# Patient Record
Sex: FEMALE | Race: WHITE
[De-identification: names, ages, dates, MRNs, and addresses within clinical notes are randomized per-mention and may not be internally consistent; named-entity substitution may affect disease eponyms.]

---

## 2021-08-24 ENCOUNTER — RX ONLY (RX ONLY)
Age: 81
End: 2021-08-24

## 2021-08-31 ENCOUNTER — DOCTOR'S OFFICE (OUTPATIENT)
Dept: URBAN - METROPOLITAN AREA CLINIC 163 | Facility: CLINIC | Age: 81
Setting detail: OPHTHALMOLOGY
End: 2021-08-31
Payer: MEDICARE

## 2021-08-31 PROCEDURE — 92250 FUNDUS PHOTOGRAPHY W/I&R: CPT | Performed by: OPTOMETRIST

## 2021-08-31 PROCEDURE — 92014 COMPRE OPH EXAM EST PT 1/>: CPT | Performed by: OPTOMETRIST

## 2021-08-31 ASSESSMENT — SPHEQUIV_DERIVED
OS_SPHEQUIV: -0.25
OD_SPHEQUIV: -0.125

## 2021-08-31 ASSESSMENT — REFRACTION_AUTOREFRACTION
OS_AXIS: 027
OS_CYLINDER: +0.50
OD_AXIS: 041
OS_SPHERE: -0.50
OD_SPHERE: -0.50
OD_CYLINDER: +0.75

## 2021-08-31 ASSESSMENT — CONFRONTATIONAL VISUAL FIELD TEST (CVF)
OD_FINDINGS: FULL
OS_FINDINGS: FULL

## 2021-08-31 ASSESSMENT — VISUAL ACUITY
OS_BCVA: 20/40-2
OD_BCVA: 20/30-1

## 2021-08-31 ASSESSMENT — SUPERFICIAL PUNCTATE KERATITIS (SPK)
OS_SPK: 1+
OD_SPK: 1+

## 2021-08-31 ASSESSMENT — TONOMETRY
OD_IOP_MMHG: 15
OS_IOP_MMHG: 20

## 2022-03-10 ENCOUNTER — DOCTOR'S OFFICE (OUTPATIENT)
Dept: URBAN - METROPOLITAN AREA CLINIC 163 | Facility: CLINIC | Age: 82
Setting detail: OPHTHALMOLOGY
End: 2022-03-10
Payer: COMMERCIAL

## 2022-03-10 ENCOUNTER — RX ONLY (RX ONLY)
Age: 82
End: 2022-03-10

## 2022-03-10 PROCEDURE — 92250 FUNDUS PHOTOGRAPHY W/I&R: CPT | Performed by: OPHTHALMOLOGY

## 2022-03-10 PROCEDURE — 92014 COMPRE OPH EXAM EST PT 1/>: CPT | Performed by: OPHTHALMOLOGY

## 2022-03-10 ASSESSMENT — REFRACTION_AUTOREFRACTION
OD_CYLINDER: +0.75
OD_SPHERE: -0.50
OS_AXIS: 027
OS_CYLINDER: +0.50
OD_AXIS: 041
OS_SPHERE: -0.50

## 2022-03-10 ASSESSMENT — SUPERFICIAL PUNCTATE KERATITIS (SPK)
OD_SPK: 1+
OS_SPK: 1+

## 2022-03-10 ASSESSMENT — SPHEQUIV_DERIVED
OS_SPHEQUIV: -0.25
OD_SPHEQUIV: -0.125

## 2022-03-10 ASSESSMENT — CONFRONTATIONAL VISUAL FIELD TEST (CVF)
OD_FINDINGS: FULL
OS_FINDINGS: FULL

## 2022-03-10 ASSESSMENT — VISUAL ACUITY
OD_BCVA: 20/30-2
OS_BCVA: 20/50

## 2022-09-22 ENCOUNTER — DOCTOR'S OFFICE (OUTPATIENT)
Dept: URBAN - METROPOLITAN AREA CLINIC 163 | Facility: CLINIC | Age: 82
Setting detail: OPHTHALMOLOGY
End: 2022-09-22
Payer: COMMERCIAL

## 2022-09-22 PROCEDURE — 92012 INTRM OPH EXAM EST PATIENT: CPT | Performed by: OPHTHALMOLOGY

## 2022-09-22 PROCEDURE — 92134 CPTRZ OPH DX IMG PST SGM RTA: CPT | Performed by: OPHTHALMOLOGY

## 2022-09-22 ASSESSMENT — REFRACTION_AUTOREFRACTION
OD_CYLINDER: +0.75
OS_AXIS: 027
OS_SPHERE: -0.50
OD_AXIS: 041
OD_SPHERE: -0.50
OS_CYLINDER: +0.50

## 2022-09-22 ASSESSMENT — SPHEQUIV_DERIVED
OD_SPHEQUIV: -0.125
OS_SPHEQUIV: -0.25

## 2022-09-22 ASSESSMENT — TONOMETRY
OS_IOP_MMHG: 16
OD_IOP_MMHG: 12

## 2022-09-22 ASSESSMENT — SUPERFICIAL PUNCTATE KERATITIS (SPK)
OD_SPK: 1+
OS_SPK: 1+

## 2022-09-22 ASSESSMENT — VISUAL ACUITY
OS_BCVA: 20/40
OD_BCVA: 20/40

## 2022-09-22 ASSESSMENT — CONFRONTATIONAL VISUAL FIELD TEST (CVF)
OD_FINDINGS: FULL
OS_FINDINGS: FULL

## 2022-11-09 ENCOUNTER — DOCTOR'S OFFICE (OUTPATIENT)
Dept: URBAN - METROPOLITAN AREA CLINIC 163 | Facility: CLINIC | Age: 82
Setting detail: OPHTHALMOLOGY
End: 2022-11-09
Payer: COMMERCIAL

## 2022-11-09 DIAGNOSIS — H35.3212: ICD-10-CM

## 2022-11-09 DIAGNOSIS — H35.3122: ICD-10-CM

## 2022-11-09 DIAGNOSIS — H04.123: ICD-10-CM

## 2022-11-09 DIAGNOSIS — H43.811: ICD-10-CM

## 2022-11-09 PROCEDURE — 92014 COMPRE OPH EXAM EST PT 1/>: CPT | Performed by: OPTOMETRIST

## 2022-11-09 PROCEDURE — 92201 OPSCPY EXTND RTA DRAW UNI/BI: CPT | Performed by: OPTOMETRIST

## 2022-11-09 ASSESSMENT — VISUAL ACUITY
OS_BCVA: 20/40-2
OD_BCVA: 20/40

## 2022-11-09 ASSESSMENT — SPHEQUIV_DERIVED
OD_SPHEQUIV: -0.125
OS_SPHEQUIV: -0.25

## 2022-11-09 ASSESSMENT — CONFRONTATIONAL VISUAL FIELD TEST (CVF)
OS_FINDINGS: FULL
OD_FINDINGS: FULL

## 2022-11-09 ASSESSMENT — REFRACTION_AUTOREFRACTION
OD_AXIS: 041
OS_SPHERE: -0.50
OD_CYLINDER: +0.75
OS_AXIS: 027
OS_CYLINDER: +0.50
OD_SPHERE: -0.50

## 2022-11-09 ASSESSMENT — SUPERFICIAL PUNCTATE KERATITIS (SPK)
OD_SPK: 1+
OS_SPK: 1+

## 2022-12-21 ENCOUNTER — DOCTOR'S OFFICE (OUTPATIENT)
Dept: URBAN - METROPOLITAN AREA CLINIC 163 | Facility: CLINIC | Age: 82
Setting detail: OPHTHALMOLOGY
End: 2022-12-21
Payer: COMMERCIAL

## 2022-12-21 DIAGNOSIS — H35.3212: ICD-10-CM

## 2022-12-21 DIAGNOSIS — H43.811: ICD-10-CM

## 2022-12-21 DIAGNOSIS — H35.3122: ICD-10-CM

## 2022-12-21 DIAGNOSIS — H04.123: ICD-10-CM

## 2022-12-21 PROCEDURE — 92012 INTRM OPH EXAM EST PATIENT: CPT | Performed by: OPTOMETRIST

## 2022-12-21 ASSESSMENT — REFRACTION_AUTOREFRACTION
OD_SPHERE: -0.50
OS_AXIS: 017
OD_CYLINDER: +1.00
OS_SPHERE: -0.50
OS_CYLINDER: +0.25
OD_AXIS: 035

## 2022-12-21 ASSESSMENT — SPHEQUIV_DERIVED
OS_SPHEQUIV: -0.375
OD_SPHEQUIV: 0

## 2022-12-21 ASSESSMENT — CONFRONTATIONAL VISUAL FIELD TEST (CVF)
OD_FINDINGS: FULL
OS_FINDINGS: FULL

## 2022-12-21 ASSESSMENT — SUPERFICIAL PUNCTATE KERATITIS (SPK)
OS_SPK: 1+
OD_SPK: 1+

## 2022-12-21 ASSESSMENT — VISUAL ACUITY
OD_BCVA: 20/40-2
OS_BCVA: 20/40-

## 2023-04-19 ENCOUNTER — DOCTOR'S OFFICE (OUTPATIENT)
Dept: URBAN - METROPOLITAN AREA CLINIC 163 | Facility: CLINIC | Age: 83
Setting detail: OPHTHALMOLOGY
End: 2023-04-19
Payer: COMMERCIAL

## 2023-04-19 DIAGNOSIS — H35.3212: ICD-10-CM

## 2023-04-19 DIAGNOSIS — H43.811: ICD-10-CM

## 2023-04-19 DIAGNOSIS — H04.123: ICD-10-CM

## 2023-04-19 DIAGNOSIS — H35.3122: ICD-10-CM

## 2023-04-19 PROCEDURE — 92014 COMPRE OPH EXAM EST PT 1/>: CPT | Performed by: OPTOMETRIST

## 2023-04-19 PROCEDURE — 92250 FUNDUS PHOTOGRAPHY W/I&R: CPT | Performed by: OPTOMETRIST

## 2023-04-19 ASSESSMENT — REFRACTION_CURRENTRX
OS_OVR_VA: 20/
OD_ADD: +2.50
OD_VPRISM_DIRECTION: SV
OS_VPRISM_DIRECTION: SV
OS_ADD: +2.50
OD_OVR_VA: 20/

## 2023-04-19 ASSESSMENT — CONFRONTATIONAL VISUAL FIELD TEST (CVF)
OD_FINDINGS: FULL
OS_FINDINGS: FULL

## 2023-04-19 ASSESSMENT — REFRACTION_AUTOREFRACTION
OS_AXIS: 017
OD_SPHERE: -0.50
OD_AXIS: 035
OS_CYLINDER: +0.25
OS_SPHERE: -0.50
OD_CYLINDER: +1.00

## 2023-04-19 ASSESSMENT — VISUAL ACUITY
OD_BCVA: 20/40-
OS_BCVA: 20/30-

## 2023-04-19 ASSESSMENT — SPHEQUIV_DERIVED
OS_SPHEQUIV: -0.375
OD_SPHEQUIV: 0

## 2023-04-19 ASSESSMENT — SUPERFICIAL PUNCTATE KERATITIS (SPK)
OD_SPK: 1+
OS_SPK: 1+

## 2023-05-15 ENCOUNTER — RX ONLY (RX ONLY)
Age: 83
End: 2023-05-15

## 2023-05-15 ENCOUNTER — DOCTOR'S OFFICE (OUTPATIENT)
Dept: URBAN - METROPOLITAN AREA CLINIC 163 | Facility: CLINIC | Age: 83
Setting detail: OPHTHALMOLOGY
End: 2023-05-15
Payer: COMMERCIAL

## 2023-05-15 DIAGNOSIS — H35.3212: ICD-10-CM

## 2023-05-15 DIAGNOSIS — H43.811: ICD-10-CM

## 2023-05-15 DIAGNOSIS — H00.12: ICD-10-CM

## 2023-05-15 DIAGNOSIS — H04.123: ICD-10-CM

## 2023-05-15 DIAGNOSIS — H35.3122: ICD-10-CM

## 2023-05-15 PROCEDURE — 11900 INJECT SKIN LESIONS </W 7: CPT | Performed by: OPHTHALMOLOGY

## 2023-05-15 PROCEDURE — 92012 INTRM OPH EXAM EST PATIENT: CPT | Performed by: OPHTHALMOLOGY

## 2023-05-15 ASSESSMENT — CONFRONTATIONAL VISUAL FIELD TEST (CVF)
OS_FINDINGS: FULL
OD_FINDINGS: FULL

## 2023-05-15 ASSESSMENT — REFRACTION_CURRENTRX
OS_ADD: +2.50
OS_VPRISM_DIRECTION: SV
OD_ADD: +2.50
OD_VPRISM_DIRECTION: SV
OS_OVR_VA: 20/
OD_OVR_VA: 20/

## 2023-05-15 ASSESSMENT — SUPERFICIAL PUNCTATE KERATITIS (SPK)
OD_SPK: 1+
OS_SPK: 1+

## 2023-05-15 ASSESSMENT — REFRACTION_AUTOREFRACTION
OD_SPHERE: -0.50
OD_AXIS: 035
OS_AXIS: 017
OD_CYLINDER: +1.00
OS_SPHERE: -0.50
OS_CYLINDER: +0.25

## 2023-05-15 ASSESSMENT — SPHEQUIV_DERIVED
OD_SPHEQUIV: 0
OS_SPHEQUIV: -0.375

## 2023-05-15 ASSESSMENT — VISUAL ACUITY
OD_BCVA: 20/30-2
OS_BCVA: 20/30-2

## 2023-05-24 ENCOUNTER — RX ONLY (RX ONLY)
Age: 83
End: 2023-05-24

## 2023-05-24 ENCOUNTER — DOCTOR'S OFFICE (OUTPATIENT)
Dept: URBAN - METROPOLITAN AREA CLINIC 163 | Facility: CLINIC | Age: 83
Setting detail: OPHTHALMOLOGY
End: 2023-05-24
Payer: COMMERCIAL

## 2023-05-24 DIAGNOSIS — H00.12: ICD-10-CM

## 2023-05-24 DIAGNOSIS — H35.3122: ICD-10-CM

## 2023-05-24 DIAGNOSIS — H43.811: ICD-10-CM

## 2023-05-24 DIAGNOSIS — H04.123: ICD-10-CM

## 2023-05-24 DIAGNOSIS — H01.004: ICD-10-CM

## 2023-05-24 DIAGNOSIS — H01.001: ICD-10-CM

## 2023-05-24 DIAGNOSIS — H35.3212: ICD-10-CM

## 2023-05-24 PROCEDURE — 92012 INTRM OPH EXAM EST PATIENT: CPT | Performed by: OPTOMETRIST

## 2023-05-24 ASSESSMENT — SUPERFICIAL PUNCTATE KERATITIS (SPK)
OD_SPK: 1+
OS_SPK: 1+

## 2023-05-24 ASSESSMENT — VISUAL ACUITY
OS_BCVA: 20/25-2
OD_BCVA: 20/25-

## 2023-05-24 ASSESSMENT — REFRACTION_CURRENTRX
OD_OVR_VA: 20/
OS_OVR_VA: 20/
OS_VPRISM_DIRECTION: SV
OD_VPRISM_DIRECTION: SV
OD_ADD: +2.50
OS_ADD: +2.50

## 2023-05-24 ASSESSMENT — REFRACTION_AUTOREFRACTION
OS_CYLINDER: +0.25
OD_SPHERE: -0.50
OS_SPHERE: -0.50
OD_AXIS: 035
OS_AXIS: 017
OD_CYLINDER: +1.00

## 2023-05-24 ASSESSMENT — CONFRONTATIONAL VISUAL FIELD TEST (CVF)
OS_FINDINGS: FULL
OD_FINDINGS: FULL

## 2023-05-24 ASSESSMENT — LID EXAM ASSESSMENTS
OS_BLEPHARITIS: LUL T
OD_BLEPHARITIS: RUL T

## 2023-05-24 ASSESSMENT — SPHEQUIV_DERIVED
OS_SPHEQUIV: -0.375
OD_SPHEQUIV: 0

## 2023-12-06 ENCOUNTER — INPATIENT (INPATIENT)
Facility: HOSPITAL | Age: 83
LOS: 0 days | Discharge: HOME | DRG: 176 | End: 2023-12-07
Attending: INTERNAL MEDICINE | Admitting: INTERNAL MEDICINE
Payer: MEDICARE

## 2023-12-06 VITALS
HEART RATE: 72 BPM | RESPIRATION RATE: 18 BRPM | HEIGHT: 62 IN | OXYGEN SATURATION: 95 % | TEMPERATURE: 97 F | WEIGHT: 149.91 LBS | SYSTOLIC BLOOD PRESSURE: 155 MMHG | DIASTOLIC BLOOD PRESSURE: 80 MMHG

## 2023-12-06 DIAGNOSIS — R07.9 CHEST PAIN, UNSPECIFIED: ICD-10-CM

## 2023-12-06 LAB
ALBUMIN SERPL ELPH-MCNC: 3.8 G/DL — SIGNIFICANT CHANGE UP (ref 3.3–5)
ALBUMIN SERPL ELPH-MCNC: 3.8 G/DL — SIGNIFICANT CHANGE UP (ref 3.3–5)
ALP SERPL-CCNC: 73 U/L — SIGNIFICANT CHANGE UP (ref 40–120)
ALP SERPL-CCNC: 73 U/L — SIGNIFICANT CHANGE UP (ref 40–120)
ALT FLD-CCNC: 39 U/L — SIGNIFICANT CHANGE UP (ref 10–45)
ALT FLD-CCNC: 39 U/L — SIGNIFICANT CHANGE UP (ref 10–45)
ANION GAP SERPL CALC-SCNC: 10 MMOL/L — SIGNIFICANT CHANGE UP (ref 5–17)
ANION GAP SERPL CALC-SCNC: 10 MMOL/L — SIGNIFICANT CHANGE UP (ref 5–17)
AST SERPL-CCNC: 32 U/L — SIGNIFICANT CHANGE UP (ref 10–40)
AST SERPL-CCNC: 32 U/L — SIGNIFICANT CHANGE UP (ref 10–40)
BASOPHILS # BLD AUTO: 0.07 K/UL — SIGNIFICANT CHANGE UP (ref 0–0.2)
BASOPHILS # BLD AUTO: 0.07 K/UL — SIGNIFICANT CHANGE UP (ref 0–0.2)
BASOPHILS NFR BLD AUTO: 1.1 % — SIGNIFICANT CHANGE UP (ref 0–2)
BASOPHILS NFR BLD AUTO: 1.1 % — SIGNIFICANT CHANGE UP (ref 0–2)
BILIRUB SERPL-MCNC: 0.4 MG/DL — SIGNIFICANT CHANGE UP (ref 0.2–1.2)
BILIRUB SERPL-MCNC: 0.4 MG/DL — SIGNIFICANT CHANGE UP (ref 0.2–1.2)
BUN SERPL-MCNC: 10 MG/DL — SIGNIFICANT CHANGE UP (ref 7–23)
BUN SERPL-MCNC: 10 MG/DL — SIGNIFICANT CHANGE UP (ref 7–23)
CALCIUM SERPL-MCNC: 9 MG/DL — SIGNIFICANT CHANGE UP (ref 8.4–10.5)
CALCIUM SERPL-MCNC: 9 MG/DL — SIGNIFICANT CHANGE UP (ref 8.4–10.5)
CHLORIDE SERPL-SCNC: 106 MMOL/L — SIGNIFICANT CHANGE UP (ref 96–108)
CHLORIDE SERPL-SCNC: 106 MMOL/L — SIGNIFICANT CHANGE UP (ref 96–108)
CO2 SERPL-SCNC: 27 MMOL/L — SIGNIFICANT CHANGE UP (ref 22–31)
CO2 SERPL-SCNC: 27 MMOL/L — SIGNIFICANT CHANGE UP (ref 22–31)
CREAT SERPL-MCNC: 0.63 MG/DL — SIGNIFICANT CHANGE UP (ref 0.5–1.3)
CREAT SERPL-MCNC: 0.63 MG/DL — SIGNIFICANT CHANGE UP (ref 0.5–1.3)
D DIMER BLD IA.RAPID-MCNC: 737 NG/ML DDU — HIGH
D DIMER BLD IA.RAPID-MCNC: 737 NG/ML DDU — HIGH
EGFR: 88 ML/MIN/1.73M2 — SIGNIFICANT CHANGE UP
EGFR: 88 ML/MIN/1.73M2 — SIGNIFICANT CHANGE UP
EOSINOPHIL # BLD AUTO: 0.17 K/UL — SIGNIFICANT CHANGE UP (ref 0–0.5)
EOSINOPHIL # BLD AUTO: 0.17 K/UL — SIGNIFICANT CHANGE UP (ref 0–0.5)
EOSINOPHIL NFR BLD AUTO: 2.7 % — SIGNIFICANT CHANGE UP (ref 0–6)
EOSINOPHIL NFR BLD AUTO: 2.7 % — SIGNIFICANT CHANGE UP (ref 0–6)
GLUCOSE SERPL-MCNC: 116 MG/DL — HIGH (ref 70–99)
GLUCOSE SERPL-MCNC: 116 MG/DL — HIGH (ref 70–99)
HCT VFR BLD CALC: 37.5 % — SIGNIFICANT CHANGE UP (ref 34.5–45)
HCT VFR BLD CALC: 37.5 % — SIGNIFICANT CHANGE UP (ref 34.5–45)
HGB BLD-MCNC: 12.8 G/DL — SIGNIFICANT CHANGE UP (ref 11.5–15.5)
HGB BLD-MCNC: 12.8 G/DL — SIGNIFICANT CHANGE UP (ref 11.5–15.5)
IMM GRANULOCYTES NFR BLD AUTO: 0.3 % — SIGNIFICANT CHANGE UP (ref 0–0.9)
IMM GRANULOCYTES NFR BLD AUTO: 0.3 % — SIGNIFICANT CHANGE UP (ref 0–0.9)
LIDOCAIN IGE QN: 48 U/L — SIGNIFICANT CHANGE UP (ref 16–77)
LIDOCAIN IGE QN: 48 U/L — SIGNIFICANT CHANGE UP (ref 16–77)
LYMPHOCYTES # BLD AUTO: 1.61 K/UL — SIGNIFICANT CHANGE UP (ref 1–3.3)
LYMPHOCYTES # BLD AUTO: 1.61 K/UL — SIGNIFICANT CHANGE UP (ref 1–3.3)
LYMPHOCYTES # BLD AUTO: 25.4 % — SIGNIFICANT CHANGE UP (ref 13–44)
LYMPHOCYTES # BLD AUTO: 25.4 % — SIGNIFICANT CHANGE UP (ref 13–44)
MAGNESIUM SERPL-MCNC: 2.2 MG/DL — SIGNIFICANT CHANGE UP (ref 1.6–2.6)
MAGNESIUM SERPL-MCNC: 2.2 MG/DL — SIGNIFICANT CHANGE UP (ref 1.6–2.6)
MCHC RBC-ENTMCNC: 31.2 PG — SIGNIFICANT CHANGE UP (ref 27–34)
MCHC RBC-ENTMCNC: 31.2 PG — SIGNIFICANT CHANGE UP (ref 27–34)
MCHC RBC-ENTMCNC: 34.1 GM/DL — SIGNIFICANT CHANGE UP (ref 32–36)
MCHC RBC-ENTMCNC: 34.1 GM/DL — SIGNIFICANT CHANGE UP (ref 32–36)
MCV RBC AUTO: 91.5 FL — SIGNIFICANT CHANGE UP (ref 80–100)
MCV RBC AUTO: 91.5 FL — SIGNIFICANT CHANGE UP (ref 80–100)
MONOCYTES # BLD AUTO: 0.65 K/UL — SIGNIFICANT CHANGE UP (ref 0–0.9)
MONOCYTES # BLD AUTO: 0.65 K/UL — SIGNIFICANT CHANGE UP (ref 0–0.9)
MONOCYTES NFR BLD AUTO: 10.2 % — SIGNIFICANT CHANGE UP (ref 2–14)
MONOCYTES NFR BLD AUTO: 10.2 % — SIGNIFICANT CHANGE UP (ref 2–14)
NEUTROPHILS # BLD AUTO: 3.83 K/UL — SIGNIFICANT CHANGE UP (ref 1.8–7.4)
NEUTROPHILS # BLD AUTO: 3.83 K/UL — SIGNIFICANT CHANGE UP (ref 1.8–7.4)
NEUTROPHILS NFR BLD AUTO: 60.3 % — SIGNIFICANT CHANGE UP (ref 43–77)
NEUTROPHILS NFR BLD AUTO: 60.3 % — SIGNIFICANT CHANGE UP (ref 43–77)
NRBC # BLD: 0 /100 WBCS — SIGNIFICANT CHANGE UP (ref 0–0)
NRBC # BLD: 0 /100 WBCS — SIGNIFICANT CHANGE UP (ref 0–0)
NT-PROBNP SERPL-SCNC: 469 PG/ML — HIGH (ref 0–300)
NT-PROBNP SERPL-SCNC: 469 PG/ML — HIGH (ref 0–300)
PLATELET # BLD AUTO: 217 K/UL — SIGNIFICANT CHANGE UP (ref 150–400)
PLATELET # BLD AUTO: 217 K/UL — SIGNIFICANT CHANGE UP (ref 150–400)
POTASSIUM SERPL-MCNC: 3.9 MMOL/L — SIGNIFICANT CHANGE UP (ref 3.5–5.3)
POTASSIUM SERPL-MCNC: 3.9 MMOL/L — SIGNIFICANT CHANGE UP (ref 3.5–5.3)
POTASSIUM SERPL-SCNC: 3.9 MMOL/L — SIGNIFICANT CHANGE UP (ref 3.5–5.3)
POTASSIUM SERPL-SCNC: 3.9 MMOL/L — SIGNIFICANT CHANGE UP (ref 3.5–5.3)
PROT SERPL-MCNC: 7.2 G/DL — SIGNIFICANT CHANGE UP (ref 6–8.3)
PROT SERPL-MCNC: 7.2 G/DL — SIGNIFICANT CHANGE UP (ref 6–8.3)
RBC # BLD: 4.1 M/UL — SIGNIFICANT CHANGE UP (ref 3.8–5.2)
RBC # BLD: 4.1 M/UL — SIGNIFICANT CHANGE UP (ref 3.8–5.2)
RBC # FLD: 12.4 % — SIGNIFICANT CHANGE UP (ref 10.3–14.5)
RBC # FLD: 12.4 % — SIGNIFICANT CHANGE UP (ref 10.3–14.5)
SODIUM SERPL-SCNC: 143 MMOL/L — SIGNIFICANT CHANGE UP (ref 135–145)
SODIUM SERPL-SCNC: 143 MMOL/L — SIGNIFICANT CHANGE UP (ref 135–145)
TROPONIN I, HIGH SENSITIVITY RESULT: 5.8 NG/L — SIGNIFICANT CHANGE UP
TROPONIN I, HIGH SENSITIVITY RESULT: 5.8 NG/L — SIGNIFICANT CHANGE UP
TROPONIN I, HIGH SENSITIVITY RESULT: 6 NG/L — SIGNIFICANT CHANGE UP
TROPONIN I, HIGH SENSITIVITY RESULT: 6 NG/L — SIGNIFICANT CHANGE UP
WBC # BLD: 6.35 K/UL — SIGNIFICANT CHANGE UP (ref 3.8–10.5)
WBC # BLD: 6.35 K/UL — SIGNIFICANT CHANGE UP (ref 3.8–10.5)
WBC # FLD AUTO: 6.35 K/UL — SIGNIFICANT CHANGE UP (ref 3.8–10.5)
WBC # FLD AUTO: 6.35 K/UL — SIGNIFICANT CHANGE UP (ref 3.8–10.5)

## 2023-12-06 PROCEDURE — 93306 TTE W/DOPPLER COMPLETE: CPT | Mod: 26

## 2023-12-06 PROCEDURE — 71275 CT ANGIOGRAPHY CHEST: CPT | Mod: 26,MA

## 2023-12-06 PROCEDURE — 99285 EMERGENCY DEPT VISIT HI MDM: CPT

## 2023-12-06 PROCEDURE — 93970 EXTREMITY STUDY: CPT | Mod: 26

## 2023-12-06 PROCEDURE — 99222 1ST HOSP IP/OBS MODERATE 55: CPT

## 2023-12-06 PROCEDURE — 93010 ELECTROCARDIOGRAM REPORT: CPT

## 2023-12-06 PROCEDURE — 71045 X-RAY EXAM CHEST 1 VIEW: CPT | Mod: 26

## 2023-12-06 PROCEDURE — 99223 1ST HOSP IP/OBS HIGH 75: CPT

## 2023-12-06 RX ORDER — ENOXAPARIN SODIUM 100 MG/ML
70 INJECTION SUBCUTANEOUS EVERY 12 HOURS
Refills: 0 | Status: DISCONTINUED | OUTPATIENT
Start: 2023-12-06 | End: 2023-12-07

## 2023-12-06 RX ORDER — SERTRALINE 25 MG/1
1 TABLET, FILM COATED ORAL
Refills: 0 | DISCHARGE

## 2023-12-06 RX ORDER — ONDANSETRON 8 MG/1
4 TABLET, FILM COATED ORAL EVERY 8 HOURS
Refills: 0 | Status: DISCONTINUED | OUTPATIENT
Start: 2023-12-06 | End: 2023-12-07

## 2023-12-06 RX ORDER — SODIUM CHLORIDE 9 MG/ML
500 INJECTION INTRAMUSCULAR; INTRAVENOUS; SUBCUTANEOUS ONCE
Refills: 0 | Status: COMPLETED | OUTPATIENT
Start: 2023-12-06 | End: 2023-12-06

## 2023-12-06 RX ORDER — ENOXAPARIN SODIUM 100 MG/ML
70 INJECTION SUBCUTANEOUS ONCE
Refills: 0 | Status: COMPLETED | OUTPATIENT
Start: 2023-12-06 | End: 2023-12-06

## 2023-12-06 RX ORDER — ATORVASTATIN CALCIUM 80 MG/1
1 TABLET, FILM COATED ORAL
Refills: 0 | DISCHARGE

## 2023-12-06 RX ORDER — ATENOLOL 25 MG/1
100 TABLET ORAL DAILY
Refills: 0 | Status: DISCONTINUED | OUTPATIENT
Start: 2023-12-06 | End: 2023-12-07

## 2023-12-06 RX ORDER — ACETAMINOPHEN 500 MG
650 TABLET ORAL EVERY 6 HOURS
Refills: 0 | Status: DISCONTINUED | OUTPATIENT
Start: 2023-12-06 | End: 2023-12-07

## 2023-12-06 RX ORDER — FAMOTIDINE 10 MG/ML
1 INJECTION INTRAVENOUS
Refills: 0 | DISCHARGE

## 2023-12-06 RX ORDER — LANOLIN ALCOHOL/MO/W.PET/CERES
3 CREAM (GRAM) TOPICAL AT BEDTIME
Refills: 0 | Status: DISCONTINUED | OUTPATIENT
Start: 2023-12-06 | End: 2023-12-07

## 2023-12-06 RX ORDER — ATORVASTATIN CALCIUM 80 MG/1
20 TABLET, FILM COATED ORAL AT BEDTIME
Refills: 0 | Status: DISCONTINUED | OUTPATIENT
Start: 2023-12-06 | End: 2023-12-07

## 2023-12-06 RX ORDER — SERTRALINE 25 MG/1
150 TABLET, FILM COATED ORAL DAILY
Refills: 0 | Status: DISCONTINUED | OUTPATIENT
Start: 2023-12-06 | End: 2023-12-07

## 2023-12-06 RX ORDER — FAMOTIDINE 10 MG/ML
20 INJECTION INTRAVENOUS DAILY
Refills: 0 | Status: DISCONTINUED | OUTPATIENT
Start: 2023-12-06 | End: 2023-12-07

## 2023-12-06 RX ORDER — ATENOLOL 25 MG/1
1 TABLET ORAL
Refills: 0 | DISCHARGE

## 2023-12-06 RX ADMIN — Medication 650 MILLIGRAM(S): at 21:26

## 2023-12-06 RX ADMIN — ATORVASTATIN CALCIUM 20 MILLIGRAM(S): 80 TABLET, FILM COATED ORAL at 22:43

## 2023-12-06 RX ADMIN — SODIUM CHLORIDE 1000 MILLILITER(S): 9 INJECTION INTRAMUSCULAR; INTRAVENOUS; SUBCUTANEOUS at 08:12

## 2023-12-06 RX ADMIN — Medication 650 MILLIGRAM(S): at 20:56

## 2023-12-06 RX ADMIN — SODIUM CHLORIDE 500 MILLILITER(S): 9 INJECTION INTRAMUSCULAR; INTRAVENOUS; SUBCUTANEOUS at 10:00

## 2023-12-06 RX ADMIN — ENOXAPARIN SODIUM 70 MILLIGRAM(S): 100 INJECTION SUBCUTANEOUS at 12:46

## 2023-12-06 RX ADMIN — ATENOLOL 100 MILLIGRAM(S): 25 TABLET ORAL at 22:43

## 2023-12-06 RX ADMIN — SERTRALINE 150 MILLIGRAM(S): 25 TABLET, FILM COATED ORAL at 23:35

## 2023-12-06 NOTE — H&P ADULT - NSHPPHYSICALEXAM_GEN_ALL_CORE
T(C): 36.2 (12-06-23 @ 07:41), Max: 36.2 (12-06-23 @ 07:41)  HR: 75 (12-06-23 @ 08:05) (72 - 75)  BP: 141/90 (12-06-23 @ 08:05) (141/90 - 155/80)  RR: 17 (12-06-23 @ 08:05) (17 - 18)  SpO2: 97% (12-06-23 @ 08:05) (95% - 97%)  Wt(kg): --Vital Signs Last 24 Hrs  T(C): 36.2 (06 Dec 2023 07:41), Max: 36.2 (06 Dec 2023 07:41)  T(F): 97.2 (06 Dec 2023 07:41), Max: 97.2 (06 Dec 2023 07:41)  HR: 75 (06 Dec 2023 08:05) (72 - 75)  BP: 141/90 (06 Dec 2023 08:05) (141/90 - 155/80)  BP(mean): --  RR: 17 (06 Dec 2023 08:05) (17 - 18)  SpO2: 97% (06 Dec 2023 08:05) (95% - 97%)    Parameters below as of 06 Dec 2023 08:05  Patient On (Oxygen Delivery Method): room air        PHYSICAL EXAM:  GENERAL: NAD, well-groomed, well-developed  HEAD:  Atraumatic, Normocephalic  EYES: EOMI, PERRLA, conjunctiva and sclera clear  ENMT: No tonsillar erythema, exudates, or enlargement; Moist mucous membranes, Good dentition, No lesions  NECK: Supple, No JVD  NERVOUS SYSTEM:  Alert & Oriented X3, Good concentration; Moving extremities spontaneously   CHEST/LUNG: Clear to percussion bilaterally; No rales, rhonchi, wheezing, or rubs  HEART: Regular rate and rhythm; +murmur  ABDOMEN: Soft, Nontender, Nondistended; Bowel sounds present  EXTREMITIES:  No clubbing, cyanosis, or edema

## 2023-12-06 NOTE — ED ADULT NURSE NOTE - NSICDXPASTMEDICALHX_GEN_ALL_CORE_FT
PAST MEDICAL HISTORY:  GERD (gastroesophageal reflux disease)     HTN (hypertension)     Mitral valve prolapse

## 2023-12-06 NOTE — CONSULT NOTE ADULT - SUBJECTIVE AND OBJECTIVE BOX
Initial HPI on admission:  HPI:  89 year-old female past medical history mitral valve prolapse, hypertension, HLD, GERD presents to the ED that started two night ago. Pt first felt chest pain two nights ago, pain went away but felt it again this morning when she woke up. Pt reports left sided pressure like chest pain, wraps around lateral chest and pain is worse with movement, associated with SOB.  No leg pain, calf pain or swelling.  No headache, fever, chills, cough, abd pain, n/v/d.      In ED, afebrile, VS stable. sating 97% on RA  CTA chest showed segmental PE within right upper, middle and lower lobe. no right heart strain.  Interlobular septal thickening is identified.There is a region of nodular opacity identified within the right upper lobe measuring approximately 6 mm (series 303, image 167), indeterminate. A 1.0 cm region of nodular opacity is noted along the pleural surface of the right lower lobe posteriorly. (06 Dec 2023 12:57)      BRIEF HOSPITAL COURSE: Endorsing chest pain intermittently, appears pleuritic in nature. Denies any fevers, chills, cough, joint pains. Distant history of smoking. Denies any history of pulmonary diseases.     PAST MEDICAL & SURGICAL HISTORY:  HTN (hypertension)      GERD (gastroesophageal reflux disease)      Mitral valve prolapse        Allergies    Seafood (Unknown)  No Known Drug Allergies    Intolerances      FAMILY HISTORY:  FH: CAD (coronary artery disease)    Social History:  30-35 pack year smoking history. Pt did quit for 32 years.  occasional ETOH  no illicit drug (06 Dec 2023 12:57)      Review of Systems:  Negative except for above    Medications:  acetaminophen     Tablet .. 650 milliGRAM(s) Oral every 6 hours PRN Temp greater or equal to 38C (100.4F), Mild Pain (1 - 3)  aluminum hydroxide/magnesium hydroxide/simethicone Suspension 30 milliLiter(s) Oral every 4 hours PRN Dyspepsia  atenolol  Tablet 100 milliGRAM(s) Oral daily  atorvastatin 20 milliGRAM(s) Oral at bedtime  enoxaparin Injectable 70 milliGRAM(s) SubCutaneous every 12 hours  famotidine    Tablet 20 milliGRAM(s) Oral daily  melatonin 3 milliGRAM(s) Oral at bedtime PRN Insomnia  ondansetron Injectable 4 milliGRAM(s) IV Push every 8 hours PRN Nausea and/or Vomiting  sertraline 150 milliGRAM(s) Oral daily    MEDICATIONS  (STANDING):  atenolol  Tablet 100 milliGRAM(s) Oral daily  atorvastatin 20 milliGRAM(s) Oral at bedtime  enoxaparin Injectable 70 milliGRAM(s) SubCutaneous every 12 hours  famotidine    Tablet 20 milliGRAM(s) Oral daily  sertraline 150 milliGRAM(s) Oral daily    MEDICATIONS  (PRN):  acetaminophen     Tablet .. 650 milliGRAM(s) Oral every 6 hours PRN Temp greater or equal to 38C (100.4F), Mild Pain (1 - 3)  aluminum hydroxide/magnesium hydroxide/simethicone Suspension 30 milliLiter(s) Oral every 4 hours PRN Dyspepsia  melatonin 3 milliGRAM(s) Oral at bedtime PRN Insomnia  ondansetron Injectable 4 milliGRAM(s) IV Push every 8 hours PRN Nausea and/or Vomiting      Antibiotics History      Heme Medications   enoxaparin Injectable 70 milliGRAM(s) SubCutaneous every 12 hours, 12-06-23 @ 13:28      GI Medications  aluminum hydroxide/magnesium hydroxide/simethicone Suspension 30 milliLiter(s) Oral every 4 hours, 12-06-23 @ 13:27, Routine PRN  famotidine    Tablet 20 milliGRAM(s) Oral daily, 12-06-23 @ 13:25, Routine        Home Medications:  Last Order Reconciliation Date: 12-06-23 @ 13:28 (Admission Reconciliation)  atenolol 100 mg oral tablet: 1 tab(s) orally once a day (12-06-23 @ 13:23)  famotidine 20 mg oral tablet: 1 tab(s) orally once a day (12-06-23 @ 13:24)  Lipitor 20 mg oral tablet: 1 tab(s) orally once a day (at bedtime) (12-06-23 @ 13:24)  sertraline 150 mg oral capsule: 1 cap(s) orally once a day (12-06-23 @ 13:23)      LABS:                        12.8   6.35  )-----------( 217      ( 06 Dec 2023 08:05 )             37.5     12-06    143  |  106  |  10  ----------------------------<  116<H>  3.9   |  27  |  0.63    Ca    9.0      06 Dec 2023 08:05  Mg     2.2     12-06    TPro  7.2  /  Alb  3.8  /  TBili  0.4  /  DBili  x   /  AST  32  /  ALT  39  /  AlkPhos  73  12-06    HIT ab -- 12-06 @ 08:05  HIT ab EIA --  D Dimer -737            Urinalysis Basic - ( 06 Dec 2023 08:05 )    Color: x / Appearance: x / SG: x / pH: x  Gluc: 116 mg/dL / Ketone: x  / Bili: x / Urobili: x   Blood: x / Protein: x / Nitrite: x   Leuk Esterase: x / RBC: x / WBC x   Sq Epi: x / Non Sq Epi: x / Bacteria: x      VITALS:  T(C): 36.2 (12-06-23 @ 07:41), Max: 36.2 (12-06-23 @ 07:41)  T(F): 97.2 (12-06-23 @ 07:41), Max: 97.2 (12-06-23 @ 07:41)  HR: 64 (12-06-23 @ 17:01) (64 - 75)  BP: 132/67 (12-06-23 @ 17:01) (132/67 - 155/80)  BP(mean): --  ABP: --  ABP(mean): --  RR: 15 (12-06-23 @ 17:01) (15 - 18)  SpO2: 95% (12-06-23 @ 17:01) (95% - 97%)  CVP(mm Hg): --  CVP(cm H2O): --    Ins and Outs       Height (cm): 157.5 (12-06-23 @ 07:41)  Weight (kg): 68 (12-06-23 @ 07:41)  BMI (kg/m2): 27.4 (12-06-23 @ 07:41)        I&O's Detail      Physical Examination:  GENERAL:               Alert, Oriented, No acute distress.    HEENT:                    Pupils equal, reactive to light.  Symmetric. No JVD, Moist MM  PULM:                     Bilateral air entry, Clear to auscultation bilaterally, No Rales, No Rhonchi, No Wheezing  CVS:                         S1, S2,  No Murmur  ABD:                        Soft, nondistended, nontender, normoactive bowel sounds,   EXT:                         No edema, nontender, No Cyanosis or Clubbing   Vascular:                Warm Extremities, Normal Capillary refill, Normal Distal Pulses  SKIN:                       Warm and well perfused, no rashes noted.   NEURO:                  Alert, oriented, interactive, nonfocal, follows commands  PSYC:                      Calm, + Insight.

## 2023-12-06 NOTE — ED PROVIDER NOTE - PHYSICAL EXAMINATION
General:     NAD, well-nourished, well-appearing  Eyes: PERRL, white sclera  Head:     NC/AT, EOMI  Pharynx: pharynx wnl, oral mucosa moist  Neck:     trachea midline  Lungs:     CTA b/l  CVS:     RRR  Abd:     +BS, s/nt/nd  Ext: no calf tenderness or leg swelling,  no deformities, reproducible chest wall tenderness to the ~4 or 5th rib mid axillary line. No palpable breast mass. Pain also reproduced with raising the left arm.  Skin: no rash, spider and varicose veins b/l.   Neuro: AAOx3, no sensory/motor deficits

## 2023-12-06 NOTE — H&P ADULT - HISTORY OF PRESENT ILLNESS
89 year-old female past medical history mitral valve prolapse, hypertension, HLD, GERD presents to the ED that started two night ago. Pt first felt chest pain two nights ago, pain went away but felt it again this morning when she woke up. Pt reports left sided pressure like chest pain, wraps around lateral chest and pain is worse with movement, associated with SOB.  No leg pain, calf pain or swelling.  No headache, fever, chills, cough, abd pain, n/v/d.      In ED, afebrile, VS stable. sating   CTA chest showed segmental PE within right upper, middle and lower lobe. no right heart strain.  Interlobular septal thickening is identified.There is a region of nodular opacity identified within the right upper lobe measuring approximately 6 mm (series 303, image 167), indeterminate. A 1.0 cm region of nodular opacity is noted along the pleural surface of the right lower lobe posteriorly. 89 year-old female past medical history mitral valve prolapse, hypertension, HLD, GERD presents to the ED that started two night ago. Pt first felt chest pain two nights ago, pain went away but felt it again this morning when she woke up. Pt reports left sided pressure like chest pain, wraps around lateral chest and pain is worse with movement, associated with SOB.  No leg pain, calf pain or swelling.  No headache, fever, chills, cough, abd pain, n/v/d.      In ED, afebrile, VS stable. sating 97% on RA  CTA chest showed segmental PE within right upper, middle and lower lobe. no right heart strain.  Interlobular septal thickening is identified.There is a region of nodular opacity identified within the right upper lobe measuring approximately 6 mm (series 303, image 167), indeterminate. A 1.0 cm region of nodular opacity is noted along the pleural surface of the right lower lobe posteriorly.

## 2023-12-06 NOTE — ED ADULT NURSE NOTE - NSFALLUNIVINTERV_ED_ALL_ED
Bed/Stretcher in lowest position, wheels locked, appropriate side rails in place/Call bell, personal items and telephone in reach/Instruct patient to call for assistance before getting out of bed/chair/stretcher/Non-slip footwear applied when patient is off stretcher/[place] to call system/Physically safe environment - no spills, clutter or unnecessary equipment/Purposeful proactive rounding/Room/bathroom lighting operational, light cord in reach Bed/Stretcher in lowest position, wheels locked, appropriate side rails in place/Call bell, personal items and telephone in reach/Instruct patient to call for assistance before getting out of bed/chair/stretcher/Non-slip footwear applied when patient is off stretcher/[place] to call system/Physically safe environment - no spills, clutter or unnecessary equipment/Purposeful proactive rounding/Room/bathroom lighting operational, light cord in reach

## 2023-12-06 NOTE — CONSULT NOTE ADULT - SUBJECTIVE AND OBJECTIVE BOX
JOSÉ MIGUEL KAPLAN  [id number]      HPI: 89 year-old female past medical history mitral valve prolapse, hypertension, HLD, GERD presents to the ED with chest pain that started two night ago. Pt first felt chest pain two nights ago, pain went away but felt it again this morning when she woke up. Pt reports left sided pressure like chest pain, wraps around lateral chest and pain is worse with movement, associated with SOB.  No leg pain, calf pain or swelling.  No headache, fever, chills, cough, abd pain, n/v/d.      In ED, afebrile, VS stable. sating 97% on RA  CTA chest showed segmental PE within right upper, middle and lower lobe. no right heart strain.  Interlobular septal thickening is identified.There is a region of nodular opacity identified within the right upper lobe measuring approximately 6 mm (series 303, image 167), indeterminate. A 1.0 cm region of nodular opacity is noted along the pleural surface of the right lower lobe posteriorly.       ALLERGIES:  Seafood (Unknown)  No Known Drug Allergies      PAST MEDICAL & SURGICAL HISTORY:  HTN (hypertension)      GERD (gastroesophageal reflux disease)      Mitral valve prolapse            CURRENT MEDICATIONS:  MEDICATIONS  (STANDING):  atenolol  Tablet 100 milliGRAM(s) Oral daily  atorvastatin 20 milliGRAM(s) Oral at bedtime  enoxaparin Injectable 70 milliGRAM(s) SubCutaneous every 12 hours  famotidine    Tablet 20 milliGRAM(s) Oral daily  sertraline 150 milliGRAM(s) Oral daily    MEDICATIONS  (PRN):  acetaminophen     Tablet .. 650 milliGRAM(s) Oral every 6 hours PRN Temp greater or equal to 38C (100.4F), Mild Pain (1 - 3)  aluminum hydroxide/magnesium hydroxide/simethicone Suspension 30 milliLiter(s) Oral every 4 hours PRN Dyspepsia  melatonin 3 milliGRAM(s) Oral at bedtime PRN Insomnia  ondansetron Injectable 4 milliGRAM(s) IV Push every 8 hours PRN Nausea and/or Vomiting      SOCIAL HISTORY:      FAMILY HISTORY:  FH: CAD (coronary artery disease)        ROS:  All 10 systems reviewed and positives noted in HPI    OBJECTIVE:    VITAL SIGNS:  Vital Signs Last 24 Hrs  T(C): 36.2 (06 Dec 2023 07:41), Max: 36.2 (06 Dec 2023 07:41)  T(F): 97.2 (06 Dec 2023 07:41), Max: 97.2 (06 Dec 2023 07:41)  HR: 75 (06 Dec 2023 08:05) (72 - 75)  BP: 141/90 (06 Dec 2023 08:05) (141/90 - 155/80)  BP(mean): --  RR: 17 (06 Dec 2023 08:05) (17 - 18)  SpO2: 97% (06 Dec 2023 08:05) (95% - 97%)    Parameters below as of 06 Dec 2023 08:05  Patient On (Oxygen Delivery Method): room air        PHYSICAL EXAM:  General: well appearing, no distress  HEENT: sclera anicteric  Neck: supple, no carotid bruits b/l  CVS: JVP ~ 7 cm H20, RRR, s1, s2, no murmurs/rubs/gallops  Chest: unlabored respirations, clear to auscultation b/l  Abdomen: non-distended  Extremities: no lower extremity edema b/l  Neuro: awake, alert & oriented x 3  Psych: normal affect      LABS:                        12.8   6.35  )-----------( 217      ( 06 Dec 2023 08:05 )             37.5     12-06    143  |  106  |  10  ----------------------------<  116<H>  3.9   |  27  |  0.63    Ca    9.0      06 Dec 2023 08:05  Mg     2.2     12-06    TPro  7.2  /  Alb  3.8  /  TBili  0.4  /  DBili  x   /  AST  32  /  ALT  39  /  AlkPhos  73  12-06        ECG:      TTE:      JOSÉ MIGUEL KAPLAN  [id number]      HPI: 89 year-old female past medical history mitral valve prolapse, hypertension, HLD, GERD presents to the ED with chest pain that started two night ago. Pt first felt chest pain two nights ago, pain went away but felt it again this morning when she woke up. Pt reports left sided pressure like chest pain, wraps around lateral chest and pain is worse with movement, associated with SOB.  No leg pain, calf pain or swelling.  No headache, fever, chills, cough, abd pain, n/v/d.      In ED, afebrile, VS stable. sating 97% on RA  CTA chest showed segmental PE within right upper, middle and lower lobe. no right heart strain.  Interlobular septal thickening is identified.There is a region of nodular opacity identified within the right upper lobe measuring approximately 6 mm (series 303, image 167), indeterminate. A 1.0 cm region of nodular opacity is noted along the pleural surface of the right lower lobe posteriorly.       ALLERGIES:  Seafood (Unknown)  No Known Drug Allergies      PAST MEDICAL & SURGICAL HISTORY:  HTN (hypertension)      GERD (gastroesophageal reflux disease)      Mitral valve prolapse            CURRENT MEDICATIONS:  MEDICATIONS  (STANDING):  atenolol  Tablet 100 milliGRAM(s) Oral daily  atorvastatin 20 milliGRAM(s) Oral at bedtime  enoxaparin Injectable 70 milliGRAM(s) SubCutaneous every 12 hours  famotidine    Tablet 20 milliGRAM(s) Oral daily  sertraline 150 milliGRAM(s) Oral daily    MEDICATIONS  (PRN):  acetaminophen     Tablet .. 650 milliGRAM(s) Oral every 6 hours PRN Temp greater or equal to 38C (100.4F), Mild Pain (1 - 3)  aluminum hydroxide/magnesium hydroxide/simethicone Suspension 30 milliLiter(s) Oral every 4 hours PRN Dyspepsia  melatonin 3 milliGRAM(s) Oral at bedtime PRN Insomnia  ondansetron Injectable 4 milliGRAM(s) IV Push every 8 hours PRN Nausea and/or Vomiting      SOCIAL HISTORY:      FAMILY HISTORY:  FH: CAD (coronary artery disease)        ROS:  All 10 systems reviewed and positives noted in HPI    OBJECTIVE:    VITAL SIGNS:  Vital Signs Last 24 Hrs  T(C): 36.2 (06 Dec 2023 07:41), Max: 36.2 (06 Dec 2023 07:41)  T(F): 97.2 (06 Dec 2023 07:41), Max: 97.2 (06 Dec 2023 07:41)  HR: 75 (06 Dec 2023 08:05) (72 - 75)  BP: 141/90 (06 Dec 2023 08:05) (141/90 - 155/80)  BP(mean): --  RR: 17 (06 Dec 2023 08:05) (17 - 18)  SpO2: 97% (06 Dec 2023 08:05) (95% - 97%)    Parameters below as of 06 Dec 2023 08:05  Patient On (Oxygen Delivery Method): room air        PHYSICAL EXAM:  General: well appearing, no distress  HEENT: sclera anicteric  Neck: supple, no carotid bruits b/l  CVS: JVP ~ 7 cm H20, RRR, s1, s2, no murmurs/rubs/gallops  Chest: unlabored respirations, clear to auscultation b/l  Abdomen: non-distended  Extremities: no lower extremity edema b/l  Neuro: awake, alert & oriented x 3  Psych: normal affect      LABS:                        12.8   6.35  )-----------( 217      ( 06 Dec 2023 08:05 )             37.5     12-06    143  |  106  |  10  ----------------------------<  116<H>  3.9   |  27  |  0.63    Ca    9.0      06 Dec 2023 08:05  Mg     2.2     12-06    TPro  7.2  /  Alb  3.8  /  TBili  0.4  /  DBili  x   /  AST  32  /  ALT  39  /  AlkPhos  73  12-06        ECG:      TTE:      JOSÉ MIGUEL KAPLAN  [id number]      HPI: 89 year-old female past medical history mitral valve prolapse, hypertension, HLD, GERD presents to the ED with chest pain that started two night ago. Pt first felt chest pain two nights ago, pain went away but felt it again this morning when she woke up. Pt reports left sided pressure like chest pain, wraps around lateral chest and pain is worse with movement, associated with SOB.  No leg pain, calf pain or swelling.  No headache, fever, chills, cough, abd pain, n/v/d.      In ED, afebrile, VS stable. sating 97% on RA  CTA chest showed segmental PE within right upper, middle and lower lobe. no right heart strain.  Interlobular septal thickening is identified.There is a region of nodular opacity identified within the right upper lobe measuring approximately 6 mm (series 303, image 167), indeterminate. A 1.0 cm region of nodular opacity is noted along the pleural surface of the right lower lobe posteriorly.       ALLERGIES:  Seafood (Unknown)  No Known Drug Allergies      PAST MEDICAL & SURGICAL HISTORY:  HTN (hypertension)      GERD (gastroesophageal reflux disease)      Mitral valve prolapse            CURRENT MEDICATIONS:  MEDICATIONS  (STANDING):  atenolol  Tablet 100 milliGRAM(s) Oral daily  atorvastatin 20 milliGRAM(s) Oral at bedtime  enoxaparin Injectable 70 milliGRAM(s) SubCutaneous every 12 hours  famotidine    Tablet 20 milliGRAM(s) Oral daily  sertraline 150 milliGRAM(s) Oral daily    MEDICATIONS  (PRN):  acetaminophen     Tablet .. 650 milliGRAM(s) Oral every 6 hours PRN Temp greater or equal to 38C (100.4F), Mild Pain (1 - 3)  aluminum hydroxide/magnesium hydroxide/simethicone Suspension 30 milliLiter(s) Oral every 4 hours PRN Dyspepsia  melatonin 3 milliGRAM(s) Oral at bedtime PRN Insomnia  ondansetron Injectable 4 milliGRAM(s) IV Push every 8 hours PRN Nausea and/or Vomiting      SOCIAL HISTORY:  former smoker 30 yrs ago 1PPD    FAMILY HISTORY:  FH: CAD (coronary artery disease)  HTN      ROS:  All 10 systems reviewed and positives noted in HPI    OBJECTIVE:    VITAL SIGNS:  Vital Signs Last 24 Hrs  T(C): 36.2 (06 Dec 2023 07:41), Max: 36.2 (06 Dec 2023 07:41)  T(F): 97.2 (06 Dec 2023 07:41), Max: 97.2 (06 Dec 2023 07:41)  HR: 75 (06 Dec 2023 08:05) (72 - 75)  BP: 141/90 (06 Dec 2023 08:05) (141/90 - 155/80)  BP(mean): --  RR: 17 (06 Dec 2023 08:05) (17 - 18)  SpO2: 97% (06 Dec 2023 08:05) (95% - 97%)    Parameters below as of 06 Dec 2023 08:05  Patient On (Oxygen Delivery Method): room air        PHYSICAL EXAM:  General: well appearing, no distress  HEENT: sclera anicteric  Neck: supple, no carotid bruits b/l  CVS: JVP ~ 7 cm H20, RRR, s1, s2, no murmurs/rubs/gallops  Chest: unlabored respirations, clear to auscultation b/l  Abdomen: non-distended  Extremities: no lower extremity edema b/l  Neuro: awake, alert & oriented x 3  Psych: normal affect      LABS:                        12.8   6.35  )-----------( 217      ( 06 Dec 2023 08:05 )             37.5     12-    143  |  106  |  10  ----------------------------<  116<H>  3.9   |  27  |  0.63    Ca    9.0      06 Dec 2023 08:05  Mg     2.2     12-    TPro  7.2  /  Alb  3.8  /  TBili  0.4  /  DBili  x   /  AST  32  /  ALT  39  /  AlkPhos  73  12-        EC23 NSR      TTE:      JOSÉ MIGUEL KAPLAN  [id number]      HPI: 89 year-old female past medical history mitral valve prolapse, hypertension, HLD, GERD presents to the ED with chest pain that started two night ago. Pt first felt chest pain two nights ago, pain went away but felt it again this morning when she woke up. Pt reports left sided pressure like chest pain, wraps around lateral chest and pain is worse with movement, associated with SOB.  No leg pain, calf pain or swelling.  No headache, fever, chills, cough, abd pain, n/v/d.      In ED, afebrile, VS stable. sating 97% on RA  CTA chest showed segmental PE within right upper, middle and lower lobe. no right heart strain.  Interlobular septal thickening is identified.There is a region of nodular opacity identified within the right upper lobe measuring approximately 6 mm (series 303, image 167), indeterminate. A 1.0 cm region of nodular opacity is noted along the pleural surface of the right lower lobe posteriorly.       ALLERGIES:  Seafood (Unknown)  No Known Drug Allergies      PAST MEDICAL & SURGICAL HISTORY:  HTN (hypertension)      GERD (gastroesophageal reflux disease)      Mitral valve prolapse            CURRENT MEDICATIONS:  MEDICATIONS  (STANDING):  atenolol  Tablet 100 milliGRAM(s) Oral daily  atorvastatin 20 milliGRAM(s) Oral at bedtime  enoxaparin Injectable 70 milliGRAM(s) SubCutaneous every 12 hours  famotidine    Tablet 20 milliGRAM(s) Oral daily  sertraline 150 milliGRAM(s) Oral daily    MEDICATIONS  (PRN):  acetaminophen     Tablet .. 650 milliGRAM(s) Oral every 6 hours PRN Temp greater or equal to 38C (100.4F), Mild Pain (1 - 3)  aluminum hydroxide/magnesium hydroxide/simethicone Suspension 30 milliLiter(s) Oral every 4 hours PRN Dyspepsia  melatonin 3 milliGRAM(s) Oral at bedtime PRN Insomnia  ondansetron Injectable 4 milliGRAM(s) IV Push every 8 hours PRN Nausea and/or Vomiting      SOCIAL HISTORY:  former smoker 30 yrs ago 1PPD    FAMILY HISTORY:  FH: CAD (coronary artery disease)  HTN      ROS:  All 10 systems reviewed and positives noted in HPI    OBJECTIVE:    VITAL SIGNS:  Vital Signs Last 24 Hrs  T(C): 36.2 (06 Dec 2023 07:41), Max: 36.2 (06 Dec 2023 07:41)  T(F): 97.2 (06 Dec 2023 07:41), Max: 97.2 (06 Dec 2023 07:41)  HR: 75 (06 Dec 2023 08:05) (72 - 75)  BP: 141/90 (06 Dec 2023 08:05) (141/90 - 155/80)  BP(mean): --  RR: 17 (06 Dec 2023 08:05) (17 - 18)  SpO2: 97% (06 Dec 2023 08:05) (95% - 97%)    Parameters below as of 06 Dec 2023 08:05  Patient On (Oxygen Delivery Method): room air        PHYSICAL EXAM:  General: well appearing, no distress  HEENT: sclera anicteric  Neck: supple, no carotid bruits b/l  CVS: JVP ~ 7 cm H20, RRR, s1, s2, no murmurs/rubs/gallops  Chest: unlabored respirations, clear to auscultation b/l  Abdomen: non-distended  Extremities: no lower extremity edema b/l  Neuro: awake, alert & oriented x 3  Psych: normal affect      LABS:                        12.8   6.35  )-----------( 217      ( 06 Dec 2023 08:05 )             37.5     12-    143  |  106  |  10  ----------------------------<  116<H>  3.9   |  27  |  0.63    Ca    9.0      06 Dec 2023 08:05  Mg     2.2     12-    TPro  7.2  /  Alb  3.8  /  TBili  0.4  /  DBili  x   /  AST  32  /  ALT  39  /  AlkPhos  73  12-        EC23 NSR      TTE:      JOSÉ MIGUEL KAPLAN  [id number]      HPI: 89 year-old female past medical history of Mitral valve prolapse, Hypertension, Hyperlipidemia, GERD presents to the ED with chest pain that started two night ago. Patient first felt chest pain two nights ago, pain went away but felt it again this morning when she woke up. She reports left sided pressure like chest pain, wraps around lateral chest and pain is worse with movement, associated with shortness of breath.  No leg pain, calf pain or swelling.        ALLERGIES:  Seafood (Unknown)  No Known Drug Allergies      PAST MEDICAL & SURGICAL HISTORY:  HTN (hypertension)  GERD (gastroesophageal reflux disease)  Mitral valve prolapse      CURRENT MEDICATIONS:  MEDICATIONS  (STANDING):  atenolol  Tablet 100 milliGRAM(s) Oral daily  atorvastatin 20 milliGRAM(s) Oral at bedtime  enoxaparin Injectable 70 milliGRAM(s) SubCutaneous every 12 hours  famotidine    Tablet 20 milliGRAM(s) Oral daily  sertraline 150 milliGRAM(s) Oral daily    SOCIAL HISTORY:  former smoker 30 yrs ago 1PPD    FAMILY HISTORY:  FH: CAD (coronary artery disease)  HTN      ROS:  All 10 systems reviewed and positives noted in HPI    OBJECTIVE:    VITAL SIGNS:  Vital Signs Last 24 Hrs  T(C): 36.2 (06 Dec 2023 07:41), Max: 36.2 (06 Dec 2023 07:41)  T(F): 97.2 (06 Dec 2023 07:41), Max: 97.2 (06 Dec 2023 07:41)  HR: 75 (06 Dec 2023 08:05) (72 - 75)  BP: 141/90 (06 Dec 2023 08:05) (141/90 - 155/80)  BP(mean): --  RR: 17 (06 Dec 2023 08:05) (17 - 18)  SpO2: 97% (06 Dec 2023 08:05) (95% - 97%)    Parameters below as of 06 Dec 2023 08:05  Patient On (Oxygen Delivery Method): room air      PHYSICAL EXAM:  General: no distress  HEENT: sclera anicteric  Neck: supple  CVS: JVP ~ 7 cm H20, RRR, s1, s2, no murmurs/rubs/gallops  Chest: unlabored respirations, clear to auscultation b/l  Abdomen: non-distended  Extremities: no lower extremity edema b/l  Neuro: awake, alert & oriented  Psych: normal affect      LABS:                        12.8   6.35  )-----------( 217      ( 06 Dec 2023 08:05 )             37.5         143  |  106  |  10  ----------------------------<  116<H>  3.9   |  27  |  0.63    Ca    9.0      06 Dec 2023 08:05  Mg     2.2         TPro  7.2  /  Alb  3.8  /  TBili  0.4  /  DBili  x   /  AST  32  /  ALT  39  /  AlkPhos  73        EC23 NSR     JOSÉ MIGUEL KAPLAN  [id number]      HPI: 89 year-old female past medical history of Mitral valve prolapse, Hypertension, Hyperlipidemia, GERD presents to the ED with chest pain that started two night ago. Patient first felt chest pain two nights ago, pain went away but felt it again this morning when she woke up. She reports left sided pressure like chest pain, wraps around lateral chest and pain is worse with movement, associated with shortness of breath.  No leg pain, calf pain or swelling.        ALLERGIES:  Seafood (Unknown)  No Known Drug Allergies      PAST MEDICAL & SURGICAL HISTORY:  HTN (hypertension)  GERD (gastroesophageal reflux disease)  Mitral valve prolapse      CURRENT MEDICATIONS:  MEDICATIONS  (STANDING):  atenolol  Tablet 100 milliGRAM(s) Oral daily  atorvastatin 20 milliGRAM(s) Oral at bedtime  enoxaparin Injectable 70 milliGRAM(s) SubCutaneous every 12 hours  famotidine    Tablet 20 milliGRAM(s) Oral daily  sertraline 150 milliGRAM(s) Oral daily    SOCIAL HISTORY:  former smoker 30 yrs ago 1PPD    FAMILY HISTORY:  FH: CAD (coronary artery disease)  HTN      ROS:  All 10 systems reviewed and positives noted in HPI    OBJECTIVE:    VITAL SIGNS:  Vital Signs Last 24 Hrs  T(C): 36.2 (06 Dec 2023 07:41), Max: 36.2 (06 Dec 2023 07:41)  T(F): 97.2 (06 Dec 2023 07:41), Max: 97.2 (06 Dec 2023 07:41)  HR: 75 (06 Dec 2023 08:05) (72 - 75)  BP: 141/90 (06 Dec 2023 08:05) (141/90 - 155/80)  BP(mean): --  RR: 17 (06 Dec 2023 08:05) (17 - 18)  SpO2: 97% (06 Dec 2023 08:05) (95% - 97%)    Parameters below as of 06 Dec 2023 08:05  Patient On (Oxygen Delivery Method): room air      PHYSICAL EXAM:  General: no distress  HEENT: sclera anicteric  Neck: supple  CVS: JVP ~ 7 cm H20, RRR, s1, s2, no murmurs/rubs/gallops  Chest: unlabored respirations, clear to auscultation b/l  Abdomen: non-distended  Extremities: no lower extremity edema b/l  Neuro: awake, alert & oriented  Psych: normal affect      LABS:                        12.8   6.35  )-----------( 217      ( 06 Dec 2023 08:05 )             37.5         143  |  106  |  10  ----------------------------<  116<H>  3.9   |  27  |  0.63    Ca    9.0      06 Dec 2023 08:05  Mg     2.2         TPro  7.2  /  Alb  3.8  /  TBili  0.4  /  DBili  x   /  AST  32  /  ALT  39  /  AlkPhos  73        EC23 NSR

## 2023-12-06 NOTE — CONSULT NOTE ADULT - ASSESSMENT
9 year-old female past medical history mitral valve prolapse, hypertension, HLD, GERD presents to the ED with intermittent chest pain that started two nights ago. CTA chest showing subsegmental PE on the right side as well as nodular opacification and septal thickening. Remote history of smoking, .denies any history of lung disease. Echo with out evidence of LV dysfunction or RV strain. Lower extremity dopplers + for DVT    Assessment:  1. Venous thromboembolism - right sided PE and bilateral lower extremity DVT  2. Pleural based lung nodules  3. Interlobular septal thickening - ?pulmonary edema vs. Interstitial lung disease    Plan:  - Agree with anticoagulation with lovenox, can likely transition to DOAC  - Monitor hemodynamics and respiratory status  - Cardiology consult noted, unlikely ischemic cardiac event  - Check autoimmune serologies  - Short term CT scan of the chest in 4-6 weeks to monitor  - Discussed with patient and daughter at bedside

## 2023-12-06 NOTE — H&P ADULT - ASSESSMENT
9 year-old female past medical history mitral valve prolapse, hypertension, HLD, GERD presents to the ED that started two night ago. Pt first felt chest pain two nights ago, pain went away but felt it again this morning when she woke up. Pt reports left sided pressure like chest pain, wraps around lateral chest and pain is worse with movement, associated with SOB.  No leg pain, calf pain or swelling.  No headache, fever, chills, cough, abd pain, n/v/d.        Chest pain likely due to acute PE  -cont with therapeutic dose lovenox. transition to DOAC eventually   -troponin negative x1. cont to trend  -check echo  -pulm consult, cardiology consult  -telemonitoring   -CTA chest showed segmental PE within right upper, middle and lower lobe. no right heart strain.  Interlobular septal thickening is identified.There is a region of nodular opacity identified within the right upper lobe measuring approximately 6 mm (series 303, image 167), indeterminate. A 1.0 cm region of nodular opacity is noted along the pleural surface of the right lower lobe posteriorly.      Nodular opacities and 1cm nodule on R lower lobe pleural surface  - pulm consult     HTN  HLD   - cont atenolol and lipitor     DVT ppx: full dose lovenox subq

## 2023-12-06 NOTE — ED PROVIDER NOTE - OBJECTIVE STATEMENT
89-year-old female past medical history hypertension GERD presents to the emergency department reporting chest pain that started the night before last.  Patient states that occurred while laying down in his sleep.  Patient states she had onset of left-sided chest pressure that radiated to her back.  Mild in nature.  No shortness of breath at that time.  Patient states the next morning which was yesterday morning she took Tylenol arthritis because she noted the pain was worse with movement and assumed that it was muscular.  Throughout the day the pain was improved however she generally felt unwell.  She informed her family.  She had decreased appetite and p.o. intake.  This morning, patient states pain was worsened with accompanying shortness of breath which concerned her and is the reason for her visit today.  Chest pressure and pain is located to the left chest just underneath the armpit left upper chest with radiation to the scapula.  Patient still reports pain worsened with movement.  Mild shortness of breath although now improved.  Took baby aspirin this morning.  No leg pain calf pain or swelling.  No numbness or tingling.  No cough or fever.  Patient originally lives in [place] however moved to the area 2 months ago due to her 's advanced illness.  He passed away 1 week ago.  In [place], patient had her key providers that monitored her regularly.  She has history of mitral valve prolapse and has had echoes in the past.  Medications are currently Cardizem, candesartan, atenolol, sertraline, famotidine.

## 2023-12-06 NOTE — ED PROVIDER NOTE - INTERPRETATION
normal sinus rhythm, Normal axis, Normal CO interval and QRS complex. There are no acute ischemic ST or T-wave changes. normal sinus rhythm, Normal axis, Normal AR interval and QRS complex. There are no acute ischemic ST or T-wave changes.

## 2023-12-06 NOTE — ED PROVIDER NOTE - CLINICAL SUMMARY MEDICAL DECISION MAKING FREE TEXT BOX
89-year-old female past medical history hypertension GERD presents to the emergency department reporting chest pain that started the night before last.  Patient states that occurred while laying down in his sleep.  Patient states she had onset of left-sided chest pressure that radiated to her back.  Mild in nature.  No shortness of breath at that time.  Patient states the next morning which was yesterday morning she took Tylenol arthritis because she noted the pain was worse with movement and assumed that it was muscular.  Throughout the day the pain was improved however she generally felt unwell.  She informed her family.  She had decreased appetite and p.o. intake.  This morning, patient states pain was worsened with accompanying shortness of breath which concerned her and is the reason for her visit today.  Chest pressure and pain is located to the left chest just underneath the armpit left upper chest with radiation to the scapula.  Patient still reports pain worsened with movement.  Mild shortness of breath although now improved.  Took baby aspirin this morning.  No leg pain calf pain or swelling.  No numbness or tingling.  No cough or fever.  Patient originally lives in [place] however moved to the area 2 months ago due to her 's advanced illness.  He passed away 1 week ago.  In [place], patient had her key providers that monitored her regularly.  She has history of mitral valve prolapse and has had echoes in the past.  Medications are currently Cardizem, candesartan, atenolol, sertraline, famotidine.  Exam as stated.   Per exam, consider MSK related pain however given age and PMH, will obtain labs with trop, dimer, bnp, cxr. Offered med prn nausea or pain however pt deferred at this time. 89-year-old female past medical history hypertension GERD presents to the emergency department reporting chest pain that started the night before last.  Patient states that occurred while laying down in his sleep.  Patient states she had onset of left-sided chest pressure that radiated to her back.  Mild in nature.  No shortness of breath at that time.  Patient states the next morning which was yesterday morning she took Tylenol arthritis because she noted the pain was worse with movement and assumed that it was muscular.  Throughout the day the pain was improved however she generally felt unwell.  She informed her family.  She had decreased appetite and p.o. intake.  This morning, patient states pain was worsened with accompanying shortness of breath which concerned her and is the reason for her visit today.  Chest pressure and pain is located to the left chest just underneath the armpit left upper chest with radiation to the scapula.  Patient still reports pain worsened with movement.  Mild shortness of breath although now improved.  Took baby aspirin this morning.  No leg pain calf pain or swelling.  No numbness or tingling.  No cough or fever.  Patient originally lives in [place] however moved to the area 2 months ago due to her 's advanced illness.  He passed away 1 week ago.  In [place], patient had her key providers that monitored her regularly.  She has history of mitral valve prolapse and has had echoes in the past.  Medications are currently Cardizem, candesartan, atenolol, sertraline, famotidine.  Exam as stated.   Per exam, consider MSK related pain however given age and PMH, will obtain labs with trop, dimer, bnp, cxr. Offered med prn nausea or pain however pt deferred at this time.    Dimer elevation further looked into with CTA. +PE. Will admit. Lovenox ordered. d/w hospitalist.

## 2023-12-06 NOTE — H&P ADULT - NSHPLABSRESULTS_GEN_ALL_CORE
LABS:                        12.8   6.35  )-----------( 217      ( 06 Dec 2023 08:05 )             37.5     12-06    143  |  106  |  10  ----------------------------<  116<H>  3.9   |  27  |  0.63    Ca    9.0      06 Dec 2023 08:05  Mg     2.2     12-06    TPro  7.2  /  Alb  3.8  /  TBili  0.4  /  DBili  x   /  AST  32  /  ALT  39  /  AlkPhos  73  12-06      Urinalysis Basic - ( 06 Dec 2023 08:05 )    Color: x / Appearance: x / SG: x / pH: x  Gluc: 116 mg/dL / Ketone: x  / Bili: x / Urobili: x   Blood: x / Protein: x / Nitrite: x   Leuk Esterase: x / RBC: x / WBC x   Sq Epi: x / Non Sq Epi: x / Bacteria: x       CAPILLARY BLOOD GLUCOSE        Urinalysis Basic - ( 06 Dec 2023 08:05 )    Color: x / Appearance: x / SG: x / pH: x  Gluc: 116 mg/dL / Ketone: x  / Bili: x / Urobili: x   Blood: x / Protein: x / Nitrite: x   Leuk Esterase: x / RBC: x / WBC x   Sq Epi: x / Non Sq Epi: x / Bacteria: x        RADIOLOGY & ADDITIONAL TESTS:     Consultant(s) Notes Reviewed:  [x ] YES  [ ] NO  Care Discussed with Consultants/Other Providers [ x] YES  [ ] NO d/w Dr. Stephens  Imaging Personally Reviewed:  [ ] YES  [ ] NO

## 2023-12-06 NOTE — ED PROVIDER NOTE - CARE PLAN
1 Principal Discharge DX:	Chest pain in adult   Principal Discharge DX:	Chest pain in adult  Secondary Diagnosis:	Pulmonary emboli

## 2023-12-06 NOTE — ED ADULT NURSE REASSESSMENT NOTE - NS ED NURSE REASSESS COMMENT FT1
Pt AAOX4, denies pain, ambulate to BR without assisted device. Instructed to call for assist. Call bell within reached.

## 2023-12-06 NOTE — ED ADULT NURSE NOTE - OBJECTIVE STATEMENT
Pt presents to ED, accompanied by daughter, with c/o chest pain starting two nights prior.  Pain is described as intermittent, aching sensation, worsening this morning, and localized to left breast.  Pt denies any SOB, chest palpitations.  EKG completed.  Continuous cardiac monitoring in place.

## 2023-12-06 NOTE — CONSULT NOTE ADULT - NS ATTEND AMEND GEN_ALL_CORE FT
I have seen and examined the patient. I have discussed case with HANNY Kim.    Lisa Philip is an 83 year old woman with past medical history of Mitral valve prolapse, Hypertension, Hyperlipidemia and GERD who presents with acute left-sided chest discomfort for past two days, found to have segmental pulmonary emboli in right upper lobe, right middle lobe and right lower lobe.    ECG consistent with normal sinus rhythm, no acute ischemic ST abnormalities. Pro BNP mildly elevated, troponins negative x 2, appears to be more low-to-intermediate risk pulmonary embolism at this time. Her chest pain is likely from pulmonary embolism and not an acute coronary syndrome. CT with no right heart strain. Hemodynamics stable.    Overall, recommend anticoagulation for pulmonary embolism, eventual transition to NOAC. Would consult Hematology as etiology of PE is unknown at this time, check lower extremity venous US to evaluate for DVT. Follow up Pulmonary consult. Check echo to evaluate for right heart dysfunction (CT chest with no right heart strain).     Will sign out this case to cardiologist to follow along tomorrow.    Cate Doss MD  Cardiology

## 2023-12-06 NOTE — CONSULT NOTE ADULT - ASSESSMENT
Assessment: 89 year-old female past medical history mitral valve prolapse, hypertension, HLD, GERD c/o chest pain x 2 nights. Pain occured while resting and worsens with certain movements. States pain wraps around to her back and is associated with SOB.  No c/o ha, lightheadedness, palps, fever, or chills. Denies a sedentary lifestyle or recent travel. Cardiology consulted for chest pain    Recommendations:  [ ] chest pain: ekg NSR and non ischemic. trop neg x 2. Pt follows with a cardiologist in [place], awaiting to transfer to a new one but states she follows up routinely and has had normal stress tests so far. Recommend TTE to assess cardiac structure and RV strain. Chest pain MSK related vs 2/2 PE  [ ] PE: CTA confirmed segmental PE within right upper, middle and lower lobe. Pt currently on lovenox, eventual transition to DOAC. Recommend l/e doppler to assess for DVT.     Aida Kim [facility] Assessment: 89 year-old female past medical history mitral valve prolapse, hypertension, HLD, GERD c/o chest pain x 2 nights. Pain occured while resting and worsens with certain movements. States pain wraps around to her back and is associated with SOB.  No c/o ha, lightheadedness, palps, fever, or chills. Denies a sedentary lifestyle or recent travel. Cardiology consulted for chest pain    Recommendations:  [ ] chest pain: ekg NSR and non ischemic. trop neg x 2. Pt follows with a cardiologist in [place], awaiting to transfer to a new one but states she follows up routinely and has had normal stress tests so far. Recommend TTE to assess cardiac structure and RV strain. Chest pain MSK related vs 2/2 PE  [ ] PE: CTA confirmed segmental PE within right upper, middle and lower lobe. Pt currently on lovenox, eventual transition to DOAC. Recommend l/e doppler to assess for DVT.     Aida Kim [facility] Assessment: 89 year-old female past medical history of Mitral valve prolapse, Hypertension, Hyperlipidemia, GERD present with chest pain x 2 nights. Pain occurred while resting and worsens with certain movements. States pain wraps around to her back and is associated with shortness of breath. Denies a sedentary lifestyle or recent travel. Cardiology consulted for chest pain    Recommendations:  [] Chest pain: EKG NSR and non ischemic. Troponin negative x 3. Patient follows with a cardiologist in [place], awaiting to transfer to a new one but states she follows up routinely and has had normal stress tests so far. Recommend TTE to assess cardiac structure and RV strain. Chest pain MSK related vs 2/2 pulmonary embolism  [] Pulmonary emboli: CTA confirmed segmental PE within right upper, middle and lower lobe. Patient currently on lovenox, eventual transition to DOAC. Recommend lower extremity doppler to assess for DVT.     Aida Kim [facility] Assessment: 89 year-old female past medical history of Mitral valve prolapse, Hypertension, Hyperlipidemia, GERD present with chest pain x 2 nights. Pain occurred while resting and worsens with certain movements. States pain wraps around to her back and is associated with shortness of breath. Denies a sedentary lifestyle or recent travel. Cardiology consulted for chest pain    Recommendations:  [] Chest pain: EKG NSR and non ischemic. Troponin negative x 3. Patient follows with a cardiologist in [place], awaiting to transfer to a new one but states she follows up routinely and has had normal stress tests so far. Recommend TTE to assess cardiac structure and RV strain. Chest pain MSK related vs 2/2 pulmonary embolism  [] Pulmonary emboli: CTA confirmed segmental PE within right upper, middle and lower lobe. Patient currently on lovenox, eventual transition to DOAC. Recommend lower extremity doppler to assess for DVT.     Aida Kim [facility]

## 2023-12-06 NOTE — ED ADULT TRIAGE NOTE - CHIEF COMPLAINT QUOTE
C/o left sided chest pain and left sided back pain with some SOB. Symptoms started yesterday and got worse today.

## 2023-12-07 ENCOUNTER — TRANSCRIPTION ENCOUNTER (OUTPATIENT)
Age: 83
End: 2023-12-07

## 2023-12-07 VITALS
HEART RATE: 68 BPM | TEMPERATURE: 97 F | SYSTOLIC BLOOD PRESSURE: 136 MMHG | DIASTOLIC BLOOD PRESSURE: 69 MMHG | OXYGEN SATURATION: 95 % | RESPIRATION RATE: 17 BRPM

## 2023-12-07 LAB
A1C WITH ESTIMATED AVERAGE GLUCOSE RESULT: 6 % — HIGH (ref 4–5.6)
A1C WITH ESTIMATED AVERAGE GLUCOSE RESULT: 6 % — HIGH (ref 4–5.6)
ANION GAP SERPL CALC-SCNC: 9 MMOL/L — SIGNIFICANT CHANGE UP (ref 5–17)
ANION GAP SERPL CALC-SCNC: 9 MMOL/L — SIGNIFICANT CHANGE UP (ref 5–17)
BASOPHILS # BLD AUTO: 0.06 K/UL — SIGNIFICANT CHANGE UP (ref 0–0.2)
BASOPHILS # BLD AUTO: 0.06 K/UL — SIGNIFICANT CHANGE UP (ref 0–0.2)
BASOPHILS NFR BLD AUTO: 0.9 % — SIGNIFICANT CHANGE UP (ref 0–2)
BASOPHILS NFR BLD AUTO: 0.9 % — SIGNIFICANT CHANGE UP (ref 0–2)
BUN SERPL-MCNC: 16 MG/DL — SIGNIFICANT CHANGE UP (ref 7–23)
BUN SERPL-MCNC: 16 MG/DL — SIGNIFICANT CHANGE UP (ref 7–23)
CALCIUM SERPL-MCNC: 9.2 MG/DL — SIGNIFICANT CHANGE UP (ref 8.4–10.5)
CALCIUM SERPL-MCNC: 9.2 MG/DL — SIGNIFICANT CHANGE UP (ref 8.4–10.5)
CHLORIDE SERPL-SCNC: 106 MMOL/L — SIGNIFICANT CHANGE UP (ref 96–108)
CHLORIDE SERPL-SCNC: 106 MMOL/L — SIGNIFICANT CHANGE UP (ref 96–108)
CHOLEST SERPL-MCNC: 192 MG/DL — SIGNIFICANT CHANGE UP
CHOLEST SERPL-MCNC: 192 MG/DL — SIGNIFICANT CHANGE UP
CO2 SERPL-SCNC: 26 MMOL/L — SIGNIFICANT CHANGE UP (ref 22–31)
CO2 SERPL-SCNC: 26 MMOL/L — SIGNIFICANT CHANGE UP (ref 22–31)
CREAT SERPL-MCNC: 0.72 MG/DL — SIGNIFICANT CHANGE UP (ref 0.5–1.3)
CREAT SERPL-MCNC: 0.72 MG/DL — SIGNIFICANT CHANGE UP (ref 0.5–1.3)
EGFR: 83 ML/MIN/1.73M2 — SIGNIFICANT CHANGE UP
EGFR: 83 ML/MIN/1.73M2 — SIGNIFICANT CHANGE UP
EOSINOPHIL # BLD AUTO: 0.18 K/UL — SIGNIFICANT CHANGE UP (ref 0–0.5)
EOSINOPHIL # BLD AUTO: 0.18 K/UL — SIGNIFICANT CHANGE UP (ref 0–0.5)
EOSINOPHIL NFR BLD AUTO: 2.8 % — SIGNIFICANT CHANGE UP (ref 0–6)
EOSINOPHIL NFR BLD AUTO: 2.8 % — SIGNIFICANT CHANGE UP (ref 0–6)
ESTIMATED AVERAGE GLUCOSE: 126 MG/DL — HIGH (ref 68–114)
ESTIMATED AVERAGE GLUCOSE: 126 MG/DL — HIGH (ref 68–114)
GLUCOSE SERPL-MCNC: 112 MG/DL — HIGH (ref 70–99)
GLUCOSE SERPL-MCNC: 112 MG/DL — HIGH (ref 70–99)
HCT VFR BLD CALC: 38.3 % — SIGNIFICANT CHANGE UP (ref 34.5–45)
HCT VFR BLD CALC: 38.3 % — SIGNIFICANT CHANGE UP (ref 34.5–45)
HDLC SERPL-MCNC: 46 MG/DL — LOW
HDLC SERPL-MCNC: 46 MG/DL — LOW
HGB BLD-MCNC: 12.7 G/DL — SIGNIFICANT CHANGE UP (ref 11.5–15.5)
HGB BLD-MCNC: 12.7 G/DL — SIGNIFICANT CHANGE UP (ref 11.5–15.5)
IMM GRANULOCYTES NFR BLD AUTO: 0.3 % — SIGNIFICANT CHANGE UP (ref 0–0.9)
IMM GRANULOCYTES NFR BLD AUTO: 0.3 % — SIGNIFICANT CHANGE UP (ref 0–0.9)
LIPID PNL WITH DIRECT LDL SERPL: 118 MG/DL — HIGH
LIPID PNL WITH DIRECT LDL SERPL: 118 MG/DL — HIGH
LYMPHOCYTES # BLD AUTO: 1.83 K/UL — SIGNIFICANT CHANGE UP (ref 1–3.3)
LYMPHOCYTES # BLD AUTO: 1.83 K/UL — SIGNIFICANT CHANGE UP (ref 1–3.3)
LYMPHOCYTES # BLD AUTO: 28.8 % — SIGNIFICANT CHANGE UP (ref 13–44)
LYMPHOCYTES # BLD AUTO: 28.8 % — SIGNIFICANT CHANGE UP (ref 13–44)
MCHC RBC-ENTMCNC: 31.1 PG — SIGNIFICANT CHANGE UP (ref 27–34)
MCHC RBC-ENTMCNC: 31.1 PG — SIGNIFICANT CHANGE UP (ref 27–34)
MCHC RBC-ENTMCNC: 33.2 GM/DL — SIGNIFICANT CHANGE UP (ref 32–36)
MCHC RBC-ENTMCNC: 33.2 GM/DL — SIGNIFICANT CHANGE UP (ref 32–36)
MCV RBC AUTO: 93.9 FL — SIGNIFICANT CHANGE UP (ref 80–100)
MCV RBC AUTO: 93.9 FL — SIGNIFICANT CHANGE UP (ref 80–100)
MONOCYTES # BLD AUTO: 0.76 K/UL — SIGNIFICANT CHANGE UP (ref 0–0.9)
MONOCYTES # BLD AUTO: 0.76 K/UL — SIGNIFICANT CHANGE UP (ref 0–0.9)
MONOCYTES NFR BLD AUTO: 11.9 % — SIGNIFICANT CHANGE UP (ref 2–14)
MONOCYTES NFR BLD AUTO: 11.9 % — SIGNIFICANT CHANGE UP (ref 2–14)
NEUTROPHILS # BLD AUTO: 3.51 K/UL — SIGNIFICANT CHANGE UP (ref 1.8–7.4)
NEUTROPHILS # BLD AUTO: 3.51 K/UL — SIGNIFICANT CHANGE UP (ref 1.8–7.4)
NEUTROPHILS NFR BLD AUTO: 55.3 % — SIGNIFICANT CHANGE UP (ref 43–77)
NEUTROPHILS NFR BLD AUTO: 55.3 % — SIGNIFICANT CHANGE UP (ref 43–77)
NON HDL CHOLESTEROL: 146 MG/DL — HIGH
NON HDL CHOLESTEROL: 146 MG/DL — HIGH
NRBC # BLD: 0 /100 WBCS — SIGNIFICANT CHANGE UP (ref 0–0)
NRBC # BLD: 0 /100 WBCS — SIGNIFICANT CHANGE UP (ref 0–0)
PLATELET # BLD AUTO: 198 K/UL — SIGNIFICANT CHANGE UP (ref 150–400)
PLATELET # BLD AUTO: 198 K/UL — SIGNIFICANT CHANGE UP (ref 150–400)
POTASSIUM SERPL-MCNC: 4.2 MMOL/L — SIGNIFICANT CHANGE UP (ref 3.5–5.3)
POTASSIUM SERPL-MCNC: 4.2 MMOL/L — SIGNIFICANT CHANGE UP (ref 3.5–5.3)
POTASSIUM SERPL-SCNC: 4.2 MMOL/L — SIGNIFICANT CHANGE UP (ref 3.5–5.3)
POTASSIUM SERPL-SCNC: 4.2 MMOL/L — SIGNIFICANT CHANGE UP (ref 3.5–5.3)
RBC # BLD: 4.08 M/UL — SIGNIFICANT CHANGE UP (ref 3.8–5.2)
RBC # BLD: 4.08 M/UL — SIGNIFICANT CHANGE UP (ref 3.8–5.2)
RBC # FLD: 12.6 % — SIGNIFICANT CHANGE UP (ref 10.3–14.5)
RBC # FLD: 12.6 % — SIGNIFICANT CHANGE UP (ref 10.3–14.5)
RHEUMATOID FACT SERPL-ACNC: <10 IU/ML — SIGNIFICANT CHANGE UP (ref 0–13)
RHEUMATOID FACT SERPL-ACNC: <10 IU/ML — SIGNIFICANT CHANGE UP (ref 0–13)
SODIUM SERPL-SCNC: 141 MMOL/L — SIGNIFICANT CHANGE UP (ref 135–145)
SODIUM SERPL-SCNC: 141 MMOL/L — SIGNIFICANT CHANGE UP (ref 135–145)
TRIGL SERPL-MCNC: 162 MG/DL — HIGH
TRIGL SERPL-MCNC: 162 MG/DL — HIGH
WBC # BLD: 6.36 K/UL — SIGNIFICANT CHANGE UP (ref 3.8–10.5)
WBC # BLD: 6.36 K/UL — SIGNIFICANT CHANGE UP (ref 3.8–10.5)
WBC # FLD AUTO: 6.36 K/UL — SIGNIFICANT CHANGE UP (ref 3.8–10.5)
WBC # FLD AUTO: 6.36 K/UL — SIGNIFICANT CHANGE UP (ref 3.8–10.5)

## 2023-12-07 PROCEDURE — 80048 BASIC METABOLIC PNL TOTAL CA: CPT

## 2023-12-07 PROCEDURE — 80053 COMPREHEN METABOLIC PANEL: CPT

## 2023-12-07 PROCEDURE — 85379 FIBRIN DEGRADATION QUANT: CPT

## 2023-12-07 PROCEDURE — 96361 HYDRATE IV INFUSION ADD-ON: CPT

## 2023-12-07 PROCEDURE — 86200 CCP ANTIBODY: CPT

## 2023-12-07 PROCEDURE — 93970 EXTREMITY STUDY: CPT

## 2023-12-07 PROCEDURE — 36415 COLL VENOUS BLD VENIPUNCTURE: CPT

## 2023-12-07 PROCEDURE — 83735 ASSAY OF MAGNESIUM: CPT

## 2023-12-07 PROCEDURE — 86038 ANTINUCLEAR ANTIBODIES: CPT

## 2023-12-07 PROCEDURE — 83880 ASSAY OF NATRIURETIC PEPTIDE: CPT

## 2023-12-07 PROCEDURE — 83036 HEMOGLOBIN GLYCOSYLATED A1C: CPT

## 2023-12-07 PROCEDURE — 96360 HYDRATION IV INFUSION INIT: CPT

## 2023-12-07 PROCEDURE — 93306 TTE W/DOPPLER COMPLETE: CPT

## 2023-12-07 PROCEDURE — 84484 ASSAY OF TROPONIN QUANT: CPT

## 2023-12-07 PROCEDURE — 85025 COMPLETE CBC W/AUTO DIFF WBC: CPT

## 2023-12-07 PROCEDURE — 86431 RHEUMATOID FACTOR QUANT: CPT

## 2023-12-07 PROCEDURE — 71045 X-RAY EXAM CHEST 1 VIEW: CPT

## 2023-12-07 PROCEDURE — 99239 HOSP IP/OBS DSCHRG MGMT >30: CPT

## 2023-12-07 PROCEDURE — 99285 EMERGENCY DEPT VISIT HI MDM: CPT

## 2023-12-07 PROCEDURE — 80061 LIPID PANEL: CPT

## 2023-12-07 PROCEDURE — 86235 NUCLEAR ANTIGEN ANTIBODY: CPT

## 2023-12-07 PROCEDURE — 86036 ANCA SCREEN EACH ANTIBODY: CPT

## 2023-12-07 PROCEDURE — 71275 CT ANGIOGRAPHY CHEST: CPT | Mod: MA

## 2023-12-07 PROCEDURE — 83690 ASSAY OF LIPASE: CPT

## 2023-12-07 PROCEDURE — 93005 ELECTROCARDIOGRAM TRACING: CPT

## 2023-12-07 RX ORDER — APIXABAN 2.5 MG/1
1 TABLET, FILM COATED ORAL
Qty: 60 | Refills: 0
Start: 2023-12-07 | End: 2024-01-05

## 2023-12-07 RX ORDER — APIXABAN 2.5 MG/1
1 TABLET, FILM COATED ORAL
Qty: 74 | Refills: 0
Start: 2023-12-07 | End: 2024-01-05

## 2023-12-07 RX ORDER — APIXABAN 2.5 MG/1
10 TABLET, FILM COATED ORAL EVERY 12 HOURS
Refills: 0 | Status: DISCONTINUED | OUTPATIENT
Start: 2023-12-07 | End: 2023-12-07

## 2023-12-07 RX ORDER — APIXABAN 2.5 MG/1
2 TABLET, FILM COATED ORAL
Qty: 28 | Refills: 0
Start: 2023-12-07 | End: 2023-12-13

## 2023-12-07 RX ORDER — INFLUENZA VIRUS VACCINE 15; 15; 15; 15 UG/.5ML; UG/.5ML; UG/.5ML; UG/.5ML
0.7 SUSPENSION INTRAMUSCULAR ONCE
Refills: 0 | Status: DISCONTINUED | OUTPATIENT
Start: 2023-12-07 | End: 2023-12-07

## 2023-12-07 RX ADMIN — SERTRALINE 150 MILLIGRAM(S): 25 TABLET, FILM COATED ORAL at 11:28

## 2023-12-07 RX ADMIN — APIXABAN 10 MILLIGRAM(S): 2.5 TABLET, FILM COATED ORAL at 12:14

## 2023-12-07 RX ADMIN — FAMOTIDINE 20 MILLIGRAM(S): 10 INJECTION INTRAVENOUS at 11:28

## 2023-12-07 RX ADMIN — Medication 650 MILLIGRAM(S): at 14:07

## 2023-12-07 RX ADMIN — ATENOLOL 100 MILLIGRAM(S): 25 TABLET ORAL at 08:14

## 2023-12-07 RX ADMIN — ENOXAPARIN SODIUM 70 MILLIGRAM(S): 100 INJECTION SUBCUTANEOUS at 00:20

## 2023-12-07 NOTE — DISCHARGE NOTE NURSING/CASE MANAGEMENT/SOCIAL WORK - PATIENT PORTAL LINK FT
You can access the FollowMyHealth Patient Portal offered by [facility] by registering at the following website: [email or web address]. By joining [facility]’s FollowMyHealth portal, you will also be able to view your health information using other applications (apps) compatible with our system. You can access the FollowMyHealth Patient Portal offered by [facility] by registering at the following website: [email or web address]. By joining [facility]’s FollowMyHealth portal, you will also be able to view your health information using other applications (apps) compatible with our system.

## 2023-12-07 NOTE — DISCHARGE NOTE PROVIDER - CARE PROVIDER_API CALL
Moises Clemons  Critical Care Medicine  [address]  [place] [address]  Phone: [phone]  Fax: [phone]  Follow Up Time:     Guido Delgado  Internal Medicine  [address] [place] [address]  Phone: [phone]  Fax: [phone]  Follow Up Time:    Moises Clemons  Critical Care Medicine  [address] [facility] [address]  [place] [address]  Phone: [phone]  Fax: [phone]  Follow Up Time:     Guido Delgado  Internal Medicine  [address] [place] [address]  Phone: [phone]  Fax: [phone]  Follow Up Time:

## 2023-12-07 NOTE — PROGRESS NOTE ADULT - ASSESSMENT
Physical Examination:  GENERAL:               Alert, Oriented, No acute distress.    HEENT:                    Pupils equal, reactive to light.  Symmetric. No JVD, Moist MM  PULM:                     Bilateral air entry, Clear to auscultation bilaterally, No Rales, No Rhonchi, No Wheezing  CVS:                         S1, S2,  No Murmur  ABD:                        Soft, nondistended, nontender, normoactive bowel sounds,   EXT:                         No edema, nontender, No Cyanosis or Clubbing   Vascular:                Warm Extremities, Normal Capillary refill, Normal Distal Pulses  SKIN:                       Warm and well perfused, no rashes noted.   NEURO:                  Alert, oriented, interactive, nonfocal, follows commands  PSYC:                      Calm, + Insight.    83 year-old female past medical history mitral valve prolapse, hypertension, HLD, GERD presents to the ED with intermittent chest pain that started two nights ago. CTA chest showing subsegmental PE on the right side as well as nodular opacification and septal thickening. Remote history of smoking, .denies any history of lung disease. Echo with out evidence of LV dysfunction or RV strain. Lower extremity dopplers + for DVT    Assessment:  1. Venous thromboembolism - right sided PE and bilateral lower extremity DVT  2. Pleural based lung nodules  3. Interlobular septal thickening - ?pulmonary edema vs. Interstitial lung disease    Plan:  - Transitioned to DOAC   - Educated about monitoring for signs of bleeding, including in urine and stool  - Will need workup for VTE  - Monitor hemodynamics and respiratory status  - Cardiology consult noted, unlikely ischemic cardiac event  - F/u autoimmune serologies  - Short term CT scan of the chest in 4-6 weeks to monitor as outpatient   - Discussed with patient  - Can discharge home with out patient pulmonary follow up 
83 year-old female past medical history mitral valve prolapse, hypertension, HLD, GERD presents to the ED that started two night ago. Pt first felt chest pain two nights ago, pain went away but felt it again this morning when she woke up. Pt reports left sided pressure like chest pain, wraps around lateral chest and pain is worse with movement, associated with SOB.  No leg pain, calf pain or swelling.  No headache, fever, chills, cough, abd pain, n/v/d.      Acute PE   Chest pain likely due to acute PE vs MSK   -pt sating well on RA  -switch to Eliquis 10mg BID for first 7 days and then 5mg BID   -troponin negative x2  -echo showed EF 55-60%, mildly increased LV wall thickness. LA enlargement. mild to moderate MR. mild AR.  -pulm consult appreciated, cont AC and check autoimmune serologies   -cardiology consult appreciated, unlikely ischemic cardiac event   -telemonitoring   -CTA chest showed segmental PE within right upper, middle and lower lobe. no right heart strain.  Interlobular septal thickening is identified. There is a region of nodular opacity identified within the right upper lobe measuring approximately 6 mm (series 303, image 167), indeterminate. A 1.0 cm region of nodular opacity is noted along the pleural surface of the right lower lobe posteriorly.    Acute bilateral soleal DVT   - cont with Eliquis     Nodular opacities and 1cm nodule on R lower lobe pleural surface  Interlobular septal thickening - ?pulmonary edema vs. Interstitial lung disease  - pt appears euvolemic   - pulm consult  appreciated. check autoimmune serologies     HTN  HLD   - cont atenolol and lipitor     DVT ppx: full dose lovenox subq

## 2023-12-07 NOTE — DISCHARGE NOTE PROVIDER - HOSPITAL COURSE
83 year-old female past medical history mitral valve prolapse, hypertension, HLD, GERD presents to the ED that started two night ago. Pt first felt chest pain two nights ago, pain went away but felt it again this morning when she woke up. Pt reports left sided pressure like chest pain, wraps around lateral chest and pain is worse with movement, associated with SOB. Pt found with right sided segmental PE and bilateral soleal DVT. Pt also found with R pleural nodule. Pt tolerated Eliquis. Pt seen by cardiology, felt that chest pain was not due to ischemic cardiac event, felt that it was due to underlying PE/ MSK etiology. Pt also seen by pulmonary, autoimmune serologies send and recommend to follow up as outpt.      discharge provider: Anselmo Bhatti [phone] please call with any questions    antibiotics: none     PMD: no PMD for now. pt just moved to [place] 83 year-old female past medical history mitral valve prolapse, hypertension, HLD, GERD presents to the ED that started two night ago. Pt first felt chest pain two nights ago, pain went away but felt it again this morning when she woke up. Pt reports left sided pressure like chest pain, wraps around lateral chest and pain is worse with movement, associated with SOB. Pt found with right sided segmental PE and bilateral soleal DVT. Pt also found with R pleural nodule. Pt tolerated Eliquis. Pt seen by cardiology, felt that chest pain was not due to ischemic cardiac event, felt that it was due to underlying PE/ MSK etiology. Pt also seen by pulmonary, autoimmune serologies send and recommend to follow up as outpt.      discharge provider: Anselmo Bhatti [phone] please call with any questions    antibiotics: none     PMD: no PMD for now. pt just moved to [place]

## 2023-12-07 NOTE — PATIENT PROFILE ADULT - FUNCTIONAL ASSESSMENT - BASIC MOBILITY 6.
4-calculated by average/Not able to assess (calculate score using [facility] averaging method)  4-calculated by average/Not able to assess (calculate score using [facility] averaging method)

## 2023-12-07 NOTE — PROGRESS NOTE ADULT - SUBJECTIVE AND OBJECTIVE BOX
Patient is a 83y old  Female who presents with a chief complaint of acute PE (06 Dec 2023 18:40)      Subjective and overnight events:  Patient seen and examined at bedside. reports chest pain resolved today. no fever, chills, sob, abd pain. no leg pain or leg swelling.    ALLERGIES:  Seafood (Unknown)  No Known Drug Allergies    MEDICATIONS  (STANDING):  apixaban 10 milliGRAM(s) Oral every 12 hours  atenolol  Tablet 100 milliGRAM(s) Oral daily  atorvastatin 20 milliGRAM(s) Oral at bedtime  famotidine    Tablet 20 milliGRAM(s) Oral daily  influenza  Vaccine (HIGH DOSE) 0.7 milliLiter(s) IntraMuscular once  sertraline 150 milliGRAM(s) Oral daily    MEDICATIONS  (PRN):  acetaminophen     Tablet .. 650 milliGRAM(s) Oral every 6 hours PRN Temp greater or equal to 38C (100.4F), Mild Pain (1 - 3)  aluminum hydroxide/magnesium hydroxide/simethicone Suspension 30 milliLiter(s) Oral every 4 hours PRN Dyspepsia  melatonin 3 milliGRAM(s) Oral at bedtime PRN Insomnia  ondansetron Injectable 4 milliGRAM(s) IV Push every 8 hours PRN Nausea and/or Vomiting    Vital Signs Last 24 Hrs  T(F): 97.4 (07 Dec 2023 08:31), Max: 97.6 (07 Dec 2023 00:00)  HR: 68 (07 Dec 2023 08:31) (64 - 74)  BP: 136/69 (07 Dec 2023 08:31) (128/70 - 151/72)  RR: 17 (07 Dec 2023 08:31) (15 - 17)  SpO2: 95% (07 Dec 2023 08:31) (95% - 98%)  I&O's Summary    PHYSICAL EXAM:  General: NAD, A/O x 3  ENT: MMM  Neck: Supple, No JVD  Lungs: Clear to auscultation bilaterally  Cardio: RRR, S1/S2, No murmurs  Abdomen: Soft, Nontender, Nondistended; Bowel sounds present  Extremities: No calf tenderness, No pitting edema    LABS:                        12.7   6.36  )-----------( 198      ( 07 Dec 2023 06:30 )             38.3     12-07    141  |  106  |  16  ----------------------------<  112  4.2   |  26  |  0.72    Ca    9.2      07 Dec 2023 06:30  Mg     2.2     12-06    TPro  7.2  /  Alb  3.8  /  TBili  0.4  /  DBili  x   /  AST  32  /  ALT  39  /  AlkPhos  73  12-06          CARDIAC MARKERS ( 06 Dec 2023 12:50 )  x     / 6.0 ng/L / x     / x     / x      CARDIAC MARKERS ( 06 Dec 2023 08:05 )  x     / 5.8 ng/L / x     / x     / x            Urinalysis Basic - ( 07 Dec 2023 06:30 )    Color: x / Appearance: x / SG: x / pH: x  Gluc: 112 mg/dL / Ketone: x  / Bili: x / Urobili: x   Blood: x / Protein: x / Nitrite: x   Leuk Esterase: x / RBC: x / WBC x   Sq Epi: x / Non Sq Epi: x / Bacteria: x            RADIOLOGY & ADDITIONAL TESTS:     < from: US Duplex Venous Lower Ext Complete, Bilateral (12.06.23 @ 15:27) >  IMPRESSION:    Bilateral soleal vein DVTs.        --- End of Report ---      < end of copied text >    Care Discussed with Consultants/Other Providers: d/w pulm, cont with AC  
Follow-up Pulmonary Progress Note  Chief Complaint : Chest pain    Comfortable, denies any chest pain or shortness of breath    Allergies :Seafood (Unknown)  No Known Drug Allergies      PAST MEDICAL & SURGICAL HISTORY:  HTN (hypertension)    GERD (gastroesophageal reflux disease)    Mitral valve prolapse        Medications:  MEDICATIONS  (STANDING):  apixaban 10 milliGRAM(s) Oral every 12 hours  atenolol  Tablet 100 milliGRAM(s) Oral daily  atorvastatin 20 milliGRAM(s) Oral at bedtime  famotidine    Tablet 20 milliGRAM(s) Oral daily  influenza  Vaccine (HIGH DOSE) 0.7 milliLiter(s) IntraMuscular once  sertraline 150 milliGRAM(s) Oral daily    MEDICATIONS  (PRN):  acetaminophen     Tablet .. 650 milliGRAM(s) Oral every 6 hours PRN Temp greater or equal to 38C (100.4F), Mild Pain (1 - 3)  aluminum hydroxide/magnesium hydroxide/simethicone Suspension 30 milliLiter(s) Oral every 4 hours PRN Dyspepsia  melatonin 3 milliGRAM(s) Oral at bedtime PRN Insomnia  ondansetron Injectable 4 milliGRAM(s) IV Push every 8 hours PRN Nausea and/or Vomiting      Antibiotics History      Heme Medications   apixaban 10 milliGRAM(s) Oral every 12 hours, 12-07-23 @ 12:30      GI Medications  aluminum hydroxide/magnesium hydroxide/simethicone Suspension 30 milliLiter(s) Oral every 4 hours, 12-06-23 @ 13:27, Routine PRN  famotidine    Tablet 20 milliGRAM(s) Oral daily, 12-06-23 @ 13:25, Routine        LABS:                        12.7   6.36  )-----------( 198      ( 07 Dec 2023 06:30 )             38.3     12-07    141  |  106  |  16  ----------------------------<  112<H>  4.2   |  26  |  0.72    Ca    9.2      07 Dec 2023 06:30  Mg     2.2     12-06    TPro  7.2  /  Alb  3.8  /  TBili  0.4  /  DBili  x   /  AST  32  /  ALT  39  /  AlkPhos  73  12-06    DESEAN -- 12-07 @ 06:30  Anti SS-1 --  Anti SS-2 --  Anti RNP --  RF <10 12-07 @ 06:30    Atypical ANCA -- 12-07 @ 06:30  c-ANCA titer -- 12-07 @ 06:30  c-ANCA -- 12-07 @ 06:30  p-ANCA -- 12-07 @ 06:30  HIT ab -- 12-06 @ 08:05  HIT ab EIA --  D Dimer -737            Urinalysis Basic - ( 07 Dec 2023 06:30 )    Color: x / Appearance: x / SG: x / pH: x  Gluc: 112 mg/dL / Ketone: x  / Bili: x / Urobili: x   Blood: x / Protein: x / Nitrite: x   Leuk Esterase: x / RBC: x / WBC x   Sq Epi: x / Non Sq Epi: x / Bacteria: x              CULTURES: (if applicable)          CAPILLARY BLOOD GLUCOSE          RADIOLOGY  CXR:      CT:    ECHO:      VITALS:  T(C): 36.3 (12-07-23 @ 08:31), Max: 36.4 (12-07-23 @ 00:00)  T(F): 97.4 (12-07-23 @ 08:31), Max: 97.6 (12-07-23 @ 00:00)  HR: 68 (12-07-23 @ 08:31) (64 - 74)  BP: 136/69 (12-07-23 @ 08:31) (128/70 - 151/72)  BP(mean): --  ABP: --  ABP(mean): --  RR: 17 (12-07-23 @ 08:31) (15 - 17)  SpO2: 95% (12-07-23 @ 08:31) (95% - 98%)  CVP(mm Hg): --  CVP(cm H2O): --    Ins and Outs       Height (cm): 157.5 (12-06-23 @ 07:41)  Weight (kg): 68 (12-06-23 @ 07:41)  BMI (kg/m2): 27.4 (12-06-23 @ 07:41)

## 2023-12-07 NOTE — PATIENT PROFILE ADULT - FUNCTIONAL ASSESSMENT - BASIC MOBILITY 4.
Pt lives in a private house, +2 steps to enter, lives with fiance, was independent with all functional mobility and ADL performance without a device prior to admission. 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     Pt left semi-supine in bed, all lines intact, all needs in reach, bed alarm set, in NAD. JOHANA Bess aware. Heart rate 74 beats per minute. 4 = No assist / stand by assistance

## 2023-12-07 NOTE — DISCHARGE NOTE PROVIDER - PROVIDER TOKENS
PROVIDER:[TOKEN:[[id number]]],PROVIDER:[TOKEN:[[id number]]] PROVIDER:[TOKEN:[[id number]]],PROVIDER:[TOKEN:[[id number]]]

## 2023-12-07 NOTE — DISCHARGE NOTE PROVIDER - NSDCCPCAREPLAN_GEN_ALL_CORE_FT
PRINCIPAL DISCHARGE DIAGNOSIS  Diagnosis: Pulmonary embolism  Assessment and Plan of Treatment: Patient found with pulmonary emboli and bilateral DVT. Please follow up with pulmonary, PMD, and hematology. Please take eliquis as directed.      SECONDARY DISCHARGE DIAGNOSES  Diagnosis: Pulmonary emboli  Assessment and Plan of Treatment: Patient found with pulmonary emboli and DVT    Diagnosis: Pleural nodule  Assessment and Plan of Treatment: Please follow up with pulmonary as outpatient

## 2023-12-07 NOTE — DISCHARGE NOTE PROVIDER - CARE PROVIDERS DIRECT ADDRESSES
,DirectAddress_Unknown,[email or web address] ,DirectAddress_Unknown,[email or web address]

## 2023-12-07 NOTE — DISCHARGE NOTE NURSING/CASE MANAGEMENT/SOCIAL WORK - NSDCPEFALRISK_GEN_ALL_CORE
For information on Fall & Injury Prevention, visit: [email or web address] OR  [email or web address] OR  https://www.cdc.gov/steadi/patient.html For information on Fall & Injury Prevention, visit: [email or web address] OR  [email or web address] OR  https://www.cdc.gov/steadi/patient.html

## 2023-12-07 NOTE — DISCHARGE NOTE PROVIDER - NSDCMRMEDTOKEN_GEN_ALL_CORE_FT
apixaban 5 mg oral tablet: 1 tab(s) orally every 12 hours Please take 2 tablets twice a day for 7 days and then 1 tablet twice a day  atenolol 100 mg oral tablet: 1 tab(s) orally once a day  famotidine 20 mg oral tablet: 1 tab(s) orally once a day  Lipitor 20 mg oral tablet: 1 tab(s) orally once a day (at bedtime)  sertraline 150 mg oral capsule: 1 cap(s) orally once a day

## 2023-12-07 NOTE — PATIENT PROFILE ADULT - FALL HARM RISK - HARM RISK INTERVENTIONS
Communicate Risk of Fall with Harm to all staff/Reinforce activity limits and safety measures with patient and family/Tailored Fall Risk Interventions/Visual Cue: Yellow wristband and red socks/Bed in lowest position, wheels locked, appropriate side rails in place/Call bell, personal items and telephone in reach/Instruct patient to call for assistance before getting out of bed or chair/Non-slip footwear when patient is out of bed/[place] to call system/Physically safe environment - no spills, clutter or unnecessary equipment/Purposeful Proactive Rounding/Room/bathroom lighting operational, light cord in reach Communicate Risk of Fall with Harm to all staff/Reinforce activity limits and safety measures with patient and family/Tailored Fall Risk Interventions/Visual Cue: Yellow wristband and red socks/Bed in lowest position, wheels locked, appropriate side rails in place/Call bell, personal items and telephone in reach/Instruct patient to call for assistance before getting out of bed or chair/Non-slip footwear when patient is out of bed/[place] to call system/Physically safe environment - no spills, clutter or unnecessary equipment/Purposeful Proactive Rounding/Room/bathroom lighting operational, light cord in reach

## 2023-12-08 LAB
ANA TITR SER: NEGATIVE — SIGNIFICANT CHANGE UP
ANA TITR SER: NEGATIVE — SIGNIFICANT CHANGE UP
ENA JO1 AB SER-ACNC: <0.2 AI — SIGNIFICANT CHANGE UP
ENA JO1 AB SER-ACNC: <0.2 AI — SIGNIFICANT CHANGE UP

## 2023-12-09 LAB
AUTO DIFF PNL BLD: ABNORMAL
AUTO DIFF PNL BLD: ABNORMAL
C-ANCA SER-ACNC: NEGATIVE — SIGNIFICANT CHANGE UP
C-ANCA SER-ACNC: NEGATIVE — SIGNIFICANT CHANGE UP
MPO AB + PR3 PNL SER: SIGNIFICANT CHANGE UP
MPO AB + PR3 PNL SER: SIGNIFICANT CHANGE UP
P-ANCA SER-ACNC: NEGATIVE — SIGNIFICANT CHANGE UP
P-ANCA SER-ACNC: NEGATIVE — SIGNIFICANT CHANGE UP

## 2023-12-10 LAB
CCP IGG SERPL-ACNC: <8 UNITS — SIGNIFICANT CHANGE UP
CCP IGG SERPL-ACNC: <8 UNITS — SIGNIFICANT CHANGE UP
RF+CCP IGG SER-IMP: NEGATIVE — SIGNIFICANT CHANGE UP
RF+CCP IGG SER-IMP: NEGATIVE — SIGNIFICANT CHANGE UP

## 2023-12-11 PROBLEM — Z00.00 ENCOUNTER FOR PREVENTIVE HEALTH EXAMINATION: Status: ACTIVE | Noted: 2023-12-11

## 2023-12-18 ENCOUNTER — NON-APPOINTMENT (OUTPATIENT)
Age: 83
End: 2023-12-18

## 2023-12-18 ENCOUNTER — APPOINTMENT (OUTPATIENT)
Dept: FAMILY MEDICINE | Facility: CLINIC | Age: 83
End: 2023-12-18
Payer: MEDICARE

## 2023-12-18 ENCOUNTER — TRANSCRIPTION ENCOUNTER (OUTPATIENT)
Age: 83
End: 2023-12-18

## 2023-12-18 VITALS
DIASTOLIC BLOOD PRESSURE: 65 MMHG | TEMPERATURE: 97.3 F | SYSTOLIC BLOOD PRESSURE: 119 MMHG | OXYGEN SATURATION: 94 % | RESPIRATION RATE: 17 BRPM | WEIGHT: 152 LBS | HEART RATE: 62 BPM

## 2023-12-18 DIAGNOSIS — I10 ESSENTIAL (PRIMARY) HYPERTENSION: ICD-10-CM

## 2023-12-18 DIAGNOSIS — H35.30 UNSPECIFIED MACULAR DEGENERATION: ICD-10-CM

## 2023-12-18 DIAGNOSIS — R91.8 OTHER NONSPECIFIC ABNORMAL FINDING OF LUNG FIELD: ICD-10-CM

## 2023-12-18 DIAGNOSIS — F32.A ANXIETY DISORDER, UNSPECIFIED: ICD-10-CM

## 2023-12-18 DIAGNOSIS — K21.9 GASTRO-ESOPHAGEAL REFLUX DISEASE W/OUT ESOPHAGITIS: ICD-10-CM

## 2023-12-18 DIAGNOSIS — F41.9 ANXIETY DISORDER, UNSPECIFIED: ICD-10-CM

## 2023-12-18 PROCEDURE — 99204 OFFICE O/P NEW MOD 45 MIN: CPT | Mod: 25

## 2023-12-18 PROCEDURE — 90662 IIV NO PRSV INCREASED AG IM: CPT

## 2023-12-18 PROCEDURE — G0008: CPT

## 2023-12-19 PROBLEM — K21.9 GASTROESOPHAGEAL REFLUX DISEASE, UNSPECIFIED WHETHER ESOPHAGITIS PRESENT: Status: ACTIVE | Noted: 2023-12-19

## 2023-12-19 PROBLEM — R91.8 PULMONARY NODULES: Status: ACTIVE | Noted: 2023-12-19

## 2023-12-19 PROBLEM — F41.9 ANXIETY AND DEPRESSION: Status: ACTIVE | Noted: 2023-12-19

## 2023-12-19 RX ORDER — SERTRALINE HYDROCHLORIDE 50 MG/1
50 TABLET, FILM COATED ORAL
Qty: 30 | Refills: 0 | Status: ACTIVE | COMMUNITY
Start: 2023-12-19

## 2023-12-19 RX ORDER — SERTRALINE HYDROCHLORIDE 100 MG/1
100 TABLET, FILM COATED ORAL
Qty: 90 | Refills: 0 | Status: ACTIVE | COMMUNITY
Start: 2023-12-19

## 2023-12-19 NOTE — HISTORY OF PRESENT ILLNESS
[Post-hospitalization from ___ Hospital] : Post-hospitalization from [unfilled] Hospital [Discharge Summary] : discharge summary [Admitted on: ___] : The patient was admitted on [unfilled] [Discharged on ___] : discharged on [unfilled] [Pertinent Labs] : pertinent labs [Discharge Med List] : discharge medication list [Med Reconciliation] : medication reconciliation has been completed [FreeTextEntry3] : Patient presents with her daughter, Terra [FreeTextEntry2] : 83 year-old female past medical history mitral valve prolapse, hypertension, HLD, GERD, and recent hospitalization for PE/DVT presents to establish care. She was recently found to have right sided segmental PE and bilateral soleal DVT. Pt also found to have R pulmonary nodule. She was started on Eliquis while in the hospital. She was seen by cardiology who had low suspicion for ischemic cardiac event, felt that chest pain was due to underlying PE/ MSK etiology.  Patient originally from [place], moved to [place]. Daughter is here and splits time in [place].  passed away here in [facility] very recently.   Medications: sertraline 150mg total  famotidine 40mg BID Cartia 180mg q24 qd candesartan 16mg qd atorvastatin 20mg qd atenolol 100mg qd Eliquis 5mg BID qd

## 2023-12-19 NOTE — PHYSICAL EXAM
[Rt] : varicose veins of the right leg noted [Lt] : varicose veins of the left leg noted [No Joint Swelling] : no joint swelling [No Rash] : no rash [Coordination Grossly Intact] : coordination grossly intact [No Focal Deficits] : no focal deficits [Normal] : affect was normal and insight and judgment were intact

## 2023-12-20 PROBLEM — I10 ESSENTIAL (PRIMARY) HYPERTENSION: Chronic | Status: ACTIVE | Noted: 2023-12-06

## 2023-12-20 PROBLEM — K21.9 GASTRO-ESOPHAGEAL REFLUX DISEASE WITHOUT ESOPHAGITIS: Chronic | Status: ACTIVE | Noted: 2023-12-06

## 2023-12-20 PROBLEM — I34.1 NONRHEUMATIC MITRAL (VALVE) PROLAPSE: Chronic | Status: ACTIVE | Noted: 2023-12-06

## 2023-12-27 ENCOUNTER — APPOINTMENT (OUTPATIENT)
Dept: FAMILY MEDICINE | Facility: CLINIC | Age: 83
End: 2023-12-27
Payer: MEDICARE

## 2023-12-27 ENCOUNTER — NON-APPOINTMENT (OUTPATIENT)
Age: 83
End: 2023-12-27

## 2023-12-27 VITALS
RESPIRATION RATE: 15 BRPM | SYSTOLIC BLOOD PRESSURE: 126 MMHG | OXYGEN SATURATION: 96 % | DIASTOLIC BLOOD PRESSURE: 64 MMHG | HEART RATE: 72 BPM | WEIGHT: 149 LBS | TEMPERATURE: 97.7 F | HEIGHT: 61.5 IN

## 2023-12-27 VITALS — HEIGHT: 61.5 IN | BODY MASS INDEX: 27.7 KG/M2

## 2023-12-27 DIAGNOSIS — R35.0 FREQUENCY OF MICTURITION: ICD-10-CM

## 2023-12-27 PROCEDURE — 99214 OFFICE O/P EST MOD 30 MIN: CPT | Mod: 25

## 2023-12-27 PROCEDURE — 81003 URINALYSIS AUTO W/O SCOPE: CPT | Mod: QW

## 2023-12-28 LAB
BILIRUB UR QL STRIP: NEGATIVE
CLARITY UR: NORMAL
COLLECTION METHOD: NORMAL
GLUCOSE UR-MCNC: NEGATIVE
HCG UR QL: 0.2 EU/DL
HGB UR QL STRIP.AUTO: NORMAL
KETONES UR-MCNC: NEGATIVE
LEUKOCYTE ESTERASE UR QL STRIP: NORMAL
NITRITE UR QL STRIP: NEGATIVE
PH UR STRIP: 6
PROT UR STRIP-MCNC: NEGATIVE
SP GR UR STRIP: 1.01

## 2023-12-28 NOTE — HISTORY OF PRESENT ILLNESS
[FreeTextEntry8] : Lisa presents c/o urinary frequency and burning since day before [place], 12/24. No fever, had chills a few days ago but just once, none since. Now the whole family has a cold. Lost her  1 month ago.   ROS: negative except as noted above

## 2023-12-28 NOTE — PHYSICAL EXAM
[No Acute Distress] : no acute distress [Well Nourished] : well nourished [Well Developed] : well developed [Well-Appearing] : well-appearing [Normal Voice/Communication] : normal voice/communication [Normal Sclera/Conjunctiva] : normal sclera/conjunctiva [PERRL] : pupils equal round and reactive to light [Normal Outer Ear/Nose] : the outer ears and nose were normal in appearance [Normal Oropharynx] : the oropharynx was normal [Normal TMs] : both tympanic membranes were normal [Supple] : supple [No Respiratory Distress] : no respiratory distress  [No Accessory Muscle Use] : no accessory muscle use [Clear to Auscultation] : lungs were clear to auscultation bilaterally [Normal Rate] : normal rate  [Regular Rhythm] : with a regular rhythm [Normal S1, S2] : normal S1 and S2 [Speech Grossly Normal] : speech grossly normal [Memory Grossly Normal] : memory grossly normal [Normal Affect] : the affect was normal [Alert and Oriented x3] : oriented to person, place, and time [Normal Mood] : the mood was normal [Normal Insight/Judgement] : insight and judgment were intact [No CVA Tenderness] : no CVA  tenderness

## 2023-12-29 LAB — BACTERIA UR CULT: NORMAL

## 2024-01-02 ENCOUNTER — APPOINTMENT (OUTPATIENT)
Dept: CARDIOLOGY | Facility: CLINIC | Age: 84
End: 2024-01-02
Payer: MEDICARE

## 2024-01-02 ENCOUNTER — NON-APPOINTMENT (OUTPATIENT)
Age: 84
End: 2024-01-02

## 2024-01-02 VITALS
HEIGHT: 61.5 IN | OXYGEN SATURATION: 97 % | SYSTOLIC BLOOD PRESSURE: 149 MMHG | BODY MASS INDEX: 28.14 KG/M2 | WEIGHT: 151 LBS | DIASTOLIC BLOOD PRESSURE: 72 MMHG | HEART RATE: 62 BPM

## 2024-01-02 DIAGNOSIS — E78.5 HYPERLIPIDEMIA, UNSPECIFIED: ICD-10-CM

## 2024-01-02 PROCEDURE — 93000 ELECTROCARDIOGRAM COMPLETE: CPT

## 2024-01-02 PROCEDURE — 99214 OFFICE O/P EST MOD 30 MIN: CPT | Mod: 25

## 2024-01-15 NOTE — CARDIOLOGY SUMMARY
[de-identified] :  segmental pulmonary emboli in right upper lobe, right middle lobe and right lower lobe.

## 2024-01-15 NOTE — ASSESSMENT
[FreeTextEntry1] : 89 year-old female past medical history of Mitral valve prolapse, Hypertension, Hyperlipidemia, GERD present with chest pain x 2 nights. Pain occurred while resting and worsens with certain movements. States pain wraps around to her back and is associated with shortness of breath. Denies a sedentary lifestyle or recent travel. Cardiology consulted for chest pain now with  Pulmonary emboli: CTA confirmed segmental PE within right upper, middle and lower lobe. troponins negative x 2,. on apixaban. Length of therapy with respect to anticoagulation to be discussed with hematology and pulmonary service. fasting lipids requested

## 2024-01-15 NOTE — REASON FOR VISIT
[Symptom and Test Evaluation] : symptom and test evaluation [FreeTextEntry1] :  89 year-old female past medical history of Mitral valve prolapse, hypertension, hyperlipidemia, GERD presented to the ED with chest pain that started two nights ago. Patient first felt chest pain two nights ago, pain went away but felt it again this morning when she woke up. She reported left sided pressure like chest pain, wraps around lateral chest and pain is worse with movement, associated with shortness of breath.  There was no leg pain, calf pain or swelling. She is previously followed by a cardiologist in [place] who has done serial testing in the past which was reportedly negative. She is seeing Dr Moore in pulmonary evaluation. She was seen in evaluation for DVT and PE. PFT's are planned. We discussed colonoscopy in this setting.

## 2024-01-17 ENCOUNTER — OUTPATIENT (OUTPATIENT)
Dept: OUTPATIENT SERVICES | Facility: HOSPITAL | Age: 84
LOS: 1 days | Discharge: HOME | End: 2024-01-17

## 2024-01-17 DIAGNOSIS — I26.99 OTHER PULMONARY EMBOLISM WITHOUT ACUTE COR PULMONALE: ICD-10-CM

## 2024-01-18 ENCOUNTER — APPOINTMENT (OUTPATIENT)
Dept: HEMATOLOGY ONCOLOGY | Facility: CLINIC | Age: 84
End: 2024-01-18

## 2024-01-22 ENCOUNTER — APPOINTMENT (OUTPATIENT)
Dept: HEMATOLOGY ONCOLOGY | Facility: CLINIC | Age: 84
End: 2024-01-22
Payer: MEDICARE

## 2024-01-22 ENCOUNTER — NON-APPOINTMENT (OUTPATIENT)
Age: 84
End: 2024-01-22

## 2024-01-22 ENCOUNTER — RESULT REVIEW (OUTPATIENT)
Age: 84
End: 2024-01-22

## 2024-01-22 VITALS
WEIGHT: 149.67 LBS | OXYGEN SATURATION: 96 % | TEMPERATURE: 96.5 F | RESPIRATION RATE: 16 BRPM | SYSTOLIC BLOOD PRESSURE: 129 MMHG | DIASTOLIC BLOOD PRESSURE: 70 MMHG | HEART RATE: 66 BPM | BODY MASS INDEX: 27.54 KG/M2 | HEIGHT: 61.73 IN

## 2024-01-22 DIAGNOSIS — J06.9 ACUTE UPPER RESPIRATORY INFECTION, UNSPECIFIED: ICD-10-CM

## 2024-01-22 DIAGNOSIS — Z23 ENCOUNTER FOR IMMUNIZATION: ICD-10-CM

## 2024-01-22 DIAGNOSIS — Z63.4 DISAPPEARANCE AND DEATH OF FAMILY MEMBER: ICD-10-CM

## 2024-01-22 DIAGNOSIS — I26.99 OTHER PULMONARY EMBOLISM W/OUT ACUTE COR PULMONALE: ICD-10-CM

## 2024-01-22 DIAGNOSIS — Z87.891 PERSONAL HISTORY OF NICOTINE DEPENDENCE: ICD-10-CM

## 2024-01-22 DIAGNOSIS — Z87.59 PERSONAL HISTORY OF OTHER COMPLICATIONS OF PREGNANCY, CHILDBIRTH AND THE PUERPERIUM: ICD-10-CM

## 2024-01-22 LAB
ALBUMIN SERPL ELPH-MCNC: 4.5 G/DL
ALP BLD-CCNC: 75 U/L
ALT SERPL-CCNC: 22 U/L
ANION GAP SERPL CALC-SCNC: 13 MMOL/L
AST SERPL-CCNC: 33 U/L
BASOPHILS # BLD AUTO: 0.05 K/UL — SIGNIFICANT CHANGE UP (ref 0–0.2)
BASOPHILS NFR BLD AUTO: 0.8 % — SIGNIFICANT CHANGE UP (ref 0–2)
BILIRUB SERPL-MCNC: 0.4 MG/DL
BUN SERPL-MCNC: 14 MG/DL
CALCIUM SERPL-MCNC: 9.4 MG/DL
CHLORIDE SERPL-SCNC: 105 MMOL/L
CO2 SERPL-SCNC: 24 MMOL/L
CREAT SERPL-MCNC: 0.59 MG/DL
DEPRECATED D DIMER PPP IA-ACNC: 194 NG/ML DDU
EGFR: 89 ML/MIN/1.73M2
EOSINOPHIL # BLD AUTO: 0.16 K/UL — SIGNIFICANT CHANGE UP (ref 0–0.5)
EOSINOPHIL NFR BLD AUTO: 2.4 % — SIGNIFICANT CHANGE UP (ref 0–6)
GLUCOSE SERPL-MCNC: 96 MG/DL
HCT VFR BLD CALC: 37.1 % — SIGNIFICANT CHANGE UP (ref 34.5–45)
HGB BLD-MCNC: 12.2 G/DL — SIGNIFICANT CHANGE UP (ref 11.5–15.5)
IMM GRANULOCYTES NFR BLD AUTO: 0.2 % — SIGNIFICANT CHANGE UP (ref 0–0.9)
LYMPHOCYTES # BLD AUTO: 1.79 K/UL — SIGNIFICANT CHANGE UP (ref 1–3.3)
LYMPHOCYTES # BLD AUTO: 27.2 % — SIGNIFICANT CHANGE UP (ref 13–44)
MCHC RBC-ENTMCNC: 30.7 PG — SIGNIFICANT CHANGE UP (ref 27–34)
MCHC RBC-ENTMCNC: 32.9 G/DL — SIGNIFICANT CHANGE UP (ref 32–36)
MCV RBC AUTO: 93.2 FL — SIGNIFICANT CHANGE UP (ref 80–100)
MONOCYTES # BLD AUTO: 0.61 K/UL — SIGNIFICANT CHANGE UP (ref 0–0.9)
MONOCYTES NFR BLD AUTO: 9.3 % — SIGNIFICANT CHANGE UP (ref 2–14)
NEUTROPHILS # BLD AUTO: 3.97 K/UL — SIGNIFICANT CHANGE UP (ref 1.8–7.4)
NEUTROPHILS NFR BLD AUTO: 60.1 % — SIGNIFICANT CHANGE UP (ref 43–77)
NRBC # BLD: 0 /100 WBCS — SIGNIFICANT CHANGE UP (ref 0–0)
PLATELET # BLD AUTO: 233 K/UL — SIGNIFICANT CHANGE UP (ref 150–400)
POTASSIUM SERPL-SCNC: 4.2 MMOL/L
PROT SERPL-MCNC: 6.7 G/DL
RBC # BLD: 3.98 M/UL — SIGNIFICANT CHANGE UP (ref 3.8–5.2)
RBC # FLD: 12.4 % — SIGNIFICANT CHANGE UP (ref 10.3–14.5)
SODIUM SERPL-SCNC: 142 MMOL/L
WBC # BLD: 6.59 K/UL — SIGNIFICANT CHANGE UP (ref 3.8–10.5)
WBC # FLD AUTO: 6.59 K/UL — SIGNIFICANT CHANGE UP (ref 3.8–10.5)

## 2024-01-22 PROCEDURE — 99205 OFFICE O/P NEW HI 60 MIN: CPT

## 2024-01-22 RX ORDER — CANDESARTAN CILEXETIL 16 MG/1
16 TABLET ORAL DAILY
Refills: 0 | Status: ACTIVE | COMMUNITY
Start: 2023-12-19

## 2024-01-22 RX ORDER — ATORVASTATIN CALCIUM 20 MG/1
20 TABLET, FILM COATED ORAL
Refills: 0 | Status: ACTIVE | COMMUNITY
Start: 2023-12-19

## 2024-01-22 RX ORDER — ATENOLOL 100 MG/1
100 TABLET ORAL DAILY
Refills: 0 | Status: ACTIVE | COMMUNITY
Start: 2023-12-19

## 2024-01-22 RX ORDER — NITROFURANTOIN MACROCRYSTALS 100 MG/1
100 CAPSULE ORAL
Qty: 10 | Refills: 0 | Status: DISCONTINUED | COMMUNITY
Start: 2023-12-27 | End: 2024-01-22

## 2024-01-22 RX ORDER — COLD-HOT PACK
125 MCG EACH MISCELLANEOUS DAILY
Refills: 0 | Status: ACTIVE | COMMUNITY
Start: 2024-01-22

## 2024-01-22 RX ORDER — DILTIAZEM HYDROCHLORIDE 180 MG/1
180 CAPSULE, EXTENDED RELEASE ORAL DAILY
Refills: 0 | Status: ACTIVE | COMMUNITY
Start: 2023-12-19

## 2024-01-22 RX ORDER — FAMOTIDINE 40 MG/1
40 TABLET, FILM COATED ORAL DAILY
Refills: 0 | Status: ACTIVE | COMMUNITY
Start: 2023-12-19

## 2024-01-22 SDOH — SOCIAL STABILITY - SOCIAL INSECURITY: DISSAPEARANCE AND DEATH OF FAMILY MEMBER: Z63.4

## 2024-01-22 NOTE — ASSESSMENT
[FreeTextEntry1] : JOSÉ MIGUEL KAPLAN is a 84 year woman with PMH of MVP, HTN, HLD, and GERD, who presents for Hematology evaluation of DVT and PE.  # DVT/PE: She was diagnosed on 12/6/23 with segmental PE within the right upper, middle, and lower lobe, as well as bilateral soleal DVT. Thrombophilia work up was not performed inpatient. She was started on therapeutic lovenox and transitioned to Eliquis on discharge. No prior personal history of venous or arterial thromboembolism. No clear trigger for her clot. Suspect this may be unprovoked, but we will perform a thrombophilia work up to confirm. - CBC and CMP - D-dimer - Factor V Leiden mutation - Prothrombin gene mutation - Antiphospholipid antibodies: cardiolipin IgG/IgM, B2-glycoprotein IgG/IgM, lupus anticoagulant (silica clotting time and DRVVT) - Continue Eliquis 5 mg BID. Will plan to continue at therapeutic dose for at least 6 months, after which we will likely continue at prophylactic dose. - Counseled patient on need for age-appropriate cancer screening. She will reach out to her primary doctor to arrange for repeat colon cancer screening. - Follow up with Pulm (Dr. Wing Luna) for repeat CT chest to monitor pulmonary nodules - Patient will have a repeat visit in 6/2024 (6 month follow up). Will do home lab draw and video visit. normal.

## 2024-01-22 NOTE — HISTORY OF PRESENT ILLNESS
[de-identified] : JOSÉ MIGUEL KAPLAN is a 84 year woman with PMH of MVP, HTN, HLD, and GERD, who presents for Hematology evaluation of DVT and PE.  She initially presented to [facility] on 12/6/23 with chest pressure for 2 days. Her vital signs were within normal limits. Her labs were notable for an elevated D-dimer (737). CT-A revealed segmental PE within the right upper, middle, and lower lobe, as well as pleural-based nodules (RUL - 6 mm, RLL - 1.0 cm). Pulm was consulted and recommended repeat CT chest in 4-6 weeks. Lower extremity Dopplers revealed bilateral soleal DVT. Thrombophilia work up was not performed inpatient. She was started on therapeutic lovenox and transitioned to Eliquis on discharge.  She presents today to establish outpatient care. She remains on Eliquis 5 mg BID without any bleeding complications. No prior personal history of venous or arterial thromboembolism. 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 She is UTD with breast cancer screening. She had a Cologuard stool test for colon cancer screening 3 years ago that was normal.    Family History: - Sister: MI and aortic aneurysm - No history of venous thromboembolism   Social History: - Recently  - Lives alone. Has 3 children. - Prior remote tobacco use

## 2024-01-22 NOTE — REASON FOR VISIT
[Initial Consultation] : an initial consultation for [Coagulopathy] : coagulopathy [Family Member] : family member

## 2024-01-23 LAB
CARDIOLIPIN IGM SER-MCNC: 9.1 MPL
CARDIOLIPIN IGM SER-MCNC: <5 GPL
CONFIRM: 70.7 SEC
DRVVT 1H NP PPP: 42.8 SEC
DRVVT IMM 1:2 NP PPP: NORMAL
DRVVT SCREEN TO CONFIRM RATIO: 0.62 RATIO
SCREEN DRVVT: 51.5 SEC

## 2024-01-24 ENCOUNTER — NON-APPOINTMENT (OUTPATIENT)
Age: 84
End: 2024-01-24

## 2024-01-24 LAB
DNA PLOIDY SPEC FC-IMP: NORMAL
PTR INTERP: NORMAL

## 2024-01-25 LAB
B2 GLYCOPROT1 IGG SER-ACNC: <5 SGU
B2 GLYCOPROT1 IGM SER-ACNC: <5 SMU

## 2024-02-02 ENCOUNTER — EMERGENCY (EMERGENCY)
Facility: HOSPITAL | Age: 84
LOS: 1 days | Discharge: HOME | End: 2024-02-02
Attending: STUDENT IN AN ORGANIZED HEALTH CARE EDUCATION/TRAINING PROGRAM | Admitting: STUDENT IN AN ORGANIZED HEALTH CARE EDUCATION/TRAINING PROGRAM
Payer: MEDICARE

## 2024-02-02 VITALS
WEIGHT: 149.91 LBS | HEART RATE: 61 BPM | RESPIRATION RATE: 18 BRPM | DIASTOLIC BLOOD PRESSURE: 60 MMHG | HEIGHT: 62 IN | OXYGEN SATURATION: 98 % | SYSTOLIC BLOOD PRESSURE: 120 MMHG | TEMPERATURE: 98 F

## 2024-02-02 VITALS
DIASTOLIC BLOOD PRESSURE: 75 MMHG | SYSTOLIC BLOOD PRESSURE: 123 MMHG | HEART RATE: 66 BPM | RESPIRATION RATE: 17 BRPM | OXYGEN SATURATION: 95 %

## 2024-02-02 PROCEDURE — 99284 EMERGENCY DEPT VISIT MOD MDM: CPT

## 2024-02-02 PROCEDURE — 73562 X-RAY EXAM OF KNEE 3: CPT

## 2024-02-02 PROCEDURE — 73562 X-RAY EXAM OF KNEE 3: CPT | Mod: 26,LT

## 2024-02-02 PROCEDURE — 93971 EXTREMITY STUDY: CPT

## 2024-02-02 PROCEDURE — 99284 EMERGENCY DEPT VISIT MOD MDM: CPT | Mod: 25

## 2024-02-02 PROCEDURE — 93971 EXTREMITY STUDY: CPT | Mod: 26,LT

## 2024-02-02 NOTE — ED PROVIDER NOTE - PATIENT PORTAL LINK FT
You can access the FollowMyHealth Patient Portal offered by [facility] by registering at the following website: [email or web address]. By joining [facility]’s FollowMyHealth portal, you will also be able to view your health information using other applications (apps) compatible with our system.

## 2024-02-02 NOTE — ED PROVIDER NOTE - CLINICAL SUMMARY MEDICAL DECISION MAKING FREE TEXT BOX
Dr. Remington Delgado MD, EM And Medical Toxicology Attendinf pmhx of DVT/PE on eliquis, htn, hld, presenting with left knee pain s/p mechanical fall 7 days ago. Had a mechanical trip and fall, direct on left knee, and now with posterior knee pain achy positional. Ambulatory but with mild limp. Denies any chest pain, abdominal pain, shortness of breath, nausea/vomiting, headaches, fevers, chills, constipation, weakness,   dysuria, urinary symptoms, subjective neurological deficits.   History obtained from independent historian: na  External note reviewed: na  DDx: rule out dvt, msk, contusion  Decision making, ED course, independent interpretation of imaging studies, and consults:   XR knee and DVT study.      Consideration hospitalization vs de-escalation of care: XXX    XXX I, Dr. Remington Delgado MD discussed the case with XXXX attending XXXX who advises me XXXX    Disposition: XXX Dr. Remington Delgado MD, EM And Medical Toxicology Attendinf pmhx of DVT/PE on eliquis, htn, hld, presenting with left knee pain s/p mechanical fall 7 days ago. Had a mechanical trip and fall, direct on left knee, and now with posterior knee pain achy positional. Ambulatory but with mild limp. Denies any chest pain, abdominal pain, shortness of breath, nausea/vomiting, headaches, fevers, chills, constipation, weakness,   dysuria, urinary symptoms, subjective neurological deficits.   History obtained from independent historian: na  External note reviewed: na  DDx: rule out dvt, msk, contusion  Decision making, ED course, independent interpretation of imaging studies, and consults:   XR knee and DVT study.    1017pm negative studies, dc home  Consideration hospitalization vs de-escalation of care: dc  Disposition: dc

## 2024-02-02 NOTE — ED PROVIDER NOTE - PHYSICAL EXAMINATION
Patient expressed no known problems or needs
VITAL SIGNS: I have reviewed nursing notes and confirm.   GEN: Well-developed; well-nourished; in no acute distress. Speaking full sentences.  SKIN: Warm, pink, no rash, no diaphoresis, no cyanosis, well perfused.   HEAD: Normocephalic; atraumatic. No scalp lacerations, no abrasions.  NECK: Supple; non tender.   EYES: Pupils 3mm equal, round, reactive to light and accomodation, conjunctiva and sclera clear.    ENT: No nasal discharge; airway clear. Trachea is midline. Normal dentition.  CV: RRR. S1, S2 normal; no murmurs, gallops, or rubs. Capillary refill < 2 seconds throughout. Distal pulses intact 2+ throughout.  RESP: CTA bilaterally. No wheezes, rales, or rhonchi.   ABD: Normal bowel sounds, soft, non-distended, non-tender, no rebound, no guarding, no rigidity   MSK: Normal range of motion and movement of all 4 extremities.    BACK: No thoracolumbar midline or paravertebral tenderness.    NEURO: Alert & oriented x 3,  Gait: Fluid. Normal speech and coordination.

## 2024-02-02 NOTE — ED PROVIDER NOTE - OBJECTIVE STATEMENT
84f pmhx of DVT/PE on eliquis, htn, hld, presenting with left knee pain s/p mechanical fall 7 days ago. Had a mechanical trip and fall, direct on left knee, 84f pmhx of DVT/PE on eliquis, htn, hld, presenting with left knee pain s/p mechanical fall 7 days ago. Had a mechanical trip and fall, direct on left knee, and now with posterior knee pain achy positional. Ambulatory but with mild limp. Denies any chest pain, abdominal pain, shortness of breath, nausea/vomiting, headaches, fevers, chills, constipation, weakness,   dysuria, urinary symptoms, subjective neurological deficits.

## 2024-02-02 NOTE — ED ADULT NURSE NOTE - NSFALLHARMRISKINTERV_ED_ALL_ED

## 2024-02-02 NOTE — ED ADULT NURSE NOTE - OBJECTIVE STATEMENT
pt presents to ED c/o LLE pain for 5-6 days. pt reports knee trauma last week. hx DVT to B/L LE and PE. pt on eliquis and compliant. denies chest pain or sob. pt on arrival with swelling L>R LE

## 2024-02-12 ENCOUNTER — APPOINTMENT (OUTPATIENT)
Dept: FAMILY MEDICINE | Facility: CLINIC | Age: 84
End: 2024-02-12

## 2024-04-10 PROBLEM — R30.0 DYSURIA: Status: ACTIVE | Noted: 2024-04-10

## 2024-04-11 ENCOUNTER — APPOINTMENT (OUTPATIENT)
Dept: FAMILY MEDICINE | Facility: CLINIC | Age: 84
End: 2024-04-11
Payer: MEDICARE

## 2024-04-11 VITALS
HEART RATE: 65 BPM | WEIGHT: 146 LBS | OXYGEN SATURATION: 97 % | BODY MASS INDEX: 26.94 KG/M2 | TEMPERATURE: 98.1 F | RESPIRATION RATE: 14 BRPM | SYSTOLIC BLOOD PRESSURE: 127 MMHG | DIASTOLIC BLOOD PRESSURE: 73 MMHG

## 2024-04-11 DIAGNOSIS — N30.00 ACUTE CYSTITIS W/OUT HEMATURIA: ICD-10-CM

## 2024-04-11 DIAGNOSIS — R30.0 DYSURIA: ICD-10-CM

## 2024-04-11 LAB
APPEARANCE: CLEAR
BACTERIA: ABNORMAL /HPF
BILIRUBIN URINE: NEGATIVE
BLOOD URINE: NEGATIVE
CAST: 0 /LPF
COLOR: YELLOW
EPITHELIAL CELLS: 3 /HPF
GLUCOSE QUALITATIVE U: NEGATIVE MG/DL
KETONES URINE: NEGATIVE MG/DL
LEUKOCYTE ESTERASE URINE: ABNORMAL
MICROSCOPIC-UA: NORMAL
NITRITE URINE: POSITIVE
PH URINE: 6.5
PROTEIN URINE: NEGATIVE MG/DL
RED BLOOD CELLS URINE: 3 /HPF
SPECIFIC GRAVITY URINE: 1.02
UROBILINOGEN URINE: 1 MG/DL
WHITE BLOOD CELLS URINE: 4 /HPF

## 2024-04-11 PROCEDURE — 99213 OFFICE O/P EST LOW 20 MIN: CPT

## 2024-04-11 PROCEDURE — G2211 COMPLEX E/M VISIT ADD ON: CPT

## 2024-04-11 RX ORDER — SULFAMETHOXAZOLE AND TRIMETHOPRIM 800; 160 MG/1; MG/1
800-160 TABLET ORAL
Qty: 6 | Refills: 0 | Status: COMPLETED | COMMUNITY
Start: 2024-04-11 | End: 2024-04-14

## 2024-04-11 NOTE — HISTORY OF PRESENT ILLNESS
[FreeTextEntry8] :  83 year-old female past medical history mitral valve prolapse, hypertension, HLD, GERD, and PE/DVT on eliquis presents with concern for UTI. She reports a sudden onset of urinary incontinence and a burning sensation for the past three to four days.  In context of the associated symptoms, the patient also expressed a feeling of being unwell and an inability to sleep well for the past few nights. Has had a UTI in the past but not recently.

## 2024-04-11 NOTE — REVIEW OF SYSTEMS
[Dysuria] : dysuria [Incontinence] : incontinence [Frequency] : frequency [Negative] : Respiratory [FreeTextEntry8] : as above

## 2024-04-15 LAB — BACTERIA UR CULT: ABNORMAL

## 2024-04-23 ENCOUNTER — OUTPATIENT (OUTPATIENT)
Dept: OUTPATIENT SERVICES | Facility: HOSPITAL | Age: 84
LOS: 1 days | End: 2024-04-23
Payer: MEDICARE

## 2024-04-23 ENCOUNTER — APPOINTMENT (OUTPATIENT)
Dept: CT IMAGING | Facility: HOSPITAL | Age: 84
End: 2024-04-23
Payer: MEDICARE

## 2024-04-23 DIAGNOSIS — Z00.8 ENCOUNTER FOR OTHER GENERAL EXAMINATION: ICD-10-CM

## 2024-04-23 PROCEDURE — 71250 CT THORAX DX C-: CPT | Mod: 26

## 2024-04-23 PROCEDURE — 71250 CT THORAX DX C-: CPT

## 2024-05-03 ENCOUNTER — NON-APPOINTMENT (OUTPATIENT)
Age: 84
End: 2024-05-03

## 2024-05-07 ENCOUNTER — NON-APPOINTMENT (OUTPATIENT)
Age: 84
End: 2024-05-07

## 2024-05-09 ENCOUNTER — APPOINTMENT (OUTPATIENT)
Dept: OTOLARYNGOLOGY | Facility: CLINIC | Age: 84
End: 2024-05-09
Payer: MEDICARE

## 2024-05-09 PROCEDURE — 92610 EVALUATE SWALLOWING FUNCTION: CPT | Mod: GN

## 2024-05-09 NOTE — ASSESSMENT
[FreeTextEntry1] : CLINICAL DYSPHAGIA EVALUATION   Date of Report: 2024  Date of Evaluation: 2024  Patient Name:  Lisa Philip  :   1940  Primary Diagnosis:  Dysphagia  Treatment Diagnosis:  Dysphagia  Referring Physician:   Dr. Clemons   REASON FOR REFERRAL  Lisa Philip is an 84 year old female who seen today for a comprehensive Clinical Dysphagia Evaluation at [facility] upon the referral of her physician, Dr. Clemons, to rule out aspiration, assess for diet texture change as appropriate, explore positional strategies, and/or compensatory techniques as necessary.  The physician ordered this evaluation to ensure that the patient meets her nutrition/hydration needs by mouth without compromising respiratory status.      HISTORY OF PRESENTING ILLNESS: Ms. Philip arrived to today's evaluation unaccompanied and was a reliable informant. The patient was received alert, pleasant and in no apparent distress. Per charting and patient report, the patient's PMHx is significant for mitral valve prolapse, hypertension, HLD, GERD and was recently hospitalized at [facility] in 2023 found to have PE/DVT (on Eliquis) and pulmonary nodules. The patient reports swallowing difficulty noted since that time marked by intermittent coughing during meals and feelings of pharyngeal stasis with dry solids, which clears with a volitional additional swallow. The patient denies feelings of shortness of breath during or following meals, odynophagia, any recent pneumonia, or any unintentional weight loss.     CURRENT NUTRITIONAL INTAKE: regular solids and thin liquids, per patient report    MEDICAL HISTORY, per EMR:  Active Problems  Anxiety and depression (300.00,311) (F41.9,F32.A)  Benign essential hypertension (401.1) (I10)  Gastroesophageal reflux disease, unspecified whether esophagitis present (530.81)  (K21.9)  Hyperlipidemia (272.4) (E78.5)  Macular degeneration (362.50) (H35.30)  Pulmonary embolism (415.19) (I26.99)  Pulmonary nodules (793.19) (R91.8)  Urinary frequency (788.41) (R35.0)       Medications were reviewed and can be located in patient's medical chart.   Allergies were reviewed and can be located in patient's medical chart.     CLINICAL FINDINGS   Oral Peripheral Assessment:  Structures:    WFL  Symmetry:    WFL                      Dentition:   Complete  Soft Palate:   WFL   Secretions: The patient managed her secretions adequately throughout today's assessment.   Cognition/Communication: WFL  Voice: WFL    Functions: Assessment of the labio-lingual and mandibular musculature revealed adequate labial and lingual strength and ROM and adequate mandibular strength.   Volitional Swallow: Upon clinician verbal instruction, the patient demonstrated timely pharyngeal swallow trigger and complete hyolaryngeal excursion.     Consistencies Administered:    Solids: Pureed, regular solids Liquids: Mildly thick and Thin liquids via single and consecutive cup sips    Oral Stage:  The patient demonstrated a functional oral phase for pureed, regular solids, mildly thick, and thin liquids marked by adequate oral acceptance and timely collection and transport with adequate clearance post swallow. Piecemeal deglutition noted with pureed and solids.    Pharyngeal Stage:  The patient demonstrated a judged functional pharyngeal phase for pureed, solids, mildly thick, and thin liquids marked by suspected timely pharyngeal swallow trigger and hyolaryngeal elevation noted by digital palpation without evidence of airway penetration/aspiration. Of note, the patient denied feelings of pharyngeal stasis across all PO trials.      IMPRESSIONS:  swallow function is grossly within functional limits    PROGNOSIS: Good for recommendations    RECOMMENDATIONS:   1. Recommend diet level of regular and thin liquids    2. Recommend safe swallowing guidelines of slow pacing, single/small bites/sips, alternate solids with liquids, and maintain upright positioning (no <90 degrees) during meals and after meals for at least 1-2 hours while maintaining reflux precautions.   3. Recommend objective swallow study of a Modified Barium Swallow Study (MBSS) to further assess swallow mechanism given reports of intermittent coughing and feelings of pharyngeal stasis. Swallowing therapy to be determined based on results and recommendations of MBSS.   4. Follow up with referring physician, as directed   EDUCATION: Education provided to the patient in regard to recommended diet level and optimal eating strategies (i.e. small bites/sips and alternating solids with liquids at decreased rate of consumption) at the end of the evaluation.  Written and verbal educational information provided in regard to scheduling a Modified Barium Swallow Study.     Should you have any additional concerns, please contact the Center at [phone]   Sia Mansfield M.A. CCC-SLP  Speech Language Pathologist  [facility]

## 2024-05-30 ENCOUNTER — OUTPATIENT (OUTPATIENT)
Dept: OUTPATIENT SERVICES | Facility: HOSPITAL | Age: 84
LOS: 1 days | Discharge: HOME | End: 2024-05-30

## 2024-05-30 DIAGNOSIS — I26.99 OTHER PULMONARY EMBOLISM WITHOUT ACUTE COR PULMONALE: ICD-10-CM

## 2024-06-01 ENCOUNTER — NON-APPOINTMENT (OUTPATIENT)
Age: 84
End: 2024-06-01

## 2024-06-10 ENCOUNTER — LABORATORY RESULT (OUTPATIENT)
Age: 84
End: 2024-06-10

## 2024-06-10 LAB
ALBUMIN SERPL ELPH-MCNC: 4.6 G/DL
ALP BLD-CCNC: 84 U/L
ALT SERPL-CCNC: 19 U/L
ANION GAP SERPL CALC-SCNC: 15 MMOL/L
AST SERPL-CCNC: 28 U/L
BILIRUB SERPL-MCNC: 0.4 MG/DL
BUN SERPL-MCNC: 14 MG/DL
CALCIUM SERPL-MCNC: 9.5 MG/DL
CHLORIDE SERPL-SCNC: 104 MMOL/L
CO2 SERPL-SCNC: 23 MMOL/L
CREAT SERPL-MCNC: 0.58 MG/DL
EGFR: 90 ML/MIN/1.73M2
GLUCOSE SERPL-MCNC: 91 MG/DL
POTASSIUM SERPL-SCNC: 4.6 MMOL/L
PROT SERPL-MCNC: 7 G/DL
SODIUM SERPL-SCNC: 142 MMOL/L

## 2024-06-11 LAB — DEPRECATED D DIMER PPP IA-ACNC: <150 NG/ML DDU

## 2024-06-12 ENCOUNTER — APPOINTMENT (OUTPATIENT)
Dept: FAMILY MEDICINE | Facility: CLINIC | Age: 84
End: 2024-06-12
Payer: MEDICARE

## 2024-06-12 VITALS
TEMPERATURE: 97.4 F | HEART RATE: 71 BPM | RESPIRATION RATE: 15 BRPM | OXYGEN SATURATION: 95 % | SYSTOLIC BLOOD PRESSURE: 128 MMHG | DIASTOLIC BLOOD PRESSURE: 71 MMHG

## 2024-06-12 DIAGNOSIS — R05.8 OTHER SPECIFIED COUGH: ICD-10-CM

## 2024-06-12 DIAGNOSIS — H65.90 UNSPECIFIED NONSUPPURATIVE OTITIS MEDIA, UNSPECIFIED EAR: ICD-10-CM

## 2024-06-12 PROCEDURE — 99214 OFFICE O/P EST MOD 30 MIN: CPT

## 2024-06-12 PROCEDURE — G2211 COMPLEX E/M VISIT ADD ON: CPT

## 2024-06-12 RX ORDER — IPRATROPIUM BROMIDE 42 UG/1
0.06 SPRAY NASAL
Qty: 1 | Refills: 1 | Status: ACTIVE | COMMUNITY
Start: 2024-06-12 | End: 1900-01-01

## 2024-06-12 RX ORDER — BENZONATATE 200 MG/1
200 CAPSULE ORAL
Qty: 30 | Refills: 0 | Status: ACTIVE | COMMUNITY
Start: 2024-06-12 | End: 1900-01-01

## 2024-06-12 NOTE — PHYSICAL EXAM
[Coordination Grossly Intact] : coordination grossly intact [No Focal Deficits] : no focal deficits [Normal] : affect was normal and insight and judgment were intact [de-identified] : middle ear effusion (right)

## 2024-06-12 NOTE — REVIEW OF SYSTEMS
[Earache] : earache [Nasal Discharge] : nasal discharge [Sore Throat] : no sore throat [Postnasal Drip] : postnasal drip [Cough] : cough [Negative] : Cardiovascular

## 2024-06-12 NOTE — HISTORY OF PRESENT ILLNESS
[FreeTextEntry8] : 84 year-old female past medical history mitral valve prolapse, hypertension, HLD, GERD, and PE/DVT on eliquis presents with concern of cough. She endorses persistent cough for the past 12 days. She visited Urgent Care around June 2, and was informed by the physician that her lungs were clear. She was given a cough medication and had a fever of 103 degrees on the first day of her illness. Now, she does not have a fever but reports persistent cough and occasional ear pain. The patient has been experiencing difficulty sleeping due to her symptoms. Phlegm coloration has fluctuated from yellow to green, though nasal discharge is clear. On some nights, the patient experiences significant coughing, She also reported getting tired more easily since her illness started. The patient's son and his wife had a similar illness recently. The patient speculates that she may have caught the illness from them.  Medications:   - The patient was given a cough medication during her Urgent Care visit. She has also been taking Allegra to help manage her symptoms.

## 2024-06-13 ENCOUNTER — APPOINTMENT (OUTPATIENT)
Dept: HEMATOLOGY ONCOLOGY | Facility: CLINIC | Age: 84
End: 2024-06-13
Payer: MEDICARE

## 2024-06-13 DIAGNOSIS — I26.99 OTHER PULMONARY EMBOLISM W/OUT ACUTE COR PULMONALE: ICD-10-CM

## 2024-06-13 PROCEDURE — 99213 OFFICE O/P EST LOW 20 MIN: CPT

## 2024-06-13 RX ORDER — APIXABAN 2.5 MG/1
2.5 TABLET, FILM COATED ORAL
Qty: 180 | Refills: 3 | Status: ACTIVE | COMMUNITY
Start: 2023-12-19 | End: 1900-01-01

## 2024-06-13 NOTE — HISTORY OF PRESENT ILLNESS
[de-identified] : JOSÉ MIGUEL KAPLAN is a 84 year old woman with PMH of MVP, HTN, HLD, and GERD, who presents for Hematology evaluation of DVT and PE.  She initially presented to [facility] on 12/6/23 with chest pressure for 2 days. Her vital signs were within normal limits. Her labs were notable for an elevated D-dimer (737). CT-A revealed segmental PE within the right upper, middle, and lower lobe, as well as pleural-based nodules (RUL - 6 mm, RLL - 1.0 cm). Pulm was consulted and recommended repeat CT chest in 4-6 weeks. Lower extremity Dopplers revealed bilateral soleal DVT. Thrombophilia work up was not performed inpatient. She was started on therapeutic lovenox and transitioned to Eliquis on discharge.  No prior personal history of venous or arterial thromboembolism. 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 She is UTD with breast cancer screening. She had a Cologuard stool test for colon cancer screening 3 years ago that was normal. Thrombophilia work up was negative.  Interval History: - She returns today for a follow up telehealth. Her labs from 6/10/24 showed a normal CBC, CMP, and D-dimer. She remains on Eliquis 5 mg BID without any bleeding complications. She had a follow up CT chest with her pulmonologist and is scheduled for a repeat in 10/2024.   Family History: - Sister: MI and aortic aneurysm - No history of venous thromboembolism   Social History: - Recently  - Lives alone. Has 3 children. - Prior remote tobacco use

## 2024-06-13 NOTE — ASSESSMENT
[FreeTextEntry1] : JOSÉ MIGUEL KAPLAN is a 84 year old woman with PMH of MVP, HTN, HLD, and GERD, who presents for Hematology evaluation of DVT and PE.  # DVT/PE: She was diagnosed on 12/6/23 with segmental PE within the right upper, middle, and lower lobe, as well as bilateral soleal DVT. Thrombophilia work up was not performed inpatient. She was started on therapeutic lovenox and transitioned to Eliquis on discharge. No prior personal history of venous or arterial thromboembolism. Thrombophilia work up was negative. Suspected to be unprovoked. - Decrease Eliquis to 2.5 mg BID. Will plan to continue indefinitely as long as she is tolerating from a bleeding perspective.  - Counseled patient on age-appropriate cancer screening. - Follow up with Pulm (Dr. Wing Luna) for repeat CT chest to monitor pulmonary nodules - Patient will have a repeat televisit in 6 months

## 2024-07-05 ENCOUNTER — APPOINTMENT (OUTPATIENT)
Dept: CARDIOLOGY | Facility: CLINIC | Age: 84
End: 2024-07-05
Payer: MEDICARE

## 2024-07-05 ENCOUNTER — NON-APPOINTMENT (OUTPATIENT)
Age: 84
End: 2024-07-05

## 2024-07-05 VITALS
HEIGHT: 61.73 IN | WEIGHT: 47 LBS | BODY MASS INDEX: 8.65 KG/M2 | DIASTOLIC BLOOD PRESSURE: 76 MMHG | HEART RATE: 83 BPM | OXYGEN SATURATION: 98 % | SYSTOLIC BLOOD PRESSURE: 116 MMHG

## 2024-07-05 PROCEDURE — 93000 ELECTROCARDIOGRAM COMPLETE: CPT

## 2024-07-05 PROCEDURE — 99212 OFFICE O/P EST SF 10 MIN: CPT | Mod: 25

## 2024-08-16 ENCOUNTER — NON-APPOINTMENT (OUTPATIENT)
Age: 84
End: 2024-08-16

## 2024-09-10 ENCOUNTER — APPOINTMENT (OUTPATIENT)
Dept: FAMILY MEDICINE | Facility: CLINIC | Age: 84
End: 2024-09-10
Payer: MEDICARE

## 2024-09-10 VITALS
BODY MASS INDEX: 27.56 KG/M2 | HEIGHT: 61 IN | RESPIRATION RATE: 15 BRPM | DIASTOLIC BLOOD PRESSURE: 74 MMHG | HEART RATE: 79 BPM | OXYGEN SATURATION: 95 % | TEMPERATURE: 97.5 F | SYSTOLIC BLOOD PRESSURE: 129 MMHG | WEIGHT: 146 LBS

## 2024-09-10 VITALS
DIASTOLIC BLOOD PRESSURE: 74 MMHG | WEIGHT: 126 LBS | OXYGEN SATURATION: 95 % | HEIGHT: 61 IN | RESPIRATION RATE: 15 BRPM | HEART RATE: 79 BPM | BODY MASS INDEX: 23.79 KG/M2 | SYSTOLIC BLOOD PRESSURE: 129 MMHG

## 2024-09-10 VITALS — BODY MASS INDEX: 27.59 KG/M2 | WEIGHT: 146 LBS

## 2024-09-10 DIAGNOSIS — I83.90 ASYMPTOMATIC VARICOSE VEINS OF UNSPECIFIED LOWER EXTREMITY: ICD-10-CM

## 2024-09-10 DIAGNOSIS — E78.5 HYPERLIPIDEMIA, UNSPECIFIED: ICD-10-CM

## 2024-09-10 DIAGNOSIS — I10 ESSENTIAL (PRIMARY) HYPERTENSION: ICD-10-CM

## 2024-09-10 DIAGNOSIS — N30.00 ACUTE CYSTITIS W/OUT HEMATURIA: ICD-10-CM

## 2024-09-10 DIAGNOSIS — K21.9 GASTRO-ESOPHAGEAL REFLUX DISEASE W/OUT ESOPHAGITIS: ICD-10-CM

## 2024-09-10 DIAGNOSIS — R25.2 CRAMP AND SPASM: ICD-10-CM

## 2024-09-10 DIAGNOSIS — Z87.898 PERSONAL HISTORY OF OTHER SPECIFIED CONDITIONS: ICD-10-CM

## 2024-09-10 DIAGNOSIS — H65.90 UNSPECIFIED NONSUPPURATIVE OTITIS MEDIA, UNSPECIFIED EAR: ICD-10-CM

## 2024-09-10 DIAGNOSIS — R05.8 OTHER SPECIFIED COUGH: ICD-10-CM

## 2024-09-10 PROCEDURE — G2211 COMPLEX E/M VISIT ADD ON: CPT

## 2024-09-10 PROCEDURE — 99214 OFFICE O/P EST MOD 30 MIN: CPT

## 2024-09-11 PROBLEM — Z87.898 HISTORY OF URINARY FREQUENCY: Status: RESOLVED | Noted: 2023-12-27 | Resolved: 2024-09-11

## 2024-09-11 PROBLEM — R25.2 BILATERAL LEG CRAMPS: Status: ACTIVE | Noted: 2024-09-11

## 2024-09-11 PROBLEM — N30.00 ACUTE CYSTITIS WITHOUT HEMATURIA: Status: RESOLVED | Noted: 2024-04-11 | Resolved: 2024-09-11

## 2024-09-11 PROBLEM — H65.90 MIDDLE EAR EFFUSION: Status: RESOLVED | Noted: 2024-06-12 | Resolved: 2024-09-11

## 2024-09-11 PROBLEM — R05.8 POST-VIRAL COUGH SYNDROME: Status: RESOLVED | Noted: 2024-06-12 | Resolved: 2024-09-11

## 2024-09-11 NOTE — PHYSICAL EXAM
[No Pitting Edema] : no pitting edema present [Rt] : varicose veins of the right leg noted [Lt] : varicose veins of the left leg noted [Coordination Grossly Intact] : coordination grossly intact [No Focal Deficits] : no focal deficits [Normal] : affect was normal and insight and judgment were intact [de-identified] : calfs non tender to palpation, no swelling or erythema

## 2024-09-11 NOTE — PHYSICAL EXAM
[No Pitting Edema] : no pitting edema present [Rt] : varicose veins of the right leg noted [Lt] : varicose veins of the left leg noted [Coordination Grossly Intact] : coordination grossly intact [No Focal Deficits] : no focal deficits [Normal] : affect was normal and insight and judgment were intact [de-identified] : calfs non tender to palpation, no swelling or erythema

## 2024-09-11 NOTE — ASSESSMENT
[FreeTextEntry1] : I am providing continuous care for this patient that includes testing, discussion of options for treatment, and shared decision making with regard to the chosen treatment.  Patient has upcoming appointment for CPE in 2 months.

## 2024-09-11 NOTE — HISTORY OF PRESENT ILLNESS
[FreeTextEntry8] : 84 year-old female past medical history mitral valve prolapse, hypertension, HLD, GERD, and PE/DVT on eliquis, macular degeration presents with concern of chest discomfort and leg pain. The patient reports chest discomfort for the past few days and has experienced similar symptoms before due to GERD. She had a bout of severe diarrhea that started about 3-4 days ago which she attributes to a stomach virus but that has seem to abated. Her greater concern is bilateral leg pain that started around the time of the diarrhea. The pain gets better with walking and especially bothers her at night. She has a history of DVT and pulmonary embolism. She recently had new injections for macular degeneration. Drinks about 2-3 glasses of water daily.  Review of Systems : Based on the patient's complaints, multiple systems are involved. However, a comprehensive review is not provided. - General : The patient has had a few days of ill-health feeling, potentially associated with her chief complaints. - Neurological : Complaints of severe headaches. - Musculoskeletal : Complaints of leg pain, especially at night. The pain seems to alleviate with walking. Pain history noted predominantly in left leg, which is the side of previous DVT. - Cardiovascular : Complaints of palpitations concurrent with chest discomfort. History of DVT and pulmonary embolism. - Gastrointestinal : Recent episode of severe diarrhea. Has GERD for which she is on Pepcid. Was just taking once daily, considering increasing to full dose.

## 2024-09-17 DIAGNOSIS — R30.0 DYSURIA: ICD-10-CM

## 2024-09-18 LAB
APPEARANCE: CLEAR
BACTERIA: NEGATIVE /HPF
BILIRUBIN URINE: NEGATIVE
BLOOD URINE: NEGATIVE
CAST: 1 /LPF
COLOR: YELLOW
EPITHELIAL CELLS: 1 /HPF
GLUCOSE QUALITATIVE U: NEGATIVE MG/DL
KETONES URINE: NEGATIVE MG/DL
LEUKOCYTE ESTERASE URINE: ABNORMAL
MICROSCOPIC-UA: NORMAL
NITRITE URINE: NEGATIVE
PH URINE: 6.5
PROTEIN URINE: NEGATIVE MG/DL
RED BLOOD CELLS URINE: 1 /HPF
SPECIFIC GRAVITY URINE: 1.01
UROBILINOGEN URINE: 0.2 MG/DL
WHITE BLOOD CELLS URINE: 60 /HPF

## 2024-09-22 LAB — BACTERIA UR CULT: ABNORMAL

## 2024-10-05 ENCOUNTER — NON-APPOINTMENT (OUTPATIENT)
Age: 84
End: 2024-10-05

## 2024-10-05 DIAGNOSIS — R30.0 DYSURIA: ICD-10-CM

## 2024-10-05 LAB
APPEARANCE: ABNORMAL
BACTERIA: ABNORMAL /HPF
BILIRUBIN URINE: NEGATIVE
BLOOD URINE: ABNORMAL
CAST: 2 /LPF
COLOR: YELLOW
EPITHELIAL CELLS: 0 /HPF
GLUCOSE QUALITATIVE U: NEGATIVE MG/DL
KETONES URINE: NEGATIVE MG/DL
LEUKOCYTE ESTERASE URINE: ABNORMAL
MICROSCOPIC-UA: NORMAL
NITRITE URINE: POSITIVE
PH URINE: 8
PROTEIN URINE: 100 MG/DL
RED BLOOD CELLS URINE: 4 /HPF
SPECIFIC GRAVITY URINE: 1.02
UROBILINOGEN URINE: 1 MG/DL
WHITE BLOOD CELLS URINE: 6 /HPF

## 2024-10-05 RX ORDER — SULFAMETHOXAZOLE AND TRIMETHOPRIM 800; 160 MG/1; MG/1
800-160 TABLET ORAL
Qty: 6 | Refills: 0 | Status: COMPLETED | COMMUNITY
Start: 2024-10-05 | End: 2024-10-08

## 2024-10-08 LAB — BACTERIA UR CULT: ABNORMAL

## 2024-10-22 DIAGNOSIS — R91.8 OTHER NONSPECIFIC ABNORMAL FINDING OF LUNG FIELD: ICD-10-CM

## 2024-10-24 ENCOUNTER — APPOINTMENT (OUTPATIENT)
Dept: CT IMAGING | Facility: HOSPITAL | Age: 84
End: 2024-10-24

## 2024-10-24 PROCEDURE — 71250 CT THORAX DX C-: CPT | Mod: 26

## 2024-11-13 DIAGNOSIS — R30.0 DYSURIA: ICD-10-CM

## 2024-11-13 DIAGNOSIS — N39.0 URINARY TRACT INFECTION, SITE NOT SPECIFIED: ICD-10-CM

## 2024-11-15 LAB
APPEARANCE: CLEAR
BACTERIA: ABNORMAL /HPF
BILIRUBIN URINE: NEGATIVE
BLOOD URINE: ABNORMAL
CAST: NORMAL /LPF
COLOR: YELLOW
EPITHELIAL CELLS: 1 /HPF
GLUCOSE QUALITATIVE U: NEGATIVE MG/DL
KETONES URINE: NEGATIVE MG/DL
LEUKOCYTE ESTERASE URINE: ABNORMAL
MICROSCOPIC-UA: NORMAL
NITRITE URINE: POSITIVE
PH URINE: 7.5
PROTEIN URINE: 30 MG/DL
RED BLOOD CELLS URINE: 0 /HPF
REVIEW: NORMAL
SPECIFIC GRAVITY URINE: 1.02
UROBILINOGEN URINE: 1 MG/DL
WBC CLUMPS: PRESENT
WHITE BLOOD CELLS URINE: 292 /HPF

## 2024-11-18 LAB — BACTERIA UR CULT: NORMAL

## 2024-11-26 ENCOUNTER — APPOINTMENT (OUTPATIENT)
Dept: CARDIOLOGY | Facility: CLINIC | Age: 84
End: 2024-11-26

## 2024-12-10 ENCOUNTER — NON-APPOINTMENT (OUTPATIENT)
Age: 84
End: 2024-12-10

## 2024-12-10 ENCOUNTER — APPOINTMENT (OUTPATIENT)
Dept: CARDIOLOGY | Facility: CLINIC | Age: 84
End: 2024-12-10
Payer: MEDICARE

## 2024-12-10 VITALS
BODY MASS INDEX: 27.56 KG/M2 | OXYGEN SATURATION: 96 % | HEIGHT: 61 IN | WEIGHT: 146 LBS | HEART RATE: 60 BPM | DIASTOLIC BLOOD PRESSURE: 73 MMHG | SYSTOLIC BLOOD PRESSURE: 127 MMHG

## 2024-12-10 DIAGNOSIS — I10 ESSENTIAL (PRIMARY) HYPERTENSION: ICD-10-CM

## 2024-12-10 PROCEDURE — 93000 ELECTROCARDIOGRAM COMPLETE: CPT

## 2024-12-10 PROCEDURE — 99213 OFFICE O/P EST LOW 20 MIN: CPT | Mod: 25

## 2024-12-11 ENCOUNTER — APPOINTMENT (OUTPATIENT)
Dept: UROLOGY | Facility: CLINIC | Age: 84
End: 2024-12-11
Payer: MEDICARE

## 2024-12-11 VITALS
TEMPERATURE: 97.5 F | WEIGHT: 146 LBS | BODY MASS INDEX: 27.56 KG/M2 | SYSTOLIC BLOOD PRESSURE: 120 MMHG | DIASTOLIC BLOOD PRESSURE: 75 MMHG | HEART RATE: 80 BPM | HEIGHT: 61 IN | OXYGEN SATURATION: 97 %

## 2024-12-11 DIAGNOSIS — R32 UNSPECIFIED CONTACT DERMATITIS DUE TO OTHER AGENTS: ICD-10-CM

## 2024-12-11 DIAGNOSIS — L25.8 UNSPECIFIED CONTACT DERMATITIS DUE TO OTHER AGENTS: ICD-10-CM

## 2024-12-11 DIAGNOSIS — N39.0 URINARY TRACT INFECTION, SITE NOT SPECIFIED: ICD-10-CM

## 2024-12-11 LAB
BILIRUB UR QL STRIP: NEGATIVE
CLARITY UR: CLEAR
COLLECTION METHOD: NORMAL
GLUCOSE UR-MCNC: NEGATIVE
HCG UR QL: 0.2 EU/DL
HGB UR QL STRIP.AUTO: ABNORMAL
KETONES UR-MCNC: NEGATIVE
LEUKOCYTE ESTERASE UR QL STRIP: ABNORMAL
NITRITE UR QL STRIP: NEGATIVE
PH UR STRIP: 6
PROT UR STRIP-MCNC: NEGATIVE
SP GR UR STRIP: 1.02

## 2024-12-11 PROCEDURE — 99204 OFFICE O/P NEW MOD 45 MIN: CPT

## 2024-12-11 PROCEDURE — 99459 PELVIC EXAMINATION: CPT

## 2024-12-11 PROCEDURE — 51798 US URINE CAPACITY MEASURE: CPT

## 2024-12-11 RX ORDER — CLOTRIMAZOLE AND BETAMETHASONE DIPROPIONATE 10; .5 MG/G; MG/G
1-0.05 CREAM TOPICAL TWICE DAILY
Qty: 1 | Refills: 0 | Status: ACTIVE | COMMUNITY
Start: 2024-12-11 | End: 1900-01-01

## 2024-12-12 ENCOUNTER — OUTPATIENT (OUTPATIENT)
Dept: OUTPATIENT SERVICES | Facility: HOSPITAL | Age: 84
LOS: 1 days | Discharge: HOME | End: 2024-12-12

## 2024-12-12 DIAGNOSIS — I26.99 OTHER PULMONARY EMBOLISM WITHOUT ACUTE COR PULMONALE: ICD-10-CM

## 2024-12-12 LAB
APPEARANCE: CLEAR
BACTERIA: NEGATIVE /HPF
BILIRUBIN URINE: NEGATIVE
BLOOD URINE: ABNORMAL
CAST: 3 /LPF
COLOR: YELLOW
EPITHELIAL CELLS: 2 /HPF
GLUCOSE QUALITATIVE U: NEGATIVE MG/DL
KETONES URINE: NEGATIVE MG/DL
LEUKOCYTE ESTERASE URINE: ABNORMAL
MICROSCOPIC-UA: NORMAL
NITRITE URINE: NEGATIVE
PH URINE: 7
PROTEIN URINE: NORMAL MG/DL
RED BLOOD CELLS URINE: 2 /HPF
SPECIFIC GRAVITY URINE: 1.01
UROBILINOGEN URINE: 0.2 MG/DL
WHITE BLOOD CELLS URINE: 9 /HPF

## 2024-12-13 RX ORDER — CEPHALEXIN 500 MG/1
500 CAPSULE ORAL 3 TIMES DAILY
Qty: 9 | Refills: 0 | Status: ACTIVE | COMMUNITY
Start: 2024-12-13 | End: 1900-01-01

## 2024-12-16 ENCOUNTER — NON-APPOINTMENT (OUTPATIENT)
Age: 84
End: 2024-12-16

## 2024-12-16 ENCOUNTER — APPOINTMENT (OUTPATIENT)
Dept: HEMATOLOGY ONCOLOGY | Facility: CLINIC | Age: 84
End: 2024-12-16
Payer: MEDICARE

## 2024-12-16 DIAGNOSIS — I26.99 OTHER PULMONARY EMBOLISM W/OUT ACUTE COR PULMONALE: ICD-10-CM

## 2024-12-16 LAB — BACTERIA UR CULT: ABNORMAL

## 2024-12-16 PROCEDURE — 99212 OFFICE O/P EST SF 10 MIN: CPT

## 2025-01-02 ENCOUNTER — APPOINTMENT (OUTPATIENT)
Dept: FAMILY MEDICINE | Facility: CLINIC | Age: 85
End: 2025-01-02
Payer: MEDICARE

## 2025-01-02 VITALS
BODY MASS INDEX: 27.38 KG/M2 | RESPIRATION RATE: 15 BRPM | WEIGHT: 145 LBS | SYSTOLIC BLOOD PRESSURE: 126 MMHG | HEART RATE: 73 BPM | OXYGEN SATURATION: 95 % | TEMPERATURE: 97.6 F | DIASTOLIC BLOOD PRESSURE: 74 MMHG | HEIGHT: 61 IN

## 2025-01-02 DIAGNOSIS — Z12.31 ENCOUNTER FOR SCREENING MAMMOGRAM FOR MALIGNANT NEOPLASM OF BREAST: ICD-10-CM

## 2025-01-02 DIAGNOSIS — Z00.00 ENCOUNTER FOR GENERAL ADULT MEDICAL EXAMINATION W/OUT ABNORMAL FINDINGS: ICD-10-CM

## 2025-01-02 DIAGNOSIS — Z13.820 ENCOUNTER FOR SCREENING FOR OSTEOPOROSIS: ICD-10-CM

## 2025-01-02 DIAGNOSIS — Z12.11 ENCOUNTER FOR SCREENING FOR MALIGNANT NEOPLASM OF COLON: ICD-10-CM

## 2025-01-02 DIAGNOSIS — Z23 ENCOUNTER FOR IMMUNIZATION: ICD-10-CM

## 2025-01-02 DIAGNOSIS — Z87.898 PERSONAL HISTORY OF OTHER SPECIFIED CONDITIONS: ICD-10-CM

## 2025-01-02 DIAGNOSIS — I10 ESSENTIAL (PRIMARY) HYPERTENSION: ICD-10-CM

## 2025-01-02 PROCEDURE — G0439: CPT

## 2025-01-02 PROCEDURE — 90662 IIV NO PRSV INCREASED AG IM: CPT

## 2025-01-02 PROCEDURE — G0008: CPT

## 2025-01-08 ENCOUNTER — APPOINTMENT (OUTPATIENT)
Dept: UROLOGY | Facility: CLINIC | Age: 85
End: 2025-01-08
Payer: MEDICARE

## 2025-01-08 VITALS
OXYGEN SATURATION: 94 % | HEART RATE: 69 BPM | BODY MASS INDEX: 27.38 KG/M2 | HEIGHT: 61 IN | WEIGHT: 145 LBS | SYSTOLIC BLOOD PRESSURE: 125 MMHG | DIASTOLIC BLOOD PRESSURE: 75 MMHG

## 2025-01-08 DIAGNOSIS — R32 UNSPECIFIED URINARY INCONTINENCE: ICD-10-CM

## 2025-01-08 PROCEDURE — 99214 OFFICE O/P EST MOD 30 MIN: CPT

## 2025-01-08 PROCEDURE — 51798 US URINE CAPACITY MEASURE: CPT

## 2025-01-08 RX ORDER — MIRABEGRON 25 MG/1
25 TABLET, EXTENDED RELEASE ORAL
Qty: 90 | Refills: 2 | Status: ACTIVE | COMMUNITY
Start: 2025-01-08 | End: 1900-01-01

## 2025-02-24 ENCOUNTER — APPOINTMENT (OUTPATIENT)
Dept: UROLOGY | Facility: CLINIC | Age: 85
End: 2025-02-24

## 2025-03-04 ENCOUNTER — EMERGENCY (EMERGENCY)
Facility: HOSPITAL | Age: 85
LOS: 1 days | Discharge: HOME | End: 2025-03-04
Payer: MEDICARE

## 2025-03-04 VITALS
SYSTOLIC BLOOD PRESSURE: 134 MMHG | RESPIRATION RATE: 16 BRPM | TEMPERATURE: 98 F | OXYGEN SATURATION: 94 % | WEIGHT: 147.93 LBS | HEART RATE: 78 BPM | DIASTOLIC BLOOD PRESSURE: 57 MMHG

## 2025-03-04 RX ORDER — ACETAMINOPHEN 500 MG/5ML
975 LIQUID (ML) ORAL ONCE
Refills: 0 | Status: COMPLETED | OUTPATIENT
Start: 2025-03-04 | End: 2025-03-04

## 2025-03-04 RX ORDER — LIDOCAINE HCL/PF 10 MG/ML
10 VIAL (ML) INJECTION ONCE
Refills: 0 | Status: ACTIVE | OUTPATIENT
Start: 2025-03-04 | End: 2025-03-04

## 2025-03-04 RX ORDER — CLOSTRIDIUM TETANI TOXOID ANTIGEN (FORMALDEHYDE INACTIVATED), CORYNEBACTERIUM DIPHTHERIAE TOXOID ANTIGEN (FORMALDEHYDE INACTIVATED), BORDETELLA PERTUSSIS TOXOID ANTIGEN (GLUTARALDEHYDE INACTIVATED), BORDETELLA PERTUSSIS FILAMENTOUS HEMAGGLUTININ ANTIGEN (FORMALDEHYDE INACTIVATED), BORDETELLA PERTUSSIS PERTACTIN ANTIGEN, AND BORDETELLA PERTUSSIS FIMBRIAE 2/3 ANTIGEN 5; 2; 2.5; 5; 3; 5 [LF]/.5ML; [LF]/.5ML; UG/.5ML; UG/.5ML; UG/.5ML; UG/.5ML
0.5 INJECTION, SUSPENSION INTRAMUSCULAR ONCE
Refills: 0 | Status: COMPLETED | OUTPATIENT
Start: 2025-03-04 | End: 2025-03-04

## 2025-03-04 RX ADMIN — Medication 975 MILLIGRAM(S): at 21:26

## 2025-03-04 RX ADMIN — CLOSTRIDIUM TETANI TOXOID ANTIGEN (FORMALDEHYDE INACTIVATED), CORYNEBACTERIUM DIPHTHERIAE TOXOID ANTIGEN (FORMALDEHYDE INACTIVATED), BORDETELLA PERTUSSIS TOXOID ANTIGEN (GLUTARALDEHYDE INACTIVATED), BORDETELLA PERTUSSIS FILAMENTOUS HEMAGGLUTININ ANTIGEN (FORMALDEHYDE INACTIVATED), BORDETELLA PERTUSSIS PERTACTIN ANTIGEN, AND BORDETELLA PERTUSSIS FIMBRIAE 2/3 ANTIGEN 0.5 MILLILITER(S): 5; 2; 2.5; 5; 3; 5 INJECTION, SUSPENSION INTRAMUSCULAR at 21:25

## 2025-03-04 NOTE — ED ADULT TRIAGE NOTE - TEMPERATURE IN FAHRENHEIT (DEGREES F)
97.8

## 2025-03-04 NOTE — ED PROCEDURE NOTE - CPROC ED LACER REPAIR DETAIL1
The wound was explored to base in bloodless field.
The wound was explored to base in bloodless field./No foreign body

## 2025-03-04 NOTE — ED ADULT NURSE NOTE - CAS ELECT INFOMATION PROVIDED
Patient Education     Candida Infection: Thrush  Thrush is a fungal infection in the mouth and throat. Thrush does not usually affect healthy adults. It is more common in people with a weak immune system. It is also more likely if you take antibiotics. Thrush is normally not contagious.  Understanding fungus in the mouth and throat  Your mouth and throat normally contain millions of tiny organisms. These include bacteria and yeasts. Many of these do not cause any problems. In fact, they may help fight disease.  Yeasts are a type of fungus. A type of yeast called Candida normally lives on the membranes of your mouth and throat. Usually, this yeast grows only in small amounts and is harmless. But in some cases, Candida can grow out of control and cause thrush. Thrush is related to other kinds of Candida infections that can grow all over the body. Thrush refers to an infection of only the mouth and throat.  What causes thrush?  Thrush happens when something lets too much Candida grow inside your mouth and throat. Certain things that change the normal balance of organisms in the mouth can lead to thrush. One example is antibiotic medicine. This medicine may kill some of the normal bacteria in your mouth. Candida can then grow freely. People on antibiotics have an increased risk for thrush.  You have a higher risk for thrush if you:  · Wear dentures  · Are getting chemotherapy  · Are getting radiation therapy  · Have diabetes  · Have a transplanted organ  · Use corticosteroids, including inhaled corticosteroids for lung disease  · Have a weak immune system, such as from AIDS  · Are an older adult  Symptoms of thrush  Symptoms of thrush can include:  · A dry, cottony feeling in your mouth  · Cracking at the corners of the mouth  · Loss of taste  · Pain while eating or swallowing  · White patches on the tongue and around the sides of the mouth  Diagnosing thrush  Your healthcare provider will ask about your medical history  and your symptoms. He or she will look closely at your mouth and throat. White or red patches will be scraped with a tongue depressor. The sample will be sent to a lab to test. This test can usually confirm thrush.  If you have thrush, you may also have esophageal candidiasis. This is common in people who have HIV or a weak immune system. Your healthcare provider may check for this condition with an upper endoscopy. This is a procedure to look at the esophagus. A tissue sample may be taken to test.  Treatment for thrush  Thrush is usually treated with antifungal medicine. The medicine is put directly in your mouth and throat. You may be given a “swish and swallow” medicine or an antifungal lozenge.  In some cases, you may need an antifungal pill. This can remove Candida throughout your body. Or you may need medicine through an intravenous line ( IV). These treatments depend on how severe your infection is, and what other health conditions you have.  If you are at high risk for thrush, you may need to keep taking oral antifungal medicine. This is to help prevent thrush in the future.  What happens if you don’t get treated for thrush?  If untreated, the Candida may spread throughout your body. They may even enter your bloodstream. This can cause serious problems, such as organ failure and even death. Bloodstream infection may need to be treated with high doses of antifungal medicine through an IV.  Systemic infection is much more likely in people who are very ill. It is also more common in those who have serious problems with their immune system. Additional risk factors for systemic infection in very ill people include:  · Central venous lines  · IV nutrition  · Use of broad-spectrum antibiotics  · Kidney failure  · Recent surgery  Preventing thrush  You may be able to help prevent some cases of thrush. Make sure to:  · Practice good oral hygiene. Try using a chlorhexidine mouthwash.  · Clean your dentures regularly  as instructed. Make sure they fit you correctly.  · After using a corticosteroid inhaler, rinse out your mouth with water or mouthwash.  · Do not use broad-spectrum antibiotics, if possible.  · Get treated for health problems that increase your risk for thrush, such as diabetes.     When to call the healthcare provider  Call your healthcare provider right away if you have any of these:  · Cottony feeling in your mouth  · Loss of taste  · Pain while eating or swallowing  · White patches or plaques on your tongue or inside your mouth   Date Last Reviewed: 5/1/2017  © 2625-7200 [employer]. [address] [place] [id number]. All rights reserved. This information is not intended as a substitute for professional medical care. Always follow your healthcare professional's instructions.            DC instructions

## 2025-03-04 NOTE — ED ADULT NURSE NOTE - OBJECTIVE STATEMENT
85YF PMHX of HTN, HLD, Afib & DVT (eliquis) presents to the ED via EMS w/ granddaughter (also an EMS) c/o laceration of left eyebrow s/p mechanical trip and fall with no LOC. pt granddaughter reports walking with pt (to restaurant for dinner) and pt missed a step on the sidewalk and attempted to break her fall with her hands reached out. upon assessment, pt is A&OX4 oriented to self, place, time, situation. satting 95% on rm air. Afebrile orally. respirations even and unlabored. abdomen soft, nondistended. BAR Vann at bedside to assess. Patient undressed and placed into gown, call bell in hand and side rails up for safety. warm blanket provided, vital signs stable, pt in no acute distress. updated on plan of care. comfort and safety maintained

## 2025-03-04 NOTE — ED PROVIDER NOTE - ATTENDING CONTRIBUTION TO CARE
Emergency Medicine Attending MD Deal:  patient seen and evaluated with the PA.  I was present for key portions of the History and Physical, and I agree with the Impression and Plan.      Patient is a 85-year-old female complaining of mechanical trip and fall forward striking her face on the ground.  Main complaint is right supraorbital laceration and right.  +AC on Eliquis    VS: Mild hypertension appreciated, otherwise within normal limits  Gen: Elderly female, 1 cm laceration above the left eyebrow  Head: NC/AT extraocular venous intact, pupils equal round react to light left orbit ecchymosis,  Neck: trachea midline, no midline tenderness to palpation  Resp:  No distress  CV: RRR, no RMG  Abd: Soft, nondistended,+ left flank soft tissue tenderness to palpation without ecchymosis  Ext: no deformities  Spine: No midline tenderness to palpation along entire spine  Neuro:  A&Ox4 appears non focal  Skin:  Warm and dry as visualized  Psych: appropriate    Medical Decision Making / Differential Diagnosis:  HPI concerning for closed head injury, facial laceration will require repair  Plan: CT head max face C-spine, chest x-ray, pelvis with right hip x-ray.

## 2025-03-04 NOTE — ED PROCEDURE NOTE - NS ED ATTENDING STATEMENT MOD
This was a shared visit with the MAGDY. I reviewed and verified the documentation.
This was a shared visit with the MAGDY. I reviewed and verified the documentation.

## 2025-03-04 NOTE — ED PROVIDER NOTE - CLINICAL SUMMARY MEDICAL DECISION MAKING FREE TEXT BOX
85-year-old female with history of DVT/PE on Eliquis, hyperlipidemia, macular degeneration presents to the emergency department after a fall.  Patient was walking with her granddaughter reports she tripped over a curb falling forward.  She tried to brace her fall with her hands but had head strike without LOC.  Patient sustained a laceration to her eyebrow during fall.  Reports pain in the forehead as well as pain in the left hand and left knee.  Patient also reports she noticed pain in the right lower back above the hip when being placed in stretcher when she feels with movement, did not feel this initially as a fall.  She denies dizziness, neck pain, chest pain, shortness of breath, abdominal pain, numbness, vision changes.  Unsure last tetanus.    Exam:  CONSTITUTIONAL: Patient is awake, alert and oriented x 3. Patient is well appearing and in no acute distress  HEAD/FACE: There is an approximate the 1-1/2 cm laceration to the medial aspect of the left eyebrow.  There is associated swelling with ecchymosis to the left superior periorbital area with hematoma to the  left side forehead.  Mild tenderness.  He is to the left superior orbital bone, otherwise no focal bony tenderness patient throughout the face  EYES: PERRL b/l, EOMI  LUNGS: CTA b/l, no wheezing or rales. No ttp to the anterior, lateral or posterior chest wall   HEART: RRR.+S1S2 no murmurs  ABDOMEN: Soft, non-distended, nttp,  no rebound or guarding  EXTREMITY: FROM upper and lower ext b/l. There are small superficial abrasions overlying the left thenar eminence with faint ecchymosis and overlying tenderness palpation.  Otherwise no focal tenderness palpation to the left wrist/hand or pain elicited with range of motion of the left wrist/hand.  There is a superficial linear laceration of the left knee without focal tenderness with patient of the left knee. Patient is neurovascularly intact. No midline cervical, thoracic or lumbar ttp. There is Tenderness to palpation of the right side lower back soft tissue, just superior to the hip, without focal bony tenderness of patient of the right hip  NEURO: No focal deficits     Given fall with head injury on anti-coagulation, plan to obtain CT imaging of the head to evaluate for intracranial bleed as well as maxillofacal and cervical spine to evaluate for  fracture. Additionally based off physical exam,  will order Xrays of the left hand, left knee and right hip with pelvis as well as screening x-ray of the chest. Will update tetanus and provide acetaminophen for pain control.  Patient will likely require stitches to the forehead laceration after wound is explored and cleansed, anticipate Dermabond closure of the superficial knee laceration.  Dispo pending results of patient imaging and reassessment 85-year-old female with history of DVT/PE on Eliquis, hyperlipidemia, macular degeneration presents to the emergency department after a fall.  Patient was walking with her granddaughter reports she tripped over a curb falling forward.  She tried to brace her fall with her hands but had head strike without LOC.  Patient sustained a laceration to her eyebrow during fall.  Reports pain in the forehead as well as pain in the left hand and left knee.  Patient also reports she noticed pain in the right lower back above the hip when being placed in stretcher when she feels with movement, did not feel this initially as a fall.  She denies dizziness, neck pain, chest pain, shortness of breath, abdominal pain, numbness, vision changes.  Unsure last tetanus.    Exam:  CONSTITUTIONAL: Patient is awake, alert and oriented x 3. Patient is well appearing and in no acute distress  HEAD/FACE: There is an approximate the 1-1/2 cm laceration to the medial aspect of the left eyebrow.  There is associated swelling with ecchymosis to the left superior periorbital area with hematoma to the  left side forehead.  Mild tenderness.  He is to the left superior orbital bone, otherwise no focal bony tenderness patient throughout the face  EYES: PERRL b/l, EOMI  LUNGS: CTA b/l, no wheezing or rales. No ttp to the anterior, lateral or posterior chest wall   HEART: RRR.+S1S2 no murmurs  ABDOMEN: Soft, non-distended, nttp,  no rebound or guarding  EXTREMITY: FROM upper and lower ext b/l. There are small superficial abrasions overlying the left thenar eminence with faint ecchymosis and overlying tenderness palpation.  Otherwise no focal tenderness palpation to the left wrist/hand or pain elicited with range of motion of the left wrist/hand.  There is a superficial linear laceration of the left knee without focal tenderness with patient of the left knee. Patient is neurovascularly intact. No midline cervical, thoracic or lumbar ttp. There is Tenderness to palpation of the right side lower back soft tissue, just superior to the hip, without focal bony tenderness of patient of the right hip  NEURO: No focal deficits     Given fall with head injury on anti-coagulation, plan to obtain CT imaging of the head to evaluate for intracranial bleed as well as maxillofacal and cervical spine to evaluate for  fracture. Additionally based off physical exam,  will order Xrays of the left hand, left knee, lumbosacral spine and right hip with pelvis as well as screening x-ray of the chest. Will update tetanus and provide acetaminophen for pain control.  Patient will likely require stitches to the forehead laceration after wound is explored and cleansed, anticipate Dermabond closure of the superficial knee laceration.  Dispo pending results of patient imaging and reassessment

## 2025-03-04 NOTE — ED PROCEDURE NOTE - ATTENDING APP SHARED VISIT CONTRIBUTION OF CARE
MD Deal:  patient seen and evaluated with the PA.  I agree with the above procedure note.
MD Deal:  patient seen and evaluated with the PA.  I agree with the above procedure note.

## 2025-03-04 NOTE — ED PROCEDURE NOTE - CPROC ED TIME OUT STATEMENT1
“Patient's name, , procedure and correct site were confirmed during the [place] Timeout.”
“Patient's name, , procedure and correct site were confirmed during the [place] Timeout.”

## 2025-03-04 NOTE — ED PROVIDER NOTE - PATIENT PORTAL LINK FT
You can access the FollowMyHealth Patient Portal offered by [facility] by registering at the following website: [email or web address]. By joining [employer]’s FollowMyHealth portal, you will also be able to view your health information using other applications (apps) compatible with our system.

## 2025-03-04 NOTE — ED PROVIDER NOTE - NSDCPRINTRESULTS_ED_ALL_ED
monthly or less Patient requests all Lab, Cardiology, and Radiology Results on their Discharge Instructions

## 2025-03-04 NOTE — ED PROVIDER NOTE - CARE PLAN
1 Principal Discharge DX:	Facial laceration  Secondary Diagnosis:	Hematoma of scalp  Secondary Diagnosis:	Lumbar back pain

## 2025-03-04 NOTE — ED PROVIDER NOTE - PROGRESS NOTE DETAILS
Pt lumbosacral xray with "Questionable cortical defect involving the posterior L5 vertebral body   likely to favor nutrient foramen. Correlate for point tenderness". Pt with pain in the soft tissue on the right side lower back with no midline ttp throughout the lumbar spine. Pending results of CT imaging at this time   Soo Rubio PA-C CT images now returned.  CT brain with frontal scalp hemorrhagic contusion, no acute intracranial pathology.  There was finding of a age-indeterminate, likely chronic nasal bone fracture as well changes consistent with chronic mastoiditis. Visualized lungs with chronic appearing interstitial changes.  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  These results and preliminary results of all x-rays were discussed with patient and granddaughter at bedside.  Plan to discharge home.  Soo Rubio PA-C Spoke to radiology as chest x-ray is not yet read, believe patient x-ray findings are suggestive of increased interstitial markings without effusion or consolidation.  Patient reports that she has known lung nodules for which she gets serial CT exams. Will discharge, patient and granddaughter are aware that they will receive a call if there is any change in final x-ray reads after attending views in the a.m. Spoke to radiology as chest x-ray is not yet read, specifically questioned cardiophrenic angle findings, believe patient x-ray findings are suggestive of increased interstitial markings without effusion or consolidation.  Patient reports that she has known lung nodules for which she gets serial CT exams. Will discharge, patient and granddaughter are aware that they will receive a call if there is any change in final x-ray reads after attending views in the a.m.  Soo Rubio PA-C

## 2025-03-04 NOTE — ED PROVIDER NOTE - NSFOLLOWUPINSTRUCTIONS_ED_ALL_ED_FT
1. Please follow up with your Primary Care Doctor after discharge, bring a copy of your results to follow up appointment for review     2. Please rest, stay hydrated and continue all at home medications as previously prescribed    3. For continued or recurrent pain recommend taking over the counter Tylenol (acetaminophen) 650 mg every 6 hours as needed. Additionally recommend applying ice packs to areas of pain for 20 minute intervals several times per day for the next few days     4. Keep stiches clean and dry for initial  24 hours, you may then clean gently with soap and water    5. You may  apply bacitracin ointment twice a day for the next 3-5 days      6. Return to the Emergency Department ( alternatively yo may follow with Urgent Care or your PCP)  in 1 week for removal of stiches    7. Return to ED sooner for any new or worsened symptoms of concern 1. Please follow up with your Primary Care Doctor after discharge, bring a copy of your results to follow up appointment for review     2. Please rest, stay hydrated and continue all at home medications as previously prescribed    3. For continued or recurrent pain recommend taking over the counter Tylenol (acetaminophen) 650 mg every 6 hours as needed. Additionally recommend applying ice packs to areas of pain for 20 minute intervals several times per day for the next few days     4. Keep stiches clean and dry for initial  24 hours, you may then clean gently with soap and water    5. You may  apply bacitracin ointment twice a day for the next 3-5 days      6. Return to the Emergency Department ( alternatively yo may follow with Urgent Care or your PCP)  in 1 week for removal of stiches    7. Call the [facility] at [phone]  for further evaluation and management if you have persistent back pain      8. Return to ED sooner for any new or worsened symptoms of concern 1. Please follow up with your Primary Care Doctor after discharge, bring a copy of your results to follow up appointment for review     2. Please rest, stay hydrated and continue all at home medications as previously prescribed    3. For continued or recurrent pain recommend taking over the counter Tylenol (acetaminophen) 650 mg every 6 hours as needed. Additionally recommend applying ice packs to areas of pain for 20 minute intervals several times per day for the next few days     4. Keep stiches clean and dry for initial  24 hours, you may then clean gently with soap and water    5. You may  apply bacitracin ointment twice a day for the next 3-5 days      6. Return to the Emergency Department ( alternatively you may follow with Urgent Care or your PCP)  in 1 week for removal of stiches    7. Call the [facility] at [phone]  for further evaluation and management if you have persistent back pain      8. You will receive a call for any outstanding results or may call our ED Administration line at [phone] daily 9am-3pm to obtain results    9. Return to ED sooner for any new or worsened symptoms of concern

## 2025-03-05 VITALS
SYSTOLIC BLOOD PRESSURE: 169 MMHG | RESPIRATION RATE: 17 BRPM | OXYGEN SATURATION: 95 % | TEMPERATURE: 98 F | HEART RATE: 76 BPM | DIASTOLIC BLOOD PRESSURE: 77 MMHG

## 2025-03-07 ENCOUNTER — EMERGENCY (EMERGENCY)
Facility: HOSPITAL | Age: 85
LOS: 1 days | Discharge: HOME | End: 2025-03-07
Attending: EMERGENCY MEDICINE | Admitting: EMERGENCY MEDICINE
Payer: MEDICARE

## 2025-03-07 VITALS
OXYGEN SATURATION: 95 % | RESPIRATION RATE: 18 BRPM | SYSTOLIC BLOOD PRESSURE: 150 MMHG | HEART RATE: 90 BPM | DIASTOLIC BLOOD PRESSURE: 82 MMHG

## 2025-03-07 VITALS
SYSTOLIC BLOOD PRESSURE: 176 MMHG | RESPIRATION RATE: 18 BRPM | HEART RATE: 100 BPM | TEMPERATURE: 98 F | HEIGHT: 62 IN | OXYGEN SATURATION: 93 % | DIASTOLIC BLOOD PRESSURE: 79 MMHG | WEIGHT: 147.93 LBS

## 2025-03-07 PROCEDURE — 99284 EMERGENCY DEPT VISIT MOD MDM: CPT

## 2025-03-07 PROCEDURE — 99283 EMERGENCY DEPT VISIT LOW MDM: CPT

## 2025-03-07 RX ORDER — LIDOCAINE HYDROCHLORIDE 20 MG/ML
1 JELLY TOPICAL
Qty: 3 | Refills: 0
Start: 2025-03-07 | End: 2025-03-11

## 2025-03-07 RX ORDER — TIZANIDINE 4 MG/1
2 TABLET ORAL ONCE
Refills: 0 | Status: COMPLETED | OUTPATIENT
Start: 2025-03-07 | End: 2025-03-07

## 2025-03-07 RX ORDER — TRAMADOL HYDROCHLORIDE 50 MG/1
50 TABLET, FILM COATED ORAL ONCE
Refills: 0 | Status: DISCONTINUED | OUTPATIENT
Start: 2025-03-07 | End: 2025-03-07

## 2025-03-07 RX ORDER — LIDOCAINE HYDROCHLORIDE 20 MG/ML
1 JELLY TOPICAL ONCE
Refills: 0 | Status: COMPLETED | OUTPATIENT
Start: 2025-03-07 | End: 2025-03-07

## 2025-03-07 RX ORDER — TRAMADOL HYDROCHLORIDE 50 MG/1
1 TABLET, FILM COATED ORAL
Qty: 15 | Refills: 0
Start: 2025-03-07 | End: 2025-03-11

## 2025-03-07 RX ORDER — TIZANIDINE 4 MG/1
1 TABLET ORAL
Qty: 15 | Refills: 0
Start: 2025-03-07 | End: 2025-03-11

## 2025-03-07 RX ADMIN — TIZANIDINE 2 MILLIGRAM(S): 4 TABLET ORAL at 01:58

## 2025-03-07 RX ADMIN — LIDOCAINE HYDROCHLORIDE 1 PATCH: 20 JELLY TOPICAL at 02:01

## 2025-03-07 RX ADMIN — TRAMADOL HYDROCHLORIDE 50 MILLIGRAM(S): 50 TABLET, FILM COATED ORAL at 01:57

## 2025-03-07 NOTE — ED PROVIDER NOTE - TEST CONSIDERED BUT NOT PERFORMED
CT abd / pelvis considered however since no bruising, ranging well, and delayed onset per history - likely muscular related pain (ie: delayed onset muscle soreness). Tests Considered But Not Performed

## 2025-03-07 NOTE — ED ADULT TRIAGE NOTE - CHIEF COMPLAINT QUOTE
Pt c/o right sided back pain that is causing her to be unable to sleep. Recently seen in the ER two days ago after a trip and fall. Pt c/o right sided back pain that is causing her to be unable to sleep. Recently seen in the ER two days ago after a trip and fall. On Eliquis.

## 2025-03-07 NOTE — ED ADULT NURSE NOTE - IS THE PATIENT ABLE TO BE SCREENED?
ILLNESS EVALUATION                 11/4/2019     SUBJECTIVE  Nae Mena is a 73 year old female who presents today to follow up on her hypertension, weight, as well as her medications and labs.  She has been working with Dr Lindsey and is still having some pain in the left low back and into the left buttock.  She also has some pain down the right leg.  The left seems worse than the right and while his treatment is helping, she is scheduled to see him again later this week for additional injections.  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  Recent labs showed that her A1c was 7 and her WBCs were slightly elevated, both of which may be related to recent steroid use.  Will need to be followed up on in 3 months.    She also has noted that occasionally she will feel a bit shaky and have a tremor.  This is seen only in her hands and tends to be only noted in the early morning before eating.  She has not seen this at any other time of the day and there is no weakness or numbness associated with this.     ALLERGIES:  Amoxicillin-pot clavulanate; Benadryl allergy; Latanoprost; Lovastatin; Pravastatin; Soap & cleansers; Cinnamon; and Fish oil    Current Outpatient Medications   Medication Sig   • timolol (TIMOPTIC) 0.5 % ophthalmic solution Place 1 drop into both eyes 2 times daily.   • diclofenac (VOLTAREN) 1 % gel Apply 2 g topically 4 times daily. Apply to the lower back area.   • bimatoprost (LUMIGAN) 0.01 % ophthalmic solution Place 1 drop into both eyes every evening.   • Multiple Minerals-Vitamins (NF FORMULAS CALCIUM MAGNESIUM) Liquid Take by mouth daily.   • Simethicone 125 MG Tab See Colonoscopy Instructions.   • bisacodyl (DULCOLAX) 5 MG EC tablet See Colonoscopy Instructions.   • Na Sulfate-K Sulfate-Mg Sulf 17.5-3.13-1.6 GM/177ML Solution See Colonoscopy Instructions.   • Propylene Glycol-Glycerin (MOISTURE EYES OP)    • Multiple Vitamins-Minerals (EYE SUPPORT PO)    •  aspirin-acetaminophen-caffeine (EXCEDRIN MIGRAINE) 250-250-65 MG per tablet Take 81 mg by mouth.   • Ascorbic Acid (VITAMIN C) 500 MG Cap    • benazepril (LOTENSIN) 40 MG tablet Take 40 mg by mouth daily.    • calcium citrate/vit D (CITRACAL + D) 315-200 MG-UNIT per tablet    • Cyanocobalamin (B-12) 1000 MCG Cap    • EPINEPHrine 0.3 MG/0.3ML auto-injector 0.3 mg.   • famotidine (PEPCID) 10 MG tablet Take by mouth as needed.    • Magnesium Sulfate 70 MG Cap Take by mouth daily.    • nicotinic acid (NIACIN) 500 MG tablet Take 500 mg by mouth.   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  Skin:  warm, dry no evidence of rash or other lesions  HEENT:  EOMI  The nasal sinuses are normal.  The nasal septum is midline.  Bilateral normal TM's and external auditory canals.  The soft palate is symmetrical without lesion.  The posterior pharynx is without lesion, edema or erythema.  Neck:   The trachea is midline.  No cervical or supraclavicular lymphadenopathy.  Respiratory:   Bilaterally clear  to auscultation   Cardiovascular:  Regular rate and rhythm  Abdomen:  Abdomen: soft, non-tender, non-distended. Positive bowel sounds all quadrants. No guarding or rebound. No hepatomegaly or splenomegaly.  Extremities:   No edema.  No deformity.  No tenderness.  Back:   Pain over low lumbar region and SI bilaterally   Neuro:   Alert, oriented x4.  Speech intact.  No dysphasia or dysarthria.  Symmetrical facial structures.  Strength 5/5 all extremities.  Sensation intact to light touch.  CN II-XII intact and no tremors noted.  Neurocirc intact throughout.  Psych:   Affect is normal     ASSESSMENT/PLAN  Essential hypertension  (primary encounter diagnosis)  Plan: COMPREHENSIVE MET PNL (FAST)       At goal, continue present management.  We did discuss improving her diet and applauded her for her 6 pound weight loss.  She understands the impact that additional weight loss would have on improving both her and impaired fasting glucose as well as her blood pressure and her chronic low back pain.    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 Lindsey need for additional treatment.    Leukocytosis, unspecified type  Plan: CBC WITH DIFFERENTIAL        Repeat labs in 3 months.    IFG (impaired fasting glucose)  Plan: COMPREHENSIVE MET PNL (FAST), GLYCOHEMOGLOBIN        Continue to work on diet and exercise in an attempt to reduce risks.    Tremor  Plan: Based on timing and description this very well could be hypoglycemia.  She will try to make sure that she eats breakfast and waking to help reduce the potential for recurrence.  She will follow-up should she see that there is no correlation to eating or should she begin noticing this at other times of the day or worsening        Instructed to call if the problem worsens or does not improve.  Schedule follow-up: in 6 month(s)    Alexis Munoz,     Yes

## 2025-03-07 NOTE — ED PROVIDER NOTE - OBJECTIVE STATEMENT
85-year-old female with history of DVT/PE on Eliquis, hyperlipidemia, macular degeneration presents to the emergency department after a fall.  Patient was walking with her granddaughter reports she tripped over a curb falling forward.  She tried to brace her fall with her hands but had head strike without LOC.  Patient sustained a laceration to her eyebrow during fall.  Reports pain in the forehead as well as pain in the left hand and left knee.  Patient also reports she noticed pain in the right lower back above the hip when being placed in stretcher when she feels with movement, did not feel this initially as a fall.  She denies dizziness, neck pain, chest pain, shortness of breath, abdominal pain, numbness, vision changes.  Unsure last tetanus. 85-year-old female with history of DVT/PE on Eliquis, hyperlipidemia, macular degeneration presents to the emergency department after a fall.  Patient was walking with her granddaughter reports she tripped over a curb falling forward.  She tried to brace her fall with her hands but had head strike without LOC.  Patient was evaluated at [facility].  Discharged with Tylenol.  Patient states she did not have right lower back pain above the hip until afterward.  She denies dizziness, neck pain, chest pain, shortness of breath, abdominal pain, numbness, vision changes. Reports that she had Tylenol however cannot sleep due to pain.  Asking for something to help relax the muscle spasms.  No numbness tingling.  No loss of bowel or bladder incontinence. Daughter at bedside.

## 2025-03-07 NOTE — ED PROVIDER NOTE - NSFOLLOWUPINSTRUCTIONS_ED_ALL_ED_FT
Please continue Tylenol as needed every 6 hours for pain. If more severe, add Tramadol 50mg every 8 hours as needed (for pain). To help with muscle soreness and tightness (Tizanidine 2mg every 8 hours as needed). If ineffective, after 1-2 hours, okay to take a second tablet. You may take Tizanidine 4mg every 8 hours after that if the 2nd tablet is helpful.   Apply the lidocaine patch to the area where you feel the most pain.   Use a warm compress or heating pad throughout the day intermittently to help relax the muscles as well.     1) Follow up with your doctor in 1-2 days  2) Return to the ER for worsening or concerning symptoms

## 2025-03-07 NOTE — ED ADULT NURSE NOTE - OBJECTIVE STATEMENT
Pt presents to the ED with c/o right sided back and hip pain. States she tried Tylenol/Motrin, Biofreeze, and warm compresses with minimal relief. Was recently at [facility] ED s/p accidental fall 2 days ago at a restaurant, tripped on a step. +blood thinner use.  Pt has ecchymosis noted to B/L eyes, and laceration noted to the left upper forehead with staples placed at [place]. Pt is A&Ox4, ambulatory with a stand by assist. Denies chest pain, SOB, palpitations, H/A. Safety precautions maintained call bell within reach.

## 2025-03-07 NOTE — ED PROVIDER NOTE - PHYSICAL EXAMINATION
Gen:  Noted bruising to the face.  Resp: No distress   Ext: no deformities, ranging right hip fully. No limitations. No midline spinal tenderness. Tender at the right iliac crest/sacral ala.   Skin: warm and dry as visualized , no bruising to abdomen, flank, hip or buttock.

## 2025-03-07 NOTE — ED ADULT NURSE NOTE - CHIEF COMPLAINT QUOTE
Pt c/o right sided back pain that is causing her to be unable to sleep. Recently seen in the ER two days ago after a trip and fall. On Eliquis.

## 2025-03-07 NOTE — ED PROVIDER NOTE - PATIENT PORTAL LINK FT
You can access the FollowMyHealth Patient Portal offered by [facility] by registering at the following website: [email or web address]. By joining [facility]’s FollowMyHealth portal, you will also be able to view your health information using other applications (apps) compatible with our system.

## 2025-03-07 NOTE — ED PROVIDER NOTE - CLINICAL SUMMARY MEDICAL DECISION MAKING FREE TEXT BOX
Patient chart reviewed from [facility] ([id number]).    85-year-old female with history of DVT/PE on Eliquis, hyperlipidemia, macular degeneration presents to the emergency department after a fall 2 days ago.  Patient was walking with her granddaughter reports she tripped over a curb falling forward.  She tried to brace her fall with her hands but had head strike without LOC.  Patient was evaluated at [facility].  Discharged with Tylenol.  Patient states she did not have right lower back pain above the hip until afterward.  She denies dizziness, neck pain, chest pain, shortness of breath, abdominal pain, numbness, vision changes. Reports that she had Tylenol however cannot sleep due to pain.  Asking for something to help relax the muscle spasms.  No numbness tingling.  No loss of bowel or bladder incontinence. Daughter at bedside    Ranging right hip well. Pt stiffened with changing of positions (ie: sitting to standing, standing to supine, supine to sitting). No local bruising. No abd tenderness.    Daughter states they do have an approaching appt with spine specialist on Monday.

## 2025-03-07 NOTE — ED ADULT NURSE NOTE - CADM POA PRESS ULCER
Problem: OXYGENATION/RESPIRATORY FUNCTION  Goal: Patient will maintain patent airway  Outcome: Ongoing    Goal: Patient will achieve/maintain normal respiratory rate/effort  Respiratory rate and effort will be within normal limits for the patient   Outcome: Ongoing      Problem: MECHANICAL VENTILATION  Goal: Patient will maintain patent airway  Outcome: Ongoing    Goal: Oral health is maintained or improved  Outcome: Ongoing    Goal: Tracheostomy will be managed safely  Outcome: Ongoing    Goal: Ability to express needs and understand communication  Outcome: Ongoing    Goal: Mobility/activity is maintained at optimum level for patient  Outcome: Ongoing      Problem: SKIN INTEGRITY  Goal: Skin integrity is maintained or improved  Outcome: Ongoing      Problem: RESPIRATORY  Intervention: Respiratory assessment  BRONCHOSPASM/BRONCHOCONSTRICTION     [x]         IMPROVE AERATION/BREATH SOUNDS  [x]   ADMINISTER BRONCHODILATOR THERAPY AS APPROPRIATE  [x]   ASSESS BREATH SOUNDS  [x]   IMPLEMENT AEROSOL/MDI PROTOCOL  [x]   PATIENT EDUCATION AS NEEDED No

## 2025-03-10 ENCOUNTER — APPOINTMENT (OUTPATIENT)
Age: 85
End: 2025-03-10
Payer: MEDICARE

## 2025-03-10 VITALS — BODY MASS INDEX: 27.94 KG/M2 | HEIGHT: 61 IN | WEIGHT: 148 LBS

## 2025-03-10 PROCEDURE — 99204 OFFICE O/P NEW MOD 45 MIN: CPT

## 2025-03-10 RX ORDER — TIZANIDINE HYDROCHLORIDE 2 MG/1
2 CAPSULE ORAL
Qty: 15 | Refills: 0 | Status: ACTIVE | COMMUNITY
Start: 2025-03-07

## 2025-03-10 RX ORDER — TRAMADOL HYDROCHLORIDE 50 MG/1
50 TABLET, COATED ORAL
Qty: 15 | Refills: 0 | Status: ACTIVE | COMMUNITY
Start: 2025-03-07

## 2025-03-10 RX ORDER — HYDROCODONE BITARTRATE AND ACETAMINOPHEN 5; 325 MG/1; MG/1
5-325 TABLET ORAL
Qty: 20 | Refills: 0 | Status: ACTIVE | COMMUNITY
Start: 2025-03-10 | End: 1900-01-01

## 2025-03-11 ENCOUNTER — APPOINTMENT (OUTPATIENT)
Dept: FAMILY MEDICINE | Facility: CLINIC | Age: 85
End: 2025-03-11
Payer: MEDICARE

## 2025-03-11 VITALS
OXYGEN SATURATION: 95 % | SYSTOLIC BLOOD PRESSURE: 154 MMHG | HEART RATE: 81 BPM | DIASTOLIC BLOOD PRESSURE: 74 MMHG | TEMPERATURE: 98.1 F | RESPIRATION RATE: 16 BRPM

## 2025-03-11 DIAGNOSIS — S01.81XA LACERATION W/OUT FOREIGN BODY OF OTHER PART OF HEAD, INITIAL ENCOUNTER: ICD-10-CM

## 2025-03-11 DIAGNOSIS — M54.50 LOW BACK PAIN, UNSPECIFIED: ICD-10-CM

## 2025-03-11 PROCEDURE — G2211 COMPLEX E/M VISIT ADD ON: CPT

## 2025-03-11 PROCEDURE — 15853 REMOVAL SUTR/STAPL XREQ ANES: CPT

## 2025-03-11 PROCEDURE — 99214 OFFICE O/P EST MOD 30 MIN: CPT

## 2025-03-11 NOTE — CHART NOTE - NSCHARTNOTEFT_GEN_A_CORE
85 y o female presented to the ED on 03/07 with trip and fall as per chart.  Per HIE, the patient has the recommended primary care follow up appointment scheduled on 03/11 with Dr. Shama Hathaway.

## 2025-03-13 ENCOUNTER — INPATIENT (INPATIENT)
Facility: HOSPITAL | Age: 85
LOS: 5 days | Discharge: HOME | DRG: 552 | End: 2025-03-19
Attending: INTERNAL MEDICINE | Admitting: INTERNAL MEDICINE
Payer: MEDICARE

## 2025-03-13 VITALS
TEMPERATURE: 97 F | RESPIRATION RATE: 18 BRPM | SYSTOLIC BLOOD PRESSURE: 156 MMHG | HEIGHT: 62 IN | OXYGEN SATURATION: 98 % | HEART RATE: 81 BPM | WEIGHT: 147.05 LBS | DIASTOLIC BLOOD PRESSURE: 72 MMHG

## 2025-03-13 DIAGNOSIS — Z90.710 ACQUIRED ABSENCE OF BOTH CERVIX AND UTERUS: Chronic | ICD-10-CM

## 2025-03-13 DIAGNOSIS — S32.000A WEDGE COMPRESSION FRACTURE OF UNSPECIFIED LUMBAR VERTEBRA, INITIAL ENCOUNTER FOR CLOSED FRACTURE: ICD-10-CM

## 2025-03-13 LAB
ALBUMIN SERPL ELPH-MCNC: 3.5 G/DL — SIGNIFICANT CHANGE UP (ref 3.3–5)
ALP SERPL-CCNC: 101 U/L — SIGNIFICANT CHANGE UP (ref 40–120)
ALT FLD-CCNC: 33 U/L — SIGNIFICANT CHANGE UP (ref 10–45)
ANION GAP SERPL CALC-SCNC: 9 MMOL/L — SIGNIFICANT CHANGE UP (ref 5–17)
APPEARANCE UR: CLEAR — SIGNIFICANT CHANGE UP
AST SERPL-CCNC: 39 U/L — SIGNIFICANT CHANGE UP (ref 10–40)
BASOPHILS # BLD AUTO: 0.05 K/UL — SIGNIFICANT CHANGE UP (ref 0–0.2)
BASOPHILS NFR BLD AUTO: 0.7 % — SIGNIFICANT CHANGE UP (ref 0–2)
BILIRUB SERPL-MCNC: 0.5 MG/DL — SIGNIFICANT CHANGE UP (ref 0.2–1.2)
BILIRUB UR-MCNC: NEGATIVE — SIGNIFICANT CHANGE UP
BUN SERPL-MCNC: 14 MG/DL — SIGNIFICANT CHANGE UP (ref 7–23)
CALCIUM SERPL-MCNC: 9.3 MG/DL — SIGNIFICANT CHANGE UP (ref 8.4–10.5)
CHLORIDE SERPL-SCNC: 103 MMOL/L — SIGNIFICANT CHANGE UP (ref 96–108)
CO2 SERPL-SCNC: 27 MMOL/L — SIGNIFICANT CHANGE UP (ref 22–31)
COLOR SPEC: YELLOW — SIGNIFICANT CHANGE UP
CREAT SERPL-MCNC: 0.52 MG/DL — SIGNIFICANT CHANGE UP (ref 0.5–1.3)
DIFF PNL FLD: NEGATIVE — SIGNIFICANT CHANGE UP
EGFR: 92 ML/MIN/1.73M2 — SIGNIFICANT CHANGE UP
EGFR: 92 ML/MIN/1.73M2 — SIGNIFICANT CHANGE UP
EOSINOPHIL # BLD AUTO: 0.15 K/UL — SIGNIFICANT CHANGE UP (ref 0–0.5)
EOSINOPHIL NFR BLD AUTO: 2 % — SIGNIFICANT CHANGE UP (ref 0–6)
GLUCOSE SERPL-MCNC: 134 MG/DL — HIGH (ref 70–99)
GLUCOSE UR QL: NEGATIVE MG/DL — SIGNIFICANT CHANGE UP
HCT VFR BLD CALC: 35.7 % — SIGNIFICANT CHANGE UP (ref 34.5–45)
HGB BLD-MCNC: 12.3 G/DL — SIGNIFICANT CHANGE UP (ref 11.5–15.5)
IMM GRANULOCYTES NFR BLD AUTO: 0.3 % — SIGNIFICANT CHANGE UP (ref 0–0.9)
KETONES UR-MCNC: NEGATIVE MG/DL — SIGNIFICANT CHANGE UP
LEUKOCYTE ESTERASE UR-ACNC: ABNORMAL
LIDOCAIN IGE QN: 41 U/L — SIGNIFICANT CHANGE UP (ref 16–77)
LYMPHOCYTES # BLD AUTO: 1.28 K/UL — SIGNIFICANT CHANGE UP (ref 1–3.3)
MCHC RBC-ENTMCNC: 31.2 PG — SIGNIFICANT CHANGE UP (ref 27–34)
MCHC RBC-ENTMCNC: 34.5 G/DL — SIGNIFICANT CHANGE UP (ref 32–36)
MCV RBC AUTO: 90.6 FL — SIGNIFICANT CHANGE UP (ref 80–100)
MONOCYTES # BLD AUTO: 0.87 K/UL — SIGNIFICANT CHANGE UP (ref 0–0.9)
MONOCYTES NFR BLD AUTO: 11.4 % — SIGNIFICANT CHANGE UP (ref 2–14)
NEUTROPHILS # BLD AUTO: 5.23 K/UL — SIGNIFICANT CHANGE UP (ref 1.8–7.4)
NEUTROPHILS NFR BLD AUTO: 68.8 % — SIGNIFICANT CHANGE UP (ref 43–77)
NITRITE UR-MCNC: POSITIVE
NRBC BLD AUTO-RTO: 0 /100 WBCS — SIGNIFICANT CHANGE UP (ref 0–0)
PH UR: 6 — SIGNIFICANT CHANGE UP (ref 5–8)
PLATELET # BLD AUTO: 289 K/UL — SIGNIFICANT CHANGE UP (ref 150–400)
POTASSIUM SERPL-MCNC: 3.8 MMOL/L — SIGNIFICANT CHANGE UP (ref 3.5–5.3)
POTASSIUM SERPL-SCNC: 3.8 MMOL/L — SIGNIFICANT CHANGE UP (ref 3.5–5.3)
PROT SERPL-MCNC: 7.4 G/DL — SIGNIFICANT CHANGE UP (ref 6–8.3)
PROT UR-MCNC: NEGATIVE MG/DL — SIGNIFICANT CHANGE UP
RBC # BLD: 3.94 M/UL — SIGNIFICANT CHANGE UP (ref 3.8–5.2)
RBC # FLD: 12.1 % — SIGNIFICANT CHANGE UP (ref 10.3–14.5)
SODIUM SERPL-SCNC: 139 MMOL/L — SIGNIFICANT CHANGE UP (ref 135–145)
SP GR SPEC: 1.01 — SIGNIFICANT CHANGE UP (ref 1–1.03)
UROBILINOGEN FLD QL: 1 MG/DL — SIGNIFICANT CHANGE UP (ref 0.2–1)
WBC # BLD: 7.6 K/UL — SIGNIFICANT CHANGE UP (ref 3.8–10.5)
WBC # FLD AUTO: 7.6 K/UL — SIGNIFICANT CHANGE UP (ref 3.8–10.5)

## 2025-03-13 PROCEDURE — 99223 1ST HOSP IP/OBS HIGH 75: CPT | Mod: GC

## 2025-03-13 PROCEDURE — 74177 CT ABD & PELVIS W/CONTRAST: CPT | Mod: 26

## 2025-03-13 PROCEDURE — 99285 EMERGENCY DEPT VISIT HI MDM: CPT

## 2025-03-13 PROCEDURE — 71045 X-RAY EXAM CHEST 1 VIEW: CPT | Mod: 26

## 2025-03-13 PROCEDURE — 93010 ELECTROCARDIOGRAM REPORT: CPT

## 2025-03-13 RX ORDER — CEFUROXIME SODIUM 1.5 G
500 VIAL (EA) INJECTION EVERY 12 HOURS
Refills: 0 | Status: DISCONTINUED | OUTPATIENT
Start: 2025-03-13 | End: 2025-03-16

## 2025-03-13 RX ORDER — MELATONIN 5 MG
3 TABLET ORAL AT BEDTIME
Refills: 0 | Status: DISCONTINUED | OUTPATIENT
Start: 2025-03-13 | End: 2025-03-19

## 2025-03-13 RX ORDER — APIXABAN 2.5 MG/1
1 TABLET, FILM COATED ORAL
Refills: 0 | DISCHARGE

## 2025-03-13 RX ORDER — MAGNESIUM, ALUMINUM HYDROXIDE 200-200 MG
30 TABLET,CHEWABLE ORAL EVERY 4 HOURS
Refills: 0 | Status: DISCONTINUED | OUTPATIENT
Start: 2025-03-13 | End: 2025-03-19

## 2025-03-13 RX ORDER — ATORVASTATIN CALCIUM 80 MG/1
20 TABLET, FILM COATED ORAL AT BEDTIME
Refills: 0 | Status: DISCONTINUED | OUTPATIENT
Start: 2025-03-13 | End: 2025-03-19

## 2025-03-13 RX ORDER — OXYCODONE HYDROCHLORIDE 30 MG/1
5 TABLET ORAL EVERY 6 HOURS
Refills: 0 | Status: DISCONTINUED | OUTPATIENT
Start: 2025-03-13 | End: 2025-03-17

## 2025-03-13 RX ORDER — POLYETHYLENE GLYCOL 3350 17 G/17G
17 POWDER, FOR SOLUTION ORAL DAILY
Refills: 0 | Status: DISCONTINUED | OUTPATIENT
Start: 2025-03-13 | End: 2025-03-15

## 2025-03-13 RX ORDER — MIRABEGRON 50 MG/1
1 TABLET, FILM COATED, EXTENDED RELEASE ORAL
Refills: 0 | DISCHARGE

## 2025-03-13 RX ORDER — CEFUROXIME SODIUM 1.5 G
500 VIAL (EA) INJECTION ONCE
Refills: 0 | Status: COMPLETED | OUTPATIENT
Start: 2025-03-13 | End: 2025-03-13

## 2025-03-13 RX ORDER — ONDANSETRON HCL/PF 4 MG/2 ML
4 VIAL (ML) INJECTION EVERY 8 HOURS
Refills: 0 | Status: DISCONTINUED | OUTPATIENT
Start: 2025-03-13 | End: 2025-03-19

## 2025-03-13 RX ORDER — ACETAMINOPHEN 500 MG/5ML
975 LIQUID (ML) ORAL EVERY 8 HOURS
Refills: 0 | Status: DISCONTINUED | OUTPATIENT
Start: 2025-03-13 | End: 2025-03-17

## 2025-03-13 RX ORDER — LIDOCAINE HYDROCHLORIDE 20 MG/ML
1 JELLY TOPICAL EVERY 24 HOURS
Refills: 0 | Status: DISCONTINUED | OUTPATIENT
Start: 2025-03-13 | End: 2025-03-19

## 2025-03-13 RX ORDER — LOSARTAN POTASSIUM 100 MG/1
50 TABLET, FILM COATED ORAL DAILY
Refills: 0 | Status: DISCONTINUED | OUTPATIENT
Start: 2025-03-13 | End: 2025-03-19

## 2025-03-13 RX ORDER — INFLUENZA A VIRUS A/IDAHO/07/2018 (H1N1) ANTIGEN (MDCK CELL DERIVED, PROPIOLACTONE INACTIVATED, INFLUENZA A VIRUS A/INDIANA/08/2018 (H3N2) ANTIGEN (MDCK CELL DERIVED, PROPIOLACTONE INACTIVATED), INFLUENZA B VIRUS B/SINGAPORE/INFTT-16-0610/2016 ANTIGEN (MDCK CELL DERIVED, PROPIOLACTONE INACTIVATED), INFLUENZA B VIRUS B/IOWA/06/2017 ANTIGEN (MDCK CELL DERIVED, PROPIOLACTONE INACTIVATED) 15; 15; 15; 15 UG/.5ML; UG/.5ML; UG/.5ML; UG/.5ML
0.5 INJECTION, SUSPENSION INTRAMUSCULAR ONCE
Refills: 0 | Status: DISCONTINUED | OUTPATIENT
Start: 2025-03-13 | End: 2025-03-19

## 2025-03-13 RX ORDER — CANDESARTAN CILEXETIL 8 MG/1
1 TABLET ORAL
Refills: 0 | DISCHARGE

## 2025-03-13 RX ORDER — CYCLOBENZAPRINE HYDROCHLORIDE 15 MG/1
5 CAPSULE, EXTENDED RELEASE ORAL THREE TIMES A DAY
Refills: 0 | Status: DISCONTINUED | OUTPATIENT
Start: 2025-03-13 | End: 2025-03-17

## 2025-03-13 RX ORDER — SERTRALINE 100 MG/1
150 TABLET, FILM COATED ORAL DAILY
Refills: 0 | Status: DISCONTINUED | OUTPATIENT
Start: 2025-03-13 | End: 2025-03-19

## 2025-03-13 RX ORDER — DILTIAZEM HYDROCHLORIDE 240 MG/1
180 TABLET, EXTENDED RELEASE ORAL DAILY
Refills: 0 | Status: DISCONTINUED | OUTPATIENT
Start: 2025-03-13 | End: 2025-03-19

## 2025-03-13 RX ORDER — LISINOPRIL 30 MG/1
100 TABLET ORAL DAILY
Refills: 0 | Status: DISCONTINUED | OUTPATIENT
Start: 2025-03-13 | End: 2025-03-19

## 2025-03-13 RX ORDER — SENNA 187 MG
1 TABLET ORAL DAILY
Refills: 0 | Status: DISCONTINUED | OUTPATIENT
Start: 2025-03-13 | End: 2025-03-15

## 2025-03-13 RX ORDER — MIRABEGRON 50 MG/1
25 TABLET, FILM COATED, EXTENDED RELEASE ORAL DAILY
Refills: 0 | Status: DISCONTINUED | OUTPATIENT
Start: 2025-03-13 | End: 2025-03-19

## 2025-03-13 RX ORDER — DILTIAZEM HYDROCHLORIDE 240 MG/1
1 TABLET, EXTENDED RELEASE ORAL
Refills: 0 | DISCHARGE

## 2025-03-13 RX ORDER — APIXABAN 2.5 MG/1
2.5 TABLET, FILM COATED ORAL EVERY 12 HOURS
Refills: 0 | Status: DISCONTINUED | OUTPATIENT
Start: 2025-03-13 | End: 2025-03-19

## 2025-03-13 RX ORDER — ACETAMINOPHEN 500 MG/5ML
650 LIQUID (ML) ORAL EVERY 6 HOURS
Refills: 0 | Status: DISCONTINUED | OUTPATIENT
Start: 2025-03-13 | End: 2025-03-13

## 2025-03-13 RX ORDER — OXYCODONE HYDROCHLORIDE 30 MG/1
10 TABLET ORAL EVERY 6 HOURS
Refills: 0 | Status: DISCONTINUED | OUTPATIENT
Start: 2025-03-13 | End: 2025-03-17

## 2025-03-13 RX ORDER — OXYCODONE HYDROCHLORIDE 30 MG/1
10 TABLET ORAL EVERY 6 HOURS
Refills: 0 | Status: DISCONTINUED | OUTPATIENT
Start: 2025-03-13 | End: 2025-03-13

## 2025-03-13 RX ADMIN — MIRABEGRON 25 MILLIGRAM(S): 50 TABLET, FILM COATED, EXTENDED RELEASE ORAL at 12:38

## 2025-03-13 RX ADMIN — OXYCODONE HYDROCHLORIDE 5 MILLIGRAM(S): 30 TABLET ORAL at 21:10

## 2025-03-13 RX ADMIN — CYCLOBENZAPRINE HYDROCHLORIDE 5 MILLIGRAM(S): 15 CAPSULE, EXTENDED RELEASE ORAL at 22:05

## 2025-03-13 RX ADMIN — DILTIAZEM HYDROCHLORIDE 180 MILLIGRAM(S): 240 TABLET, EXTENDED RELEASE ORAL at 05:20

## 2025-03-13 RX ADMIN — OXYCODONE HYDROCHLORIDE 5 MILLIGRAM(S): 30 TABLET ORAL at 20:40

## 2025-03-13 RX ADMIN — APIXABAN 2.5 MILLIGRAM(S): 2.5 TABLET, FILM COATED ORAL at 17:30

## 2025-03-13 RX ADMIN — LIDOCAINE HYDROCHLORIDE 1 PATCH: 20 JELLY TOPICAL at 09:00

## 2025-03-13 RX ADMIN — LIDOCAINE HYDROCHLORIDE 1 PATCH: 20 JELLY TOPICAL at 21:00

## 2025-03-13 RX ADMIN — LOSARTAN POTASSIUM 50 MILLIGRAM(S): 100 TABLET, FILM COATED ORAL at 05:20

## 2025-03-13 RX ADMIN — Medication 975 MILLIGRAM(S): at 14:30

## 2025-03-13 RX ADMIN — Medication 500 MILLILITER(S): at 03:00

## 2025-03-13 RX ADMIN — Medication 975 MILLIGRAM(S): at 22:05

## 2025-03-13 RX ADMIN — APIXABAN 2.5 MILLIGRAM(S): 2.5 TABLET, FILM COATED ORAL at 05:19

## 2025-03-13 RX ADMIN — OXYCODONE HYDROCHLORIDE 10 MILLIGRAM(S): 30 TABLET ORAL at 18:02

## 2025-03-13 RX ADMIN — CYCLOBENZAPRINE HYDROCHLORIDE 5 MILLIGRAM(S): 15 CAPSULE, EXTENDED RELEASE ORAL at 14:31

## 2025-03-13 RX ADMIN — Medication 1000 MILLILITER(S): at 01:41

## 2025-03-13 RX ADMIN — Medication 500 MILLIGRAM(S): at 02:55

## 2025-03-13 RX ADMIN — POLYETHYLENE GLYCOL 3350 17 GRAM(S): 17 POWDER, FOR SOLUTION ORAL at 12:04

## 2025-03-13 RX ADMIN — Medication 975 MILLIGRAM(S): at 22:35

## 2025-03-13 RX ADMIN — LISINOPRIL 100 MILLIGRAM(S): 30 TABLET ORAL at 22:05

## 2025-03-13 RX ADMIN — OXYCODONE HYDROCHLORIDE 10 MILLIGRAM(S): 30 TABLET ORAL at 18:32

## 2025-03-13 RX ADMIN — SERTRALINE 150 MILLIGRAM(S): 100 TABLET, FILM COATED ORAL at 22:06

## 2025-03-13 RX ADMIN — LIDOCAINE HYDROCHLORIDE 1 PATCH: 20 JELLY TOPICAL at 19:00

## 2025-03-13 RX ADMIN — Medication 4 MILLIGRAM(S): at 05:21

## 2025-03-13 RX ADMIN — Medication 500 MILLIGRAM(S): at 05:19

## 2025-03-13 RX ADMIN — CYCLOBENZAPRINE HYDROCHLORIDE 5 MILLIGRAM(S): 15 CAPSULE, EXTENDED RELEASE ORAL at 05:20

## 2025-03-13 RX ADMIN — Medication 500 MILLIGRAM(S): at 17:30

## 2025-03-13 RX ADMIN — ATORVASTATIN CALCIUM 20 MILLIGRAM(S): 80 TABLET, FILM COATED ORAL at 22:05

## 2025-03-13 NOTE — PROGRESS NOTE ADULT - SUBJECTIVE AND OBJECTIVE BOX
Interval History  Patient seen and examined at bedside.  Interval History  Patient seen and examined at bedside. No acute events noted.  Patient reports improvement of back pain with morphine. No radicular symptoms. No LE weakness/numbness/tingling. No urinary/bowel dysfunction.    ALLERGIES:  No Known Drug Allergies  Seafood (Unknown)    MEDICATIONS  (STANDING):  acetaminophen     Tablet .. 975 milliGRAM(s) Oral every 8 hours  apixaban 2.5 milliGRAM(s) Oral every 12 hours  atenolol  Tablet 100 milliGRAM(s) Oral daily  atorvastatin 20 milliGRAM(s) Oral at bedtime  cefuroxime   Tablet 500 milliGRAM(s) Oral every 12 hours  cyclobenzaprine 5 milliGRAM(s) Oral three times a day  diltiazem    milliGRAM(s) Oral daily  influenza  Vaccine (HIGH DOSE) 0.5 milliLiter(s) IntraMuscular once  lidocaine   4% Patch 1 Patch Transdermal every 24 hours  losartan 50 milliGRAM(s) Oral daily  mirabegron ER 25 milliGRAM(s) Oral daily  pantoprazole    Tablet 40 milliGRAM(s) Oral before breakfast  polyethylene glycol 3350 17 Gram(s) Oral daily  senna 1 Tablet(s) Oral daily  sertraline 150 milliGRAM(s) Oral daily    MEDICATIONS  (PRN):  aluminum hydroxide/magnesium hydroxide/simethicone Suspension 30 milliLiter(s) Oral every 4 hours PRN Dyspepsia  melatonin 3 milliGRAM(s) Oral at bedtime PRN Insomnia  morphine  - Injectable 4 milliGRAM(s) IV Push every 8 hours PRN Severe Pain (7 - 10)  ondansetron Injectable 4 milliGRAM(s) IV Push every 8 hours PRN Nausea and/or Vomiting  oxyCODONE    IR 10 milliGRAM(s) Oral every 6 hours PRN Moderate Pain (4 - 6)    Vital Signs Last 24 Hrs  T(F): 97.9 (13 Mar 2025 06:09), Max: 98.4 (13 Mar 2025 05:15)  HR: 73 (13 Mar 2025 06:09) (73 - 81)  BP: 149/70 (13 Mar 2025 06:09) (148/74 - 156/72)  RR: 18 (13 Mar 2025 06:09) (18 - 18)  SpO2: 94% (13 Mar 2025 06:09) (94% - 98%)  I&O's Summary    BMI (kg/m2): 26.9 (25 @ 12:26)    PHYSICAL EXAM:  GENERAL: NAD  HEENT: Facial ecchymoses - resolving, forehead abrasion  CHEST/LUNG: Clear to percussion bilaterally; No rales, rhonchi, wheezing  HEART: Regular rate and rhythm  ABDOMEN: Soft, Nontender, Nondistended; Bowel sounds present  MUSCULOSKELETAL/EXTREMITIES:  2+ Peripheral Pulses, No LE edema  Right lower back paraspinal muscle tenderness on palpation, +vertebral tenderness in lower thoracic/upper lumbar region.   PSYCH: Appropriate affect  NEURO: Alert & Oriented x 3, 5/5 LE strength    I personally reviewed the below data/images/labs:    LABS:                        12.3   7.60  )-----------( 289      ( 13 Mar 2025 01:29 )             35.7           139  |  103  |  14  ----------------------------<  134  3.8   |  27  |  0.52    Ca    9.3      13 Mar 2025 01:29    TPro  7.4  /  Alb  3.5  /  TBili  0.5  /  DBili  x   /  AST  39  /  ALT  33  /  AlkPhos  101  03-13    Urinalysis Basic - ( 13 Mar 2025 01:29 )    Color: Yellow / Appearance: Clear / S.011 / pH: x  Gluc: 134 mg/dL / Ketone: Negative mg/dL  / Bili: Negative / Urobili: 1.0 mg/dL   Blood: x / Protein: Negative mg/dL / Nitrite: Positive   Leuk Esterase: Large / RBC: 0 /HPF / WBC 12 /HPF   Sq Epi: x / Non Sq Epi: x / Bacteria: Many /HPF    Consultant(s) Notes Reviewed:   Care Discussed with Consultants/Other Providers:  Imaging Personally Reviewed:

## 2025-03-13 NOTE — H&P ADULT - NSHPREVIEWOFSYSTEMS_GEN_ALL_CORE
CONSTITUTIONAL: No weakness, fevers or chills  EYES/ENT: No visual changes;  No vertigo or throat pain   NECK: No pain or stiffness  RESPIRATORY: No cough, wheezing, hemoptysis; No shortness of breath  CARDIOVASCULAR: No chest pain or palpitations  GASTROINTESTINAL: +constipation No abdominal or epigastric pain. No nausea, vomiting, or hematemesis; No diarrhea. No melena or hematochezia.  GENITOURINARY: +frequency. No dysuria or hematuria  NEUROLOGICAL: No numbness or weakness  BACK: +low back pain   SKIN: No itching, rashes

## 2025-03-13 NOTE — PATIENT PROFILE ADULT - FUNCTIONAL SCREEN CURRENT LEVEL: COMMUNICATION, MLM
From: Barbara Delcid  To: Juan Herrera  Sent: 2/17/2022 11:28 AM CST  Subject: Becoming more painful    The spot I messaged you about two days ago is increasingly painful.  Maybe wasn't a seborrheic keratosis after all, since it is following his frequent cancer occurrences with respect to pain.  Please advise.  Thanks.    Dr. Herrera-  Patient had an inflammed SK treated on his left later upper forehead on 2/10. Patient is concerned that his spot is increasingly painful when he touches it. It is white and hard. He denies drainage, swelling, increased redness. He is adamant that the spot is not healing and the white hardness is not a scab. He states that it feels like his previous skin cancers. He would like to be seen sooner. Please advise if there is a time the patient can be seen.      0 = understands/communicates without difficulty

## 2025-03-13 NOTE — ED PROVIDER NOTE - PHYSICAL EXAMINATION
exam:   General: well appearing, NAD.   HEENT: eyes perrl, nose normal, ecchymosis across cheeks/nasal bridge/ forehead.  cor: RRR, s1s2, 2+rad pulses.   lungs: ctabl, no resp distress.   abd: soft, ntnd.   neuro: a&ox3, cn2-12 intact, HINTON, 5/5 strength c nl sensation all extremities, nl coordination. 5/5 strength EHL and plantar flexion 1st toe. normal distal LE senstaion.   MSK: no c/t/L spine tenderness. no focal back tenderness. no back discoloration. nontender pelvis/hips, low back pain elicited with SLR LLE  Skin: normal, no rash

## 2025-03-13 NOTE — ED PROVIDER NOTE - PRINCIPAL DIAGNOSIS
----- Message from Pacheco Jin MD sent at 10/20/2021  2:25 PM EDT -----  Please call the patient regarding negative covid   Lumbar compression fracture

## 2025-03-13 NOTE — PHYSICAL THERAPY INITIAL EVALUATION ADULT - PERTINENT HX OF CURRENT PROBLEM, REHAB EVAL
85F PMHx significant for hypertension, GERD, DVT (on Eliquis), complaining of low back pain after a fall on March 4th. Patient is being admitted for the management of the following:  #Intractable back pain 2/2 fall  #UTI

## 2025-03-13 NOTE — CONSULT NOTE ADULT - SUBJECTIVE AND OBJECTIVE BOX
85yF was admitted on     CC: Patient is a 85y old  Female who presents with a chief complaint of back pain      HPI:  85F PMHx significant for HTN, HLD,, GERD, DVT (on Eliquis), complaining of low back pain after a fall on . The patient was walking in the street with her granddaughter when she tripped on a curb and fell forward, hitting her florentino against the ground. Patient was taken to [facility],  where she had a CT head and C-spine maxillofacial which were unremarkable.  She had L-spine x-rays for back pain then which did not show any fracture.  They recommended she take Tylenol for pain but came back to [facility] on  for worsening low back pain and was given tramadol and tizanidine, which helped mildly but still did not control pain. She saw spine surgeon, Dr. You, 3 days ago who switched her to Hydrocodone/Tylenol 5/325.  Her primary doctor told her she could increase hydrocodone to 1 every 6 hours due to still uncontrolled pain. She had an MRI L-spine prior to coming to the ED, results are pending.  Patient describes the pain as a sharp in that starts on the R side of the low back and radiates to the left low back. Denies any numbness/tingling in legs. Rates the pain a 4/10 on the pain scale during encounter. States the pain is worse with movement. Of note, EMS gave patient 100 mcg of fentanyl prior to coming in. CT abdomen and pelvis with mild osteoporotic compression fractures of T12 and L1. PM&E consulted for rehab recommendations. (13 Mar 2025 04:27)      Imaging performed:  CT abdomen/pelvis 3/13- Compared to the prior study of 2024, interval development of mild osteoporotic compression fractures of T12 and L1. Slight retropulsion of bone causes mild narrowing of the right aspect of the spinal canal at L1. No evidence of acute traumatic injury to the abdomen or pelvis.  Chest x ray 3/13-Patchy left midlung zone opacity    REVIEW OF SYSTEMS  Constitutional - No fever,  +fatigue  HEENT - No eye pain, No visual disturbances, No difficulty hearing  Respiratory - No cough, No wheezing, No shortness of breath  Cardiovascular - No chest pain, No palpitations  Gastrointestinal + constipation  Genitourinary +urinary frequency  Neurological - No headaches, No memory loss, +loss of strength  Musculoskeletal +back pain    VITALS  T(C): 36.6 (25 @ 06:09), Max: 36.9 (25 @ 05:15)  HR: 73 (25 @ 06:09) (73 - 81)  BP: 149/70 (25 @ 06:09) (148/74 - 156/72)  RR: 18 (25 @ 06:09) (18 - 18)  SpO2: 94% (25 @ 06:09) (94% - 98%)    PAST MEDICAL & SURGICAL HISTORY  HTN (hypertension)    GERD (gastroesophageal reflux disease)    Mitral valve prolapse    S/P hysterectomy      FUNCTIONAL HISTORY  Lives with daughter in a house 3 steps to enter, 1st floor setup  Independent prior    CURRENT FUNCTIONAL STATUS  pending therapy evaluations    SOCIAL HISTORY - as per documentation/history  Smoking - None  EtOH - None  Drugs - None    FAMILY HISTORY   FH: CAD (coronary artery disease)    FH: type 2 diabetes (Sibling)        RECENT LABS - Reviewed  CBC Full  -  ( 13 Mar 2025 01:29 )  WBC Count : 7.60 K/uL  RBC Count : 3.94 M/uL  Hemoglobin : 12.3 g/dL  Hematocrit : 35.7 %  Platelet Count - Automated : 289 K/uL  Mean Cell Volume : 90.6 fl  Mean Cell Hemoglobin : 31.2 pg  Mean Cell Hemoglobin Concentration : 34.5 g/dL  Auto Neutrophil # : x  Auto Lymphocyte # : x  Auto Monocyte # : x  Auto Eosinophil # : x  Auto Basophil # : x  Auto Neutrophil % : x  Auto Lymphocyte % : x  Auto Monocyte % : x  Auto Eosinophil % : x  Auto Basophil % : x        139  |  103  |  14  ----------------------------<  134[H]  3.8   |  27  |  0.52    Ca    9.3      13 Mar 2025 01:29    TPro  7.4  /  Alb  3.5  /  TBili  0.5  /  DBili  x   /  AST  39  /  ALT  33  /  AlkPhos  101  03-13    Urinalysis Basic - ( 13 Mar 2025 01:29 )    Color: Yellow / Appearance: Clear / S.011 / pH: x  Gluc: 134 mg/dL / Ketone: Negative mg/dL  / Bili: Negative / Urobili: 1.0 mg/dL   Blood: x / Protein: Negative mg/dL / Nitrite: Positive   Leuk Esterase: Large / RBC: 0 /HPF / WBC 12 /HPF   Sq Epi: x / Non Sq Epi: x / Bacteria: Many /HPF        ALLERGIES  No Known Drug Allergies  Seafood (Unknown)      MEDICATIONS   acetaminophen     Tablet .. 975 milliGRAM(s) Oral every 8 hours  aluminum hydroxide/magnesium hydroxide/simethicone Suspension 30 milliLiter(s) Oral every 4 hours PRN  apixaban 2.5 milliGRAM(s) Oral every 12 hours  atenolol  Tablet 100 milliGRAM(s) Oral daily  atorvastatin 20 milliGRAM(s) Oral at bedtime  cefuroxime   Tablet 500 milliGRAM(s) Oral every 12 hours  cyclobenzaprine 5 milliGRAM(s) Oral three times a day  diltiazem    milliGRAM(s) Oral daily  influenza  Vaccine (HIGH DOSE) 0.5 milliLiter(s) IntraMuscular once  lidocaine   4% Patch 1 Patch Transdermal every 24 hours  losartan 50 milliGRAM(s) Oral daily  melatonin 3 milliGRAM(s) Oral at bedtime PRN  mirabegron ER 25 milliGRAM(s) Oral daily  morphine  - Injectable 4 milliGRAM(s) IV Push every 8 hours PRN  ondansetron Injectable 4 milliGRAM(s) IV Push every 8 hours PRN  oxyCODONE    IR 10 milliGRAM(s) Oral every 6 hours PRN  pantoprazole    Tablet 40 milliGRAM(s) Oral before breakfast  polyethylene glycol 3350 17 Gram(s) Oral daily  senna 1 Tablet(s) Oral daily  sertraline 150 milliGRAM(s) Oral daily      ----------------------------------------------------------------------------------------  PHYSICAL EXAM  Constitutional - appears uncomfortable  HEENT - Ecchymosis throughout face   Neck - Supple, No limited ROM  Chest - Breathing comfortably, No wheezing  Cardiovascular - S1S2   Abdomen - mildly distended  Extremities -No calf tenderness   Neurologic Exam -                    Cognitive - AAO to self, place, year, situation     Communication - Fluent, No dysarthria     Cranial Nerves - CN 2-12 intact     Motor -                     LEFT    UE - ShAB 5/5, EF 5/5, EE 5/5, WE 5/5,  5/5                    RIGHT UE - ShAB 5/5, EF 5/5, EE 5/5, WE 5/5,  5/5                    LEFT    LE - HF 4/5, KE 4/5, DF 5/5, PF 5/5                    RIGHT LE - HF 4/5, KE 4/5, DF 5/5, PF 5/5     Psychiatric - Mood stable, Affect WNL  ----------------------------------------------------------------------------------------  ASSESSMENT/PLAN  85 year old female admitted s/p fall with back pain  Back pain- CT with mild osteoporotic compression fractures of T12 and L1, pending orthopedic evaluation  UTI- Cefuroxime   HTN- Atenolol, Cardizem, Losartan  - Mybetriq  Mood- Zoloft   DVT ppx- Eliquis  Pain - Tylenol, Flexeril, Lidoderm patch, Morphine, Oxycodone  Rehab - Pending PT/OT evaluations. Rehab recommendations to follow pending progress with therapies. Will continue to follow and current recommendations may change if functional progress changes. Recommend ongoing mobilization by staff to maintain cardiopulmonary function and prevention of secondary complications related to debility. Discussed with rehab team.  85yF was admitted on     CC: Patient is a 85y old  Female who presents with a chief complaint of back pain      HPI:  85F PMHx significant for HTN, HLD,, GERD, DVT (on Eliquis), complaining of low back pain after a fall on . The patient was walking in the street with her granddaughter when she tripped on a curb and fell forward, hitting her florentino against the ground. Patient was taken to [facility],  where she had a CT head and C-spine maxillofacial which were unremarkable.  She had L-spine x-rays for back pain then which did not show any fracture.  They recommended she take Tylenol for pain but came back to [facility] on  for worsening low back pain and was given tramadol and tizanidine, which helped mildly but still did not control pain. She saw spine surgeon, Dr. You, 3 days ago who switched her to Hydrocodone/Tylenol 5/325.  Her primary doctor told her she could increase hydrocodone to 1 every 6 hours due to still uncontrolled pain. She had an MRI L-spine prior to coming to the ED, results are pending.  Patient describes the pain as a sharp in that starts on the R side of the low back and radiates to the left low back. Denies any numbness/tingling in legs. Rates the pain a 4/10 on the pain scale during encounter. States the pain is worse with movement. Of note, EMS gave patient 100 mcg of fentanyl prior to coming in. CT abdomen and pelvis with mild osteoporotic compression fractures of T12 and L1. PM&R consulted for rehab recommendations.      Imaging performed:  CT abdomen/pelvis 3/13- Compared to the prior study of 2024, interval development of mild osteoporotic compression fractures of T12 and L1. Slight retropulsion of bone causes mild narrowing of the right aspect of the spinal canal at L1. No evidence of acute traumatic injury to the abdomen or pelvis.  Chest x ray 3/13-Patchy left midlung zone opacity    REVIEW OF SYSTEMS  Constitutional - No fever,  +fatigue  HEENT - No eye pain, No visual disturbances, No difficulty hearing  Respiratory - No cough, No wheezing, No shortness of breath  Cardiovascular - No chest pain, No palpitations  Gastrointestinal + constipation  Genitourinary +urinary frequency  Neurological - No headaches, No memory loss, +loss of strength  Musculoskeletal +back pain    VITALS  T(C): 36.6 (25 @ 06:09), Max: 36.9 (25 @ 05:15)  HR: 73 (25 @ 06:09) (73 - 81)  BP: 149/70 (25 @ 06:09) (148/74 - 156/72)  RR: 18 (25 @ 06:09) (18 - 18)  SpO2: 94% (25 @ 06:09) (94% - 98%)    PAST MEDICAL & SURGICAL HISTORY  HTN (hypertension)    GERD (gastroesophageal reflux disease)    Mitral valve prolapse    S/P hysterectomy      FUNCTIONAL HISTORY  Lives with daughter in a house 3 steps to enter, 1st floor setup  Independent prior    CURRENT FUNCTIONAL STATUS  pending therapy evaluations    SOCIAL HISTORY - as per documentation/history  Smoking - None  EtOH - None  Drugs - None    FAMILY HISTORY   FH: CAD (coronary artery disease)    FH: type 2 diabetes (Sibling)        RECENT LABS - Reviewed  CBC Full  -  ( 13 Mar 2025 01:29 )  WBC Count : 7.60 K/uL  RBC Count : 3.94 M/uL  Hemoglobin : 12.3 g/dL  Hematocrit : 35.7 %  Platelet Count - Automated : 289 K/uL  Mean Cell Volume : 90.6 fl  Mean Cell Hemoglobin : 31.2 pg  Mean Cell Hemoglobin Concentration : 34.5 g/dL  Auto Neutrophil # : x  Auto Lymphocyte # : x  Auto Monocyte # : x  Auto Eosinophil # : x  Auto Basophil # : x  Auto Neutrophil % : x  Auto Lymphocyte % : x  Auto Monocyte % : x  Auto Eosinophil % : x  Auto Basophil % : x        139  |  103  |  14  ----------------------------<  134[H]  3.8   |  27  |  0.52    Ca    9.3      13 Mar 2025 01:29    TPro  7.4  /  Alb  3.5  /  TBili  0.5  /  DBili  x   /  AST  39  /  ALT  33  /  AlkPhos  101  03-13    Urinalysis Basic - ( 13 Mar 2025 01:29 )    Color: Yellow / Appearance: Clear / S.011 / pH: x  Gluc: 134 mg/dL / Ketone: Negative mg/dL  / Bili: Negative / Urobili: 1.0 mg/dL   Blood: x / Protein: Negative mg/dL / Nitrite: Positive   Leuk Esterase: Large / RBC: 0 /HPF / WBC 12 /HPF   Sq Epi: x / Non Sq Epi: x / Bacteria: Many /HPF        ALLERGIES  No Known Drug Allergies  Seafood (Unknown)      MEDICATIONS   acetaminophen     Tablet .. 975 milliGRAM(s) Oral every 8 hours  aluminum hydroxide/magnesium hydroxide/simethicone Suspension 30 milliLiter(s) Oral every 4 hours PRN  apixaban 2.5 milliGRAM(s) Oral every 12 hours  atenolol  Tablet 100 milliGRAM(s) Oral daily  atorvastatin 20 milliGRAM(s) Oral at bedtime  cefuroxime   Tablet 500 milliGRAM(s) Oral every 12 hours  cyclobenzaprine 5 milliGRAM(s) Oral three times a day  diltiazem    milliGRAM(s) Oral daily  influenza  Vaccine (HIGH DOSE) 0.5 milliLiter(s) IntraMuscular once  lidocaine   4% Patch 1 Patch Transdermal every 24 hours  losartan 50 milliGRAM(s) Oral daily  melatonin 3 milliGRAM(s) Oral at bedtime PRN  mirabegron ER 25 milliGRAM(s) Oral daily  morphine  - Injectable 4 milliGRAM(s) IV Push every 8 hours PRN  ondansetron Injectable 4 milliGRAM(s) IV Push every 8 hours PRN  oxyCODONE    IR 10 milliGRAM(s) Oral every 6 hours PRN  pantoprazole    Tablet 40 milliGRAM(s) Oral before breakfast  polyethylene glycol 3350 17 Gram(s) Oral daily  senna 1 Tablet(s) Oral daily  sertraline 150 milliGRAM(s) Oral daily      ----------------------------------------------------------------------------------------  PHYSICAL EXAM  Constitutional - appears uncomfortable  HEENT - Ecchymosis throughout face   Neck - Supple, No limited ROM  Chest - Breathing comfortably, No wheezing  Cardiovascular - S1S2   Abdomen - mildly distended  Extremities -No calf tenderness   Neurologic Exam -                    Cognitive - AAO to self, place, year, situation     Communication - Fluent, No dysarthria     Cranial Nerves - CN 2-12 intact     Motor -                     LEFT    UE - ShAB 5/5, EF 5/5, EE 5/5, WE 5/5,  5/5                    RIGHT UE - ShAB 5/5, EF 5/5, EE 5/5, WE 5/5,  5/5                    LEFT    LE - HF 4/5, KE 4/5, DF 5/5, PF 5/5                    RIGHT LE - HF 4/5, KE 4/5, DF 5/5, PF 5/5     Psychiatric - Mood stable, Affect WNL  ----------------------------------------------------------------------------------------  ASSESSMENT/PLAN  85 year old female admitted s/p fall with back pain  Back pain- CT with mild osteoporotic compression fractures of T12 and L1, pending orthopedic evaluation  UTI- Cefuroxime   HTN- Atenolol, Cardizem, Losartan  - Mybetriq  Mood- Zoloft   DVT ppx- Eliquis  Pain - Tylenol, Flexeril, Lidoderm patch, Morphine, Oxycodone  Rehab - Pending PT/OT evaluations. Rehab recommendations to follow pending progress with therapies. Will continue to follow and current recommendations may change if functional progress changes. Recommend ongoing mobilization by staff to maintain cardiopulmonary function and prevention of secondary complications related to debility. Discussed with rehab team.

## 2025-03-13 NOTE — PATIENT PROFILE ADULT - FUNCTIONAL ASSESSMENT - BASIC MOBILITY 6.
4-calculated by average/Not able to assess (calculate score using [facility] averaging method)

## 2025-03-13 NOTE — ED PROVIDER NOTE - OBJECTIVE STATEMENT
85-year-old female with history of hypertension, GERD, DVT, taking Eliquis, complaining of right low back pain getting worse over the past week.  There is no radiation down her legs.  She has also some mild pain to the left lower back.  No dysuria hematuria or frequency.  No abdominal pain.  No leg weakness numbness tingling.  She fell on March 4 face forward and was evaluated at [facility].  She had a CT head C-spine maxillofacial which were unremarkable.  She had L-spine x-rays for back pain then which did not show any fracture.  She was taking Tylenol for pain after discharge and returned here March 7 for uncontrolled low back pains.  She was prescribed tramadol and tizanidine which helped mildly but still did not control pain.  She saw spine surgeon Dr. You 3 days ago who switched her to hydrocodone/Tylenol 5/325 1 every 8 hour.  She had an MRI L-spine today and is pending results.  Her primary doctor told her she could increase hydrocodone to 1 every 6 hours due to still uncontrolled pain.  Patient having worse pain tonight and could not get up.  EMS gave patient 100 mcg of fentanyl with improvement and afterwards patient able to ambulate.  Patient denies any fevers or chills.  No chest pain or shortness of breath.  No dizziness.

## 2025-03-13 NOTE — H&P ADULT - NSHPSOCIALHISTORY_GEN_ALL_CORE
Patient is a former smoker, quit at 51 years of age. Drinks alcohol occasionally. Patient ambulates independently at baseline.

## 2025-03-13 NOTE — H&P ADULT - NSICDXFAMILYHX_GEN_ALL_CORE_FT
FAMILY HISTORY:  FH: CAD (coronary artery disease)    Sibling  Still living? Unknown  FH: type 2 diabetes, Age at diagnosis: Age Unknown

## 2025-03-13 NOTE — H&P ADULT - HISTORY OF PRESENT ILLNESS
85F PMHx significant for HTN, HLD,, GERD, DVT (on Eliquis), complaining of low back pain after a fall on March 4th. The patient was walking in the street with her granddaughter when she tripped on a curb and fell forward, hitting her florentino against the ground. Patient was taken to [facility],  where she had a CT head and C-spine maxillofacial which were unremarkable.  She had L-spine x-rays for back pain then which did not show any fracture.  They recommended she take Tylenol for pain but came back to [facility] on March 7th for worsening low back pain and was given tramadol and tizanidine, which helped mildly but still did not control pain. She saw spine surgeon, Dr. You, 3 days ago who switched her to Hydrocodone/Tylenol 5/325.  Her primary doctor told her she could increase hydrocodone to 1 every 6 hours due to still uncontrolled pain. She had an MRI L-spine prior to coming to the ED, results are pending.  Patient describes the pain as a sharp in that starts on the R side of the low back and radiates to the left low back. Denies any numbness/tingling in legs. Rates the pain a 4/10 on the pain scale during encounter. States the pain is worse with movement. Of note, EMS gave patient 100 mcg of fentanyl prior to coming in.    In ED, patient's vitals WNL. Labs were significant for a UA positive for LE, Nitrites bacteria. CT AP: mild osteoporotic compression fractures of T12 and L1. Slight retropulsion of bone causes mild narrowing of the right aspect of the spinal canal at L1.No evidence of acute traumatic injury to the abdomen or pelvis.  Patient was given Ceftin, Percocet and 1L NS.

## 2025-03-13 NOTE — ED ADULT NURSE NOTE - OBJECTIVE STATEMENT
Patient received A&Ox4, ambulatory with steady gait at the present. Patient complains of back pain. Patient is s/p trip and fall on a step x9 days ago. Patient with negative XR and CT, pending MRI results. Patient called 911 as she had no relief after tramadol and oxycodone. Patient arrives with multiple areas of ecchymosis noted to face and bilateral eyes from original injury x9 days ago. Repair of laceration above L eyebrow stitches noted and intact. Side rails up, call light in reach, safety maintained, comfort measures provided and MD evaluation in progress.

## 2025-03-13 NOTE — H&P ADULT - ATTENDING COMMENTS
I have personally seen and examined patient on the above date.  I discussed the case with Dr Cruz and I agree with findings and plan as detailed per note above, which I have amended where appropriate.    Superseding the above.  85F hx of HTN, HLD, PE/DVT on Eliquis s/p [facility] fall 3/4/2025 with facial bruising and severe low back pain and was on tizanidine and tramadol with no relief. Had seen spine surgeon and prescribed Hydocodone q8 with no relief and increased to q6hr by PMD with still persistent intractable pain. Had MRI earlier today results pending. s/p 100mcg Fentanyl by EMS with some relief. s/p 2 percocets in ED with still uncontrolled pain. CTAP with T12 and L1 compression fxs. Labs sig for UTI.   PE:  +facial bruising  CV:S1S2, RRR, no mgr  CL: CTA bl   Abd; soft NT, ND +BS. + paraspinal lumbar spasms. +vertebral tenderness in lower thoracic/upper lumbar region.   Ext: bl knee bruising. no edema.   Labs:   wnl  UA with +LE+nit WBC 12 many bacteria.   EKG: personally rev.  NSR at 82bpm no acute ST changes.   CXR: personally rev. NAPD  CTAP:   IMPRESSION:    Compared to the prior study of October 24, 2024, interval development of   mild osteoporotic compression fractures of T12 and L1. Slight   retropulsion of bone causes mild narrowing of the right aspect of the   spinal canal at L1.  No evidence of acute traumatic injury to the abdomen or pelvis.    AP; 85F hx of HTN, HLD, PE/DVT on Eliquis s/p [facility] fall 3/4/2025 with intractable back pain with T12 L1 compression fxs  #Compression fxs  ortho consult  cont muscle relaxants. Flexeril 5mg tid  cont oxycodone 10mg q6 prn for mod pain and Morphine 4mg IV q8 prn for severe pain  bowel regimen  DVT proph - on Eliquis.   #UTI   cont cefuroxime 500mg bid.  FU urine cx.   #HTN   cont BP meds  #HLD  cont statin   #depression  cont zoloft.     [facility] rev  D/W Dr Houser ED.

## 2025-03-13 NOTE — PROGRESS NOTE ADULT - ASSESSMENT
85F PMHx significant for hypertension, GERD, DVT (on Eliquis), complaining of low back pain after a fall on March 4th. Patient is being admitted for the management of the following:    #Intractable Back s/p Mechanical Fall 3/4  #Subacute T12 and L1 Compression Fracture  #Suspect Osteopenia/Osteoporosis  -CT abd/pelvis showed The bones are diffusely osteopenic. Chronic degenerative changes of the spine. Mild loss of height of the T12 and L1 vertebral bodies indicative of recent compression fracture. There is slight retropulsion of the L1 vertebral body resulting in mild retropulsion into the right aspect of the spinal canal.  -MRI L Spine 3/12 from [facility] ([phone]) obtained showed: Superior and inferior T12 endplate depressions. Broad based superior L1 endplate depression. Approximately 20% loss of central vertebral body height at both levels. Mild bony retropulsion of the superior L1 endplate, impressing upon the thecal sac. AT both of these levels, there is mild indistinctly marginated low T1, bright STIR signal within the compressed marrow suggestive of marrow edema indicating that this may both be of subacute age.   -WBAT  -Fall precaution   -Pain control: Trial of tylenol 975mg Q8H, lidocaine patch, flexeril 5mg TID, oxycodone 5mg Q6H PRN for moderate pain and oxycodone 10mg Q6H for severe pain + bowel regimen  -Spoke with Dr. Diaz (Neurosurgeon), no surgical intervention warranted at this time, recommended TLSO brace, pain control and PT. If pain is uncontrolled, can consider transfer for kyphoplasty   -  -PT/OT/PMR     #Acute UTI  #Overactive Bladder   -UA on admission: large LE, positive nitrite, 12 WBC  -s/p Ceftin in ED  -Continue Ceftin 500mg BID (day 2)  -Follow up UCx pending   -Continue home medication Mirabegron     #Abnormal CXR Findings  -CXR showed Patchy left midlung zone opacity. Patient is asymptomatic, no cough/SOB/hypoxia  -Repeat CXR on discharge     #Hx of DVT  -Continue Eliquis 2.5mg BID     #HTN  -Continue Atenolol, Diltiazem, Losartan   -Monitor BP and HR     #HLD  -Continue Lipitor     #GERD  -Continue PPI     #Depression  -Continue zoloft     #DVT ppx  -Eliquis     Plan of care discussed with patient, unable to reach her daughter Terra over the phone for update.   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 Patient is being admitted for the management of the following:    #Intractable Back s/p Mechanical Fall 3/4  #Subacute T12 and L1 Compression Fracture  #Suspect Osteopenia/Osteoporosis  -CT abd/pelvis showed The bones are diffusely osteopenic. Chronic degenerative changes of the spine. Mild loss of height of the T12 and L1 vertebral bodies indicative of recent compression fracture. There is slight retropulsion of the L1 vertebral body resulting in mild retropulsion into the right aspect of the spinal canal.  -MRI L Spine 3/12 from [facility] ([id number]) obtained showed: Superior and inferior T12 endplate depressions. Broad based superior L1 endplate depression. Approximately 20% loss of central vertebral body height at both levels. Mild bony retropulsion of the superior L1 endplate, impressing upon the thecal sac. AT both of these levels, there is mild indistinctly marginated low T1, bright STIR signal within the compressed marrow suggestive of marrow edema indicating that this may both be of subacute age.   -WBAT  -Fall precaution   -Pain control: Trial of tylenol 975mg Q8H, lidocaine patch, flexeril 5mg TID, oxycodone 5mg Q6H PRN for moderate pain and oxycodone 10mg Q6H for severe pain + bowel regimen  -Spoke with Dr. Diaz (Neurosurgeon), no surgical intervention warranted at this time, recommended TLSO brace, pain control and PT. If pain is uncontrolled, can consider transfer for kyphoplasty   -PT/OT/PMR eval  -Outpatient follow up with PMD for treatment for osteoporosis     #Acute UTI  #Overactive Bladder   -UA on admission: large LE, positive nitrite, 12 WBC  -s/p Ceftin in ED  -Continue Ceftin 500mg BID (day 2)  -Follow up UCx pending   -Continue home medication Mirabegron     #Abnormal CXR Findings  -CXR showed Patchy left midlung zone opacity. Patient is asymptomatic, no cough/SOB/hypoxia  -Repeat CXR with PMD    #Hx of DVT  -Continue Eliquis 2.5mg BID     #HTN  -Continue Atenolol, Diltiazem, Losartan   -Monitor BP and HR     #HLD  -Continue Lipitor     #GERD  -Continue PPI     #Depression  -Continue zoloft     #History of DVT/PE  -On Eliquis     #DVT ppx  -Eliquis     Plan of care discussed with patient, unable to reach her daughter Terra over the phone for update.   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 Patient is being admitted for the management of the following:    #Intractable Back s/p Mechanical Fall 3/4  #Subacute T12 and L1 Compression Fracture  #Suspect Osteopenia/Osteoporosis  -CT abd/pelvis showed The bones are diffusely osteopenic. Chronic degenerative changes of the spine. Mild loss of height of the T12 and L1 vertebral bodies indicative of recent compression fracture. There is slight retropulsion of the L1 vertebral body resulting in mild retropulsion into the right aspect of the spinal canal.  -MRI L Spine 3/12 from [facility] ([id number]) obtained showed: Superior and inferior T12 endplate depressions. Broad based superior L1 endplate depression. Approximately 20% loss of central vertebral body height at both levels. Mild bony retropulsion of the superior L1 endplate, impressing upon the thecal sac. AT both of these levels, there is mild indistinctly marginated low T1, bright STIR signal within the compressed marrow suggestive of marrow edema indicating that this may both be of subacute age.   -WBAT  -Fall precaution   -Pain control: Trial of tylenol 975mg Q8H, lidocaine patch, flexeril 5mg TID, oxycodone 5mg Q6H PRN for moderate pain and oxycodone 10mg Q6H for severe pain + bowel regimen  -Spoke with Dr. Diaz (Neurosurgeon), no surgical intervention warranted at this time, recommended TLSO brace, pain control and PT. If pain is uncontrolled, can consider transfer for kyphoplasty   -PT/OT/PMR eval  -Outpatient follow up with PMD for treatment for osteoporosis     #Acute UTI  #Overactive Bladder   -UA on admission: large LE, positive nitrite, 12 WBC  -s/p Ceftin in ED  -Continue Ceftin 500mg BID (day 2)  -Follow up UCx pending   -Continue home medication Mirabegron     #Abnormal CXR Findings  -CXR showed Patchy left midlung zone opacity. Patient is asymptomatic, no cough/SOB/hypoxia  -Repeat CXR with PMD    #Hx of DVT  -Continue Eliquis 2.5mg BID     #HTN  -Continue Atenolol, Diltiazem, Losartan   -Monitor BP and HR     #HLD  -Continue Lipitor     #GERD  -Continue PPI     #Depression  -Continue zoloft     #History of DVT/PE  -On Eliquis     #DVT ppx  -Eliquis     Plan of care discussed with patient and patient's daughter Terra at bedside, they are in agreement, all questions were answered

## 2025-03-13 NOTE — PHYSICAL THERAPY INITIAL EVALUATION ADULT - ADDITIONAL COMMENTS
Pt lives with her daughter in a private home, 2 steps to enter, first floor set up. Owns a rollator, but does not use it at baseline.

## 2025-03-13 NOTE — H&P ADULT - ASSESSMENT
85F PMHx significant for hypertension, GERD, DVT (on Eliquis), complaining of low back pain after a fall on March 4th. Patient is being admitted for the management of the following:    #Intractable back pain 2/2 fall  -CTAP: compression fractures of T12 and L1.  -s/p Percocet in ED   -c/w Oxycodone 10mg Q6 PRN for moderate pain  -c/w Morphine 4mg IV Q8 for severe pain  -Flexeril 5mg TID  -PT/OT consult  -PMR consult  -Ortho consult    #UTI  -s/p Ceftin in ED  -c/w Ceftin 500mg BID   -c/w home medication Mirabegron   -UCx pending     #Constipation  -Miralax and senna daily     #Hx of DVT  -c/w Eliquis 2.5mg BID     #Hx of HTN  -c/w Atenolol, Diltiazem, Losartan     #Hx of HLD  -c/w Lipitor     #Hx of GERD  -c/w famotidine     #Hx of depression  -c/w Zoloft     #DVT ppx  -Eliquis     Plan discussed with daughter. Patient and daughter in agreement with care plan.    Case dw Dr. Zeng

## 2025-03-13 NOTE — ED PROVIDER NOTE - CLINICAL SUMMARY MEDICAL DECISION MAKING FREE TEXT BOX
85-year-old female with history of hypertension, GERD, DVT, taking Eliquis, complaining of right low back pain getting worse over the past week.  There is no radiation down her legs.  She has also some mild pain to the left lower back.  No dysuria hematuria or frequency.  No abdominal pain.  No leg weakness numbness tingling.  She fell on March 4 face forward and was evaluated at [facility].  She had a CT head C-spine maxillofacial which were unremarkable.  She had L-spine x-rays for back pain then which did not show any fracture.  She was taking Tylenol for pain after discharge and returned here March 7 for uncontrolled low back pains.  She was prescribed tramadol and tizanidine which helped mildly but still did not control pain.  She saw spine surgeon Dr. You 3 days ago who switched her to hydrocodone/Tylenol 5/325 1 every 8 hour.  She had an MRI L-spine today and is pending results.  Her primary doctor told her she could increase hydrocodone to 1 every 6 hours due to still uncontrolled pain.  Patient having worse pain tonight and could not get up.  EMS gave patient 100 mcg of fentanyl with improvement and afterwards patient able to ambulate.  Patient denies any fevers or chills.  No chest pain or shortness of breath.  No dizziness.    Patient appears in no distress.  Vitals unremarkable.  She has no midline spinal tenderness to suggest a spinal fracture.  Her lower back is overall nontender.  Back pain is elicited with sitting up and reclining/position change.  Seems most consistent with muscular pain.  Partial DDx: Low back strain, compression fracture, herniated disc, less likely renal/liver injury, pyelonephritis.  Will check labs, CT abdomen pelvis, urine.  Patient may just need improved pain management.  May need to double dose of hydrocodone . will try 2 percocets here. 85-year-old female with history of hypertension, GERD, DVT, taking Eliquis, complaining of right low back pain getting worse over the past week.  There is no radiation down her legs.  She has also some mild pain to the left lower back.  No dysuria hematuria or frequency.  No abdominal pain.  No leg weakness numbness tingling.  She fell on March 4 face forward and was evaluated at [facility].  She had a CT head C-spine maxillofacial which were unremarkable.  She had L-spine x-rays for back pain then which did not show any fracture.  She was taking Tylenol for pain after discharge and returned here March 7 for uncontrolled low back pains.  She was prescribed tramadol and tizanidine which helped mildly but still did not control pain.  She saw spine surgeon Dr. You 3 days ago who switched her to hydrocodone/Tylenol 5/325 1 every 8 hour.  She had an MRI L-spine today and is pending results.  Her primary doctor told her she could increase hydrocodone to 1 every 6 hours due to still uncontrolled pain.  Patient having worse pain tonight and could not get up.  EMS gave patient 100 mcg of fentanyl with improvement and afterwards patient able to ambulate.  Patient denies any fevers or chills.  No chest pain or shortness of breath.  No dizziness.    Patient appears in no distress.  Vitals unremarkable.  She has no midline spinal tenderness to suggest a spinal fracture.  Her lower back is overall nontender.  Back pain is elicited with sitting up and reclining/position change.  Seems most consistent with muscular pain.  Partial DDx: Low back strain, compression fracture, herniated disc, less likely renal/liver injury, pyelonephritis.  Will check labs, CT abdomen pelvis, urine.  Patient may just need improved pain management.  May need to double dose of hydrocodone . will try 2 percocets here.    Update: Labs unremarkable except urine positive for UTI.  Started patient on Ceftin.  Patient CT shows compression fractures of T12 and L1, new compared to October CT.  Patient has some pain relief with 2 Percocets however still has significant pain on movement.  Severe pain when shifting back-and-forth from CAT scan. will admit for pain control. case d/w attending hospitalist Dr Zeng, accepting admission

## 2025-03-13 NOTE — CONSULT NOTE ADULT - TIME BILLING
initial consult-- face to face time encounter and counseling the patient, care coordination, reviewing chart and data-including but not limited to labs and imaging, discussion with rehab team.

## 2025-03-13 NOTE — ED ADULT TRIAGE NOTE - CHIEF COMPLAINT QUOTE
Pt BIBEMS s/p trip and fall x10 days ago, presenting with persistent back pain unrelieved with tramadol or oxycodone. Received 100mcg Fentanyl IV with relief. Negative XR and CT, pending MRI results.

## 2025-03-13 NOTE — H&P ADULT - NSHPPHYSICALEXAM_GEN_ALL_CORE
GENERAL: NAD, lying in bed comfortably  HEAD: +healing lesion on L florentino, ecchymosis over b/l eyes and cheeks. normocephalic  EYES: EOMI  NECK: Supple, trachea midline, no JVD  HEART: Regular rate and rhythm  LUNGS: Unlabored respirations.  Clear to auscultation bilaterally, no crackles, wheezing, or rhonchi  ABDOMEN: Soft, nontender, nondistended, +BS  BACK: tenderness to palpation at T12  EXTREMITIES: +vernicose veins b/l LE. 2+ peripheral pulses bilaterally. No clubbing, cyanosis, or edema  NERVOUS SYSTEM:  A&Ox3, moving all extremities, no focal deficits

## 2025-03-14 LAB
ALBUMIN SERPL ELPH-MCNC: 3.2 G/DL — LOW (ref 3.3–5)
ALP SERPL-CCNC: 99 U/L — SIGNIFICANT CHANGE UP (ref 40–120)
ALT FLD-CCNC: 29 U/L — SIGNIFICANT CHANGE UP (ref 10–45)
ANION GAP SERPL CALC-SCNC: 9 MMOL/L — SIGNIFICANT CHANGE UP (ref 5–17)
AST SERPL-CCNC: 30 U/L — SIGNIFICANT CHANGE UP (ref 10–40)
BASOPHILS # BLD AUTO: 0.06 K/UL — SIGNIFICANT CHANGE UP (ref 0–0.2)
BASOPHILS NFR BLD AUTO: 0.9 % — SIGNIFICANT CHANGE UP (ref 0–2)
BILIRUB SERPL-MCNC: 0.4 MG/DL — SIGNIFICANT CHANGE UP (ref 0.2–1.2)
BUN SERPL-MCNC: 10 MG/DL — SIGNIFICANT CHANGE UP (ref 7–23)
CALCIUM SERPL-MCNC: 9.1 MG/DL — SIGNIFICANT CHANGE UP (ref 8.4–10.5)
CHLORIDE SERPL-SCNC: 105 MMOL/L — SIGNIFICANT CHANGE UP (ref 96–108)
CHOLEST SERPL-MCNC: 138 MG/DL — SIGNIFICANT CHANGE UP
CO2 SERPL-SCNC: 27 MMOL/L — SIGNIFICANT CHANGE UP (ref 22–31)
CREAT SERPL-MCNC: 0.54 MG/DL — SIGNIFICANT CHANGE UP (ref 0.5–1.3)
EGFR: 91 ML/MIN/1.73M2 — SIGNIFICANT CHANGE UP
EGFR: 91 ML/MIN/1.73M2 — SIGNIFICANT CHANGE UP
EOSINOPHIL # BLD AUTO: 0.23 K/UL — SIGNIFICANT CHANGE UP (ref 0–0.5)
EOSINOPHIL NFR BLD AUTO: 3.5 % — SIGNIFICANT CHANGE UP (ref 0–6)
GLUCOSE SERPL-MCNC: 114 MG/DL — HIGH (ref 70–99)
HCT VFR BLD CALC: 36.5 % — SIGNIFICANT CHANGE UP (ref 34.5–45)
HDLC SERPL-MCNC: 41 MG/DL — LOW
HGB BLD-MCNC: 12.2 G/DL — SIGNIFICANT CHANGE UP (ref 11.5–15.5)
IMM GRANULOCYTES NFR BLD AUTO: 0.5 % — SIGNIFICANT CHANGE UP (ref 0–0.9)
LIPID PNL WITH DIRECT LDL SERPL: 76 MG/DL — SIGNIFICANT CHANGE UP
LYMPHOCYTES # BLD AUTO: 1.24 K/UL — SIGNIFICANT CHANGE UP (ref 1–3.3)
LYMPHOCYTES # BLD AUTO: 18.6 % — SIGNIFICANT CHANGE UP (ref 13–44)
MCHC RBC-ENTMCNC: 30.8 PG — SIGNIFICANT CHANGE UP (ref 27–34)
MCHC RBC-ENTMCNC: 33.4 G/DL — SIGNIFICANT CHANGE UP (ref 32–36)
MCV RBC AUTO: 92.2 FL — SIGNIFICANT CHANGE UP (ref 80–100)
MONOCYTES # BLD AUTO: 0.83 K/UL — SIGNIFICANT CHANGE UP (ref 0–0.9)
MONOCYTES NFR BLD AUTO: 12.5 % — SIGNIFICANT CHANGE UP (ref 2–14)
NEUTROPHILS # BLD AUTO: 4.26 K/UL — SIGNIFICANT CHANGE UP (ref 1.8–7.4)
NEUTROPHILS NFR BLD AUTO: 64 % — SIGNIFICANT CHANGE UP (ref 43–77)
NON HDL CHOLESTEROL: 97 MG/DL — SIGNIFICANT CHANGE UP
NRBC BLD AUTO-RTO: 0 /100 WBCS — SIGNIFICANT CHANGE UP (ref 0–0)
PLATELET # BLD AUTO: 282 K/UL — SIGNIFICANT CHANGE UP (ref 150–400)
POTASSIUM SERPL-MCNC: 3.6 MMOL/L — SIGNIFICANT CHANGE UP (ref 3.5–5.3)
POTASSIUM SERPL-SCNC: 3.6 MMOL/L — SIGNIFICANT CHANGE UP (ref 3.5–5.3)
PROT SERPL-MCNC: 6.9 G/DL — SIGNIFICANT CHANGE UP (ref 6–8.3)
RBC # BLD: 3.96 M/UL — SIGNIFICANT CHANGE UP (ref 3.8–5.2)
RBC # FLD: 12.5 % — SIGNIFICANT CHANGE UP (ref 10.3–14.5)
SODIUM SERPL-SCNC: 141 MMOL/L — SIGNIFICANT CHANGE UP (ref 135–145)
TRIGL SERPL-MCNC: 119 MG/DL — SIGNIFICANT CHANGE UP
WBC # BLD: 6.65 K/UL — SIGNIFICANT CHANGE UP (ref 3.8–10.5)
WBC # FLD AUTO: 6.65 K/UL — SIGNIFICANT CHANGE UP (ref 3.8–10.5)

## 2025-03-14 PROCEDURE — 99233 SBSQ HOSP IP/OBS HIGH 50: CPT

## 2025-03-14 RX ADMIN — OXYCODONE HYDROCHLORIDE 5 MILLIGRAM(S): 30 TABLET ORAL at 13:00

## 2025-03-14 RX ADMIN — SERTRALINE 150 MILLIGRAM(S): 100 TABLET, FILM COATED ORAL at 12:13

## 2025-03-14 RX ADMIN — ATORVASTATIN CALCIUM 20 MILLIGRAM(S): 80 TABLET, FILM COATED ORAL at 21:11

## 2025-03-14 RX ADMIN — Medication 975 MILLIGRAM(S): at 13:25

## 2025-03-14 RX ADMIN — Medication 40 MILLIGRAM(S): at 05:11

## 2025-03-14 RX ADMIN — Medication 1 TABLET(S): at 12:12

## 2025-03-14 RX ADMIN — OXYCODONE HYDROCHLORIDE 5 MILLIGRAM(S): 30 TABLET ORAL at 21:10

## 2025-03-14 RX ADMIN — Medication 975 MILLIGRAM(S): at 22:10

## 2025-03-14 RX ADMIN — POLYETHYLENE GLYCOL 3350 17 GRAM(S): 17 POWDER, FOR SOLUTION ORAL at 12:12

## 2025-03-14 RX ADMIN — Medication 975 MILLIGRAM(S): at 05:11

## 2025-03-14 RX ADMIN — OXYCODONE HYDROCHLORIDE 10 MILLIGRAM(S): 30 TABLET ORAL at 05:44

## 2025-03-14 RX ADMIN — APIXABAN 2.5 MILLIGRAM(S): 2.5 TABLET, FILM COATED ORAL at 17:33

## 2025-03-14 RX ADMIN — Medication 975 MILLIGRAM(S): at 05:45

## 2025-03-14 RX ADMIN — Medication 500 MILLIGRAM(S): at 17:33

## 2025-03-14 RX ADMIN — MIRABEGRON 25 MILLIGRAM(S): 50 TABLET, FILM COATED, EXTENDED RELEASE ORAL at 12:12

## 2025-03-14 RX ADMIN — OXYCODONE HYDROCHLORIDE 5 MILLIGRAM(S): 30 TABLET ORAL at 20:14

## 2025-03-14 RX ADMIN — CYCLOBENZAPRINE HYDROCHLORIDE 5 MILLIGRAM(S): 15 CAPSULE, EXTENDED RELEASE ORAL at 05:10

## 2025-03-14 RX ADMIN — OXYCODONE HYDROCHLORIDE 10 MILLIGRAM(S): 30 TABLET ORAL at 06:15

## 2025-03-14 RX ADMIN — Medication 975 MILLIGRAM(S): at 21:11

## 2025-03-14 RX ADMIN — LIDOCAINE HYDROCHLORIDE 1 PATCH: 20 JELLY TOPICAL at 19:00

## 2025-03-14 RX ADMIN — LIDOCAINE HYDROCHLORIDE 1 PATCH: 20 JELLY TOPICAL at 22:00

## 2025-03-14 RX ADMIN — DILTIAZEM HYDROCHLORIDE 180 MILLIGRAM(S): 240 TABLET, EXTENDED RELEASE ORAL at 05:10

## 2025-03-14 RX ADMIN — CYCLOBENZAPRINE HYDROCHLORIDE 5 MILLIGRAM(S): 15 CAPSULE, EXTENDED RELEASE ORAL at 13:26

## 2025-03-14 RX ADMIN — Medication 500 MILLIGRAM(S): at 05:11

## 2025-03-14 RX ADMIN — OXYCODONE HYDROCHLORIDE 5 MILLIGRAM(S): 30 TABLET ORAL at 12:22

## 2025-03-14 RX ADMIN — LISINOPRIL 100 MILLIGRAM(S): 30 TABLET ORAL at 05:11

## 2025-03-14 RX ADMIN — CYCLOBENZAPRINE HYDROCHLORIDE 5 MILLIGRAM(S): 15 CAPSULE, EXTENDED RELEASE ORAL at 21:11

## 2025-03-14 RX ADMIN — LOSARTAN POTASSIUM 50 MILLIGRAM(S): 100 TABLET, FILM COATED ORAL at 05:10

## 2025-03-14 RX ADMIN — LIDOCAINE HYDROCHLORIDE 1 PATCH: 20 JELLY TOPICAL at 10:13

## 2025-03-14 RX ADMIN — APIXABAN 2.5 MILLIGRAM(S): 2.5 TABLET, FILM COATED ORAL at 05:11

## 2025-03-14 NOTE — OCCUPATIONAL THERAPY INITIAL EVALUATION ADULT - GENERAL OBSERVATIONS, REHAB EVAL
Pt received standing at sink side with PCA Amanda following toileting, A&Ox4, agreeable to OT IE with complaints of lower back pain, RN aware. Per PCA, pt completed toileting with cga, able to manage flaco-hygiene. Pt tolerated OT IE well, performing functional mobility within unit room with contact guard assistance without device, unsteady during turns, limited by back pain. Pt required incidental assistance for LE dressing in figure-four due to pain. Pt left seated in werner-chair with all safety measures in place, all needs met, handoff to RN regarding pt pain - to provide with pain patch. All needs met. Pt received standing at sink side with PCA Krissy following toileting, A&Ox4, agreeable to OT IE with complaints of lower back pain, RN aware. Per PCA, pt completed toileting with cga, able to manage flaco-hygiene. Pt tolerated OT IE well, performing functional mobility within unit room with contact guard assistance without device, unsteady during turns, limited by back pain. Pt required incidental assistance for LE dressing in figure-four due to pain. Pt left seated in werner-chair with all safety measures in place, all needs met, handoff to RN regarding pt pain - to provide with pain patch. All needs met.

## 2025-03-14 NOTE — OCCUPATIONAL THERAPY INITIAL EVALUATION ADULT - ADDITIONAL COMMENTS
Pt lives in  with daughter, 2 STIVEN, main floor setup, stall shower with grab bars. Pt reports independence with ADLs/IADLs, drives. Pt reports tripping on curb leading to most recent fall, no additional falls past 12-months. Pt reports daughter plans to work from home for 1-week and is willing/ able to assist pt. Pt's son also lives 5 minutes away and is available. Pt receptive to OT rec of commode and shower chair.

## 2025-03-14 NOTE — OCCUPATIONAL THERAPY INITIAL EVALUATION ADULT - NSACTIVITYREC_GEN_A_OT
Pt would benefit from skilled OT services at home to improve independence with ADLs and functional transfers. Recommending supervision and incidental assistance with dressing tasks and functional transfers upon dc home. Pt reporting daughter is willing/able to assist, and she plans to work from home for 1 week. Pt receptive to OT education/recommendations.   Standby assist x1 ambulation within room.

## 2025-03-14 NOTE — OCCUPATIONAL THERAPY INITIAL EVALUATION ADULT - MD ORDER
MD orders received, chart reviewed, contents noted. Pt cleared for OT initial evaluation by MATHEUS Smith. Refer below for findings.

## 2025-03-14 NOTE — PROGRESS NOTE ADULT - ASSESSMENT
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 Patient is being admitted for the management of the following:    #Intractable Back and s/p Mechanical Fall 3/4  #Subacute T12 and L1 Compression Fracture  #Suspect Osteopenia/Osteoporosis  #Ambulatory Dysfunction   -CT abd/pelvis showed The bones are diffusely osteopenic. Chronic degenerative changes of the spine. Mild loss of height of the T12 and L1 vertebral bodies indicative of recent compression fracture. There is slight retropulsion of the L1 vertebral body resulting in mild retropulsion into the right aspect of the spinal canal.  -MRI L Spine 3/12 from [facility] ([id number]) obtained showed: Superior and inferior T12 endplate depressions. Broad based superior L1 endplate depression. Approximately 20% loss of central vertebral body height at both levels. Mild bony retropulsion of the superior L1 endplate, impressing upon the thecal sac. AT both of these levels, there is mild indistinctly marginated low T1, bright STIR signal within the compressed marrow suggestive of marrow edema indicating that this may both be of subacute age.   -WBAT  -Fall precaution   -Pain control: Trial of tylenol 975mg Q8H, lidocaine patch, flexeril 5mg TID, oxycodone 5mg Q6H PRN for moderate pain and oxycodone 10mg Q6H for severe pain + bowel regimen  -Spoke with Dr. Diaz (Neurosurgeon) 3/13 via transfer center, no surgical intervention warranted at this time, recommended TLSO brace (pending), pain control and PT. If pain is uncontrolled, can consider transfer for kyphoplasty   -PT/OT eval appreciated, recommended home PT  -PMR follow up for final recommendation   -Outpatient follow up with PMD for treatment for osteoporosis     #Acute UTI  #Overactive Bladder   -UA on admission: large LE, positive nitrite, 12 WBC  -s/p Ceftin in ED  -Continue Ceftin 500mg BID (day 3)  -Follow up UCx pending   -Continue home medication Mirabegron     #Abnormal CXR Findings  -CXR showed Patchy left midlung zone opacity. Patient is asymptomatic, no cough/SOB/hypoxia  -Incentive spirometry encouraged   -Repeat CXR with PMD    #Hx of DVT/PE (2023)  -Continue Eliquis 2.5mg BID     #HTN  -Continue Atenolol, Diltiazem, Losartan   -Monitor BP and HR     #HLD  -Continue Lipitor     #GERD  -Continue PPI     #Depression  -Continue zoloft     #History of DVT/PE  -On Eliquis     #DVT ppx  -Eliquis     Plan of care discussed with patient and patient's daughter Terra over the phone, they are in agreement, all questions were answered

## 2025-03-14 NOTE — PROGRESS NOTE ADULT - SUBJECTIVE AND OBJECTIVE BOX
Interval History  Patient seen and examined at bedside. No acute events noted.  Patient reports feeling okay, pain is better controlled on current regimen. No radiculopathy. No LE weakness/numbness/tingling. No other complaints.    ALLERGIES:  No Known Drug Allergies  Seafood (Unknown)    MEDICATIONS  (STANDING):  acetaminophen     Tablet .. 975 milliGRAM(s) Oral every 8 hours  apixaban 2.5 milliGRAM(s) Oral every 12 hours  atenolol  Tablet 100 milliGRAM(s) Oral daily  atorvastatin 20 milliGRAM(s) Oral at bedtime  cefuroxime   Tablet 500 milliGRAM(s) Oral every 12 hours  cyclobenzaprine 5 milliGRAM(s) Oral three times a day  diltiazem    milliGRAM(s) Oral daily  influenza  Vaccine (HIGH DOSE) 0.5 milliLiter(s) IntraMuscular once  lidocaine   4% Patch 1 Patch Transdermal every 24 hours  losartan 50 milliGRAM(s) Oral daily  mirabegron ER 25 milliGRAM(s) Oral daily  pantoprazole    Tablet 40 milliGRAM(s) Oral before breakfast  polyethylene glycol 3350 17 Gram(s) Oral daily  senna 1 Tablet(s) Oral daily  sertraline 150 milliGRAM(s) Oral daily    MEDICATIONS  (PRN):  aluminum hydroxide/magnesium hydroxide/simethicone Suspension 30 milliLiter(s) Oral every 4 hours PRN Dyspepsia  melatonin 3 milliGRAM(s) Oral at bedtime PRN Insomnia  ondansetron Injectable 4 milliGRAM(s) IV Push every 8 hours PRN Nausea and/or Vomiting  oxyCODONE    IR 5 milliGRAM(s) Oral every 6 hours PRN Moderate Pain (4 - 6)  oxyCODONE    IR 10 milliGRAM(s) Oral every 6 hours PRN Severe Pain (7 - 10)    Vital Signs Last 24 Hrs  T(F): 98.1 (14 Mar 2025 05:00), Max: 98.3 (13 Mar 2025 13:33)  HR: 85 (14 Mar 2025 09:12) (73 - 85)  BP: 167/74 (14 Mar 2025 09:12) (159/68 - 167/74)  RR: 16 (14 Mar 2025 05:00) (16 - 18)  SpO2: 93% (14 Mar 2025 09:12) (90% - 93%)  I&O's Summary    BMI (kg/m2): 26.9 (03-13-25 @ 12:26)    PHYSICAL EXAM:  GENERAL: NAD  HEENT: Facial ecchymoses - resolving, forehead abrasions  CHEST/LUNG: Clear to percussion bilaterally; No rales, rhonchi, wheezing  HEART: Regular rate and rhythm  ABDOMEN: Soft, Nontender, Nondistended; Bowel sounds present  MUSCULOSKELETAL/EXTREMITIES:  2+ Peripheral Pulses, No LE edema  Right lower back paraspinal muscle tenderness on palpation, +vertebral tenderness in lower thoracic/upper lumbar region.   PSYCH: Appropriate affect  NEURO: Alert & Oriented x 3, 5/5 LE strength    I personally reviewed the below data/images/labs:    LABS:                        12.2   6.65  )-----------( 282      ( 14 Mar 2025 06:50 )             36.5       03-14    141  |  105  |  10  ----------------------------<  114  3.6   |  27  |  0.54    Ca    9.1      14 Mar 2025 06:50    TPro  6.9  /  Alb  3.2  /  TBili  0.4  /  DBili  x   /  AST  30  /  ALT  29  /  AlkPhos  99  03-14 03-14 Chol 138 mg/dL LDL -- HDL 41 mg/dL Trig 119 mg/dL    Urinalysis Basic - ( 14 Mar 2025 06:50 )    Color: x / Appearance: x / SG: x / pH: x  Gluc: 114 mg/dL / Ketone: x  / Bili: x / Urobili: x   Blood: x / Protein: x / Nitrite: x   Leuk Esterase: x / RBC: x / WBC x   Sq Epi: x / Non Sq Epi: x / Bacteria: x    Consultant(s) Notes Reviewed:   Care Discused with Consultants/Other Providers:  Imaging Personally Reviewed:

## 2025-03-14 NOTE — OCCUPATIONAL THERAPY INITIAL EVALUATION ADULT - PERTINENT HX OF CURRENT PROBLEM, REHAB EVAL
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 Patient is being admitted for the management of the following:  #Intractable Back s/p Mechanical Fall 3/4  #Subacute T12 and L1 Compression Fracture  #Suspect Osteopenia/Osteoporosis

## 2025-03-15 PROCEDURE — 99233 SBSQ HOSP IP/OBS HIGH 50: CPT

## 2025-03-15 RX ORDER — SENNA 187 MG
2 TABLET ORAL AT BEDTIME
Refills: 0 | Status: DISCONTINUED | OUTPATIENT
Start: 2025-03-15 | End: 2025-03-19

## 2025-03-15 RX ORDER — MELATONIN 5 MG
3 TABLET ORAL ONCE
Refills: 0 | Status: COMPLETED | OUTPATIENT
Start: 2025-03-15 | End: 2025-03-15

## 2025-03-15 RX ORDER — BISACODYL 5 MG
10 TABLET, DELAYED RELEASE (ENTERIC COATED) ORAL DAILY
Refills: 0 | Status: DISCONTINUED | OUTPATIENT
Start: 2025-03-15 | End: 2025-03-19

## 2025-03-15 RX ORDER — POLYETHYLENE GLYCOL 3350 17 G/17G
17 POWDER, FOR SOLUTION ORAL
Refills: 0 | Status: DISCONTINUED | OUTPATIENT
Start: 2025-03-15 | End: 2025-03-19

## 2025-03-15 RX ADMIN — Medication 1 TABLET(S): at 10:38

## 2025-03-15 RX ADMIN — DILTIAZEM HYDROCHLORIDE 180 MILLIGRAM(S): 240 TABLET, EXTENDED RELEASE ORAL at 05:42

## 2025-03-15 RX ADMIN — Medication 3 MILLIGRAM(S): at 00:08

## 2025-03-15 RX ADMIN — OXYCODONE HYDROCHLORIDE 5 MILLIGRAM(S): 30 TABLET ORAL at 20:08

## 2025-03-15 RX ADMIN — OXYCODONE HYDROCHLORIDE 10 MILLIGRAM(S): 30 TABLET ORAL at 01:00

## 2025-03-15 RX ADMIN — Medication 500 MILLIGRAM(S): at 05:42

## 2025-03-15 RX ADMIN — OXYCODONE HYDROCHLORIDE 5 MILLIGRAM(S): 30 TABLET ORAL at 21:00

## 2025-03-15 RX ADMIN — LOSARTAN POTASSIUM 50 MILLIGRAM(S): 100 TABLET, FILM COATED ORAL at 05:43

## 2025-03-15 RX ADMIN — Medication 3 MILLIGRAM(S): at 22:00

## 2025-03-15 RX ADMIN — APIXABAN 2.5 MILLIGRAM(S): 2.5 TABLET, FILM COATED ORAL at 05:42

## 2025-03-15 RX ADMIN — LIDOCAINE HYDROCHLORIDE 1 PATCH: 20 JELLY TOPICAL at 10:28

## 2025-03-15 RX ADMIN — Medication 975 MILLIGRAM(S): at 15:13

## 2025-03-15 RX ADMIN — OXYCODONE HYDROCHLORIDE 10 MILLIGRAM(S): 30 TABLET ORAL at 00:08

## 2025-03-15 RX ADMIN — CYCLOBENZAPRINE HYDROCHLORIDE 5 MILLIGRAM(S): 15 CAPSULE, EXTENDED RELEASE ORAL at 22:00

## 2025-03-15 RX ADMIN — ATORVASTATIN CALCIUM 20 MILLIGRAM(S): 80 TABLET, FILM COATED ORAL at 22:00

## 2025-03-15 RX ADMIN — Medication 500 MILLIGRAM(S): at 18:13

## 2025-03-15 RX ADMIN — MIRABEGRON 25 MILLIGRAM(S): 50 TABLET, FILM COATED, EXTENDED RELEASE ORAL at 10:40

## 2025-03-15 RX ADMIN — LISINOPRIL 100 MILLIGRAM(S): 30 TABLET ORAL at 05:42

## 2025-03-15 RX ADMIN — Medication 975 MILLIGRAM(S): at 05:42

## 2025-03-15 RX ADMIN — APIXABAN 2.5 MILLIGRAM(S): 2.5 TABLET, FILM COATED ORAL at 18:12

## 2025-03-15 RX ADMIN — Medication 40 MILLIGRAM(S): at 05:46

## 2025-03-15 RX ADMIN — Medication 975 MILLIGRAM(S): at 06:41

## 2025-03-15 RX ADMIN — Medication 2 TABLET(S): at 22:00

## 2025-03-15 RX ADMIN — LIDOCAINE HYDROCHLORIDE 1 PATCH: 20 JELLY TOPICAL at 19:00

## 2025-03-15 RX ADMIN — CYCLOBENZAPRINE HYDROCHLORIDE 5 MILLIGRAM(S): 15 CAPSULE, EXTENDED RELEASE ORAL at 15:13

## 2025-03-15 RX ADMIN — Medication 10 MILLIGRAM(S): at 16:28

## 2025-03-15 RX ADMIN — CYCLOBENZAPRINE HYDROCHLORIDE 5 MILLIGRAM(S): 15 CAPSULE, EXTENDED RELEASE ORAL at 05:42

## 2025-03-15 RX ADMIN — SERTRALINE 150 MILLIGRAM(S): 100 TABLET, FILM COATED ORAL at 10:39

## 2025-03-15 RX ADMIN — POLYETHYLENE GLYCOL 3350 17 GRAM(S): 17 POWDER, FOR SOLUTION ORAL at 18:13

## 2025-03-15 RX ADMIN — LIDOCAINE HYDROCHLORIDE 1 PATCH: 20 JELLY TOPICAL at 22:00

## 2025-03-15 RX ADMIN — POLYETHYLENE GLYCOL 3350 17 GRAM(S): 17 POWDER, FOR SOLUTION ORAL at 10:38

## 2025-03-15 NOTE — PROGRESS NOTE ADULT - SUBJECTIVE AND OBJECTIVE BOX
Interval History  Patient seen and examined at bedside. No acute events noted.  Patient endorses back pain, mild at this time, managed on current regimen. No BM x 3 days, no N/V/abdominal pain.     ALLERGIES:  No Known Drug Allergies  Seafood (Unknown)    MEDICATIONS  (STANDING):  acetaminophen     Tablet .. 975 milliGRAM(s) Oral every 8 hours  apixaban 2.5 milliGRAM(s) Oral every 12 hours  atenolol  Tablet 100 milliGRAM(s) Oral daily  atorvastatin 20 milliGRAM(s) Oral at bedtime  cefuroxime   Tablet 500 milliGRAM(s) Oral every 12 hours  cyclobenzaprine 5 milliGRAM(s) Oral three times a day  diltiazem    milliGRAM(s) Oral daily  influenza  Vaccine (HIGH DOSE) 0.5 milliLiter(s) IntraMuscular once  lidocaine   4% Patch 1 Patch Transdermal every 24 hours  losartan 50 milliGRAM(s) Oral daily  mirabegron ER 25 milliGRAM(s) Oral daily  pantoprazole    Tablet 40 milliGRAM(s) Oral before breakfast  polyethylene glycol 3350 17 Gram(s) Oral two times a day  senna 1 Tablet(s) Oral daily  sertraline 150 milliGRAM(s) Oral daily    MEDICATIONS  (PRN):  aluminum hydroxide/magnesium hydroxide/simethicone Suspension 30 milliLiter(s) Oral every 4 hours PRN Dyspepsia  bisacodyl Suppository 10 milliGRAM(s) Rectal daily PRN Constipation  melatonin 3 milliGRAM(s) Oral at bedtime PRN Insomnia  ondansetron Injectable 4 milliGRAM(s) IV Push every 8 hours PRN Nausea and/or Vomiting  oxyCODONE    IR 5 milliGRAM(s) Oral every 6 hours PRN Moderate Pain (4 - 6)  oxyCODONE    IR 10 milliGRAM(s) Oral every 6 hours PRN Severe Pain (7 - 10)    Vital Signs Last 24 Hrs  T(F): 98 (15 Mar 2025 12:00), Max: 98.4 (15 Mar 2025 10:32)  HR: 67 (15 Mar 2025 12:00) (67 - 85)  BP: 106/71 (15 Mar 2025 12:00) (106/71 - 161/72)  RR: 17 (15 Mar 2025 12:00) (17 - 17)  SpO2: 90% (15 Mar 2025 12:00) (88% - 96%)  I&O's Summary    BMI (kg/m2): 26.9 (03-14-25 @ 12:06)    PHYSICAL EXAM:  GENERAL: NAD  HEENT: Facial ecchymoses - resolving, forehead abrasions  CHEST/LUNG: Clear to percussion bilaterally; No rales, rhonchi, wheezing  HEART: Regular rate and rhythm  ABDOMEN: Soft, Nontender, Nondistended; Bowel sounds present  MUSCULOSKELETAL/EXTREMITIES:  2+ Peripheral Pulses, No LE edema  Right lower back paraspinal muscle tenderness on palpation, +vertebral tenderness in lower thoracic/upper lumbar region.   PSYCH: Appropriate affect  NEURO: Alert & Oriented x 3, 5/5 LE strength    I personally reviewed the below data/images/labs:    LABS:                        12.2   6.65  )-----------( 282      ( 14 Mar 2025 06:50 )             36.5       03-14    141  |  105  |  10  ----------------------------<  114  3.6   |  27  |  0.54    Ca    9.1      14 Mar 2025 06:50    TPro  6.9  /  Alb  3.2  /  TBili  0.4  /  DBili  x   /  AST  30  /  ALT  29  /  AlkPhos  99  03-14     03-14 Chol 138 mg/dL LDL -- HDL 41 mg/dL Trig 119 mg/dL    Urinalysis Basic - ( 14 Mar 2025 06:50 )    Color: x / Appearance: x / SG: x / pH: x  Gluc: 114 mg/dL / Ketone: x  / Bili: x / Urobili: x   Blood: x / Protein: x / Nitrite: x   Leuk Esterase: x / RBC: x / WBC x   Sq Epi: x / Non Sq Epi: x / Bacteria: x    Culture - Urine (collected 13 Mar 2025 01:29)  Source: Clean Catch None  Preliminary Report (14 Mar 2025 14:01):    >100,000 CFU/ml Escherichia coli    Consultant(s) Notes Reviewed:   Care Discused with Consultants/Other Providers:  Imaging Personally Reviewed:

## 2025-03-15 NOTE — PROGRESS NOTE ADULT - ASSESSMENT
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 Patient is being admitted for the management of the following:    #Intractable Back and s/p Mechanical Fall 3/4  #Subacute T12 and L1 Compression Fracture  #Suspect Osteopenia/Osteoporosis  #Ambulatory Dysfunction   -CT abd/pelvis showed The bones are diffusely osteopenic. Chronic degenerative changes of the spine. Mild loss of height of the T12 and L1 vertebral bodies indicative of recent compression fracture. There is slight retropulsion of the L1 vertebral body resulting in mild retropulsion into the right aspect of the spinal canal.  -MRI L Spine 3/12 from [facility] ([id number]) obtained showed: Superior and inferior T12 endplate depressions. Broad based superior L1 endplate depression. Approximately 20% loss of central vertebral body height at both levels. Mild bony retropulsion of the superior L1 endplate, impressing upon the thecal sac. AT both of these levels, there is mild indistinctly marginated low T1, bright STIR signal within the compressed marrow suggestive of marrow edema indicating that this may both be of subacute age.   -WBAT  -Fall precaution   -Pain control: Conitnue tylenol 975mg Q8H, lidocaine patch, flexeril 5mg TID, oxycodone 5mg Q6H PRN for moderate pain and oxycodone 10mg Q6H for severe pain + bowel regimen  -Spoke with Dr. Diaz (Neurosurgeon) 3/13 via transfer center, no surgical intervention warranted at this time, recommended TLSO brace, pain control and PT. If pain is uncontrolled, can consider transfer for kyphoplasty   -OOB w/ TLSO   -PT/OT eval appreciated, recommended home PT  -PMR eval appreciated, discussed with Dr. Cardoso 3/14, recommended home PT  -Outpatient follow up with PMD for treatment for osteoporosis     #Acute UTI  #Overactive Bladder   -UA on admission: large LE, positive nitrite, 12 WBC  -s/p Ceftin in ED  -Continue Ceftin 500mg BID (day 4/5)  -Urine culture growing E coli with sensitivities pending   -Continue home medication Mirabegron     #Abnormal CXR Findings  -CXR showed Patchy left midlung zone opacity. Patient is asymptomatic, no cough/SOB/hypoxia  -Incentive spirometry encouraged   -Repeat CXR with PMD    #Hx of DVT/PE (2023)  -Continue Eliquis 2.5mg BID     #HTN  -Continue Atenolol, Diltiazem, Losartan   -Monitor BP and HR     #HLD  -Continue Lipitor     #GERD  -Continue PPI     #Depression  -Continue zoloft     #Constipation  -Increase miralax to BID  -Continue senna  -Dulcolax supp PRN   -Monitor BM's    #History of DVT/PE  -On Eliquis     #DVT ppx  -Eliquis     Dispo  -From home, independent prior to fall  -PT/OT recommended home PT, however, patient and family would prefer [facility] - discussed with CM    Plan of care discussed with patient and patient's daughter Terra over the phone, all questions were answered

## 2025-03-16 PROCEDURE — 99232 SBSQ HOSP IP/OBS MODERATE 35: CPT

## 2025-03-16 RX ORDER — CEFUROXIME SODIUM 1.5 G
500 VIAL (EA) INJECTION ONCE
Refills: 0 | Status: COMPLETED | OUTPATIENT
Start: 2025-03-16 | End: 2025-03-16

## 2025-03-16 RX ADMIN — POLYETHYLENE GLYCOL 3350 17 GRAM(S): 17 POWDER, FOR SOLUTION ORAL at 17:04

## 2025-03-16 RX ADMIN — Medication 500 MILLIGRAM(S): at 17:04

## 2025-03-16 RX ADMIN — Medication 500 MILLIGRAM(S): at 05:43

## 2025-03-16 RX ADMIN — ATORVASTATIN CALCIUM 20 MILLIGRAM(S): 80 TABLET, FILM COATED ORAL at 21:15

## 2025-03-16 RX ADMIN — LIDOCAINE HYDROCHLORIDE 1 PATCH: 20 JELLY TOPICAL at 08:05

## 2025-03-16 RX ADMIN — Medication 40 MILLIGRAM(S): at 05:43

## 2025-03-16 RX ADMIN — LIDOCAINE HYDROCHLORIDE 1 PATCH: 20 JELLY TOPICAL at 19:56

## 2025-03-16 RX ADMIN — LISINOPRIL 100 MILLIGRAM(S): 30 TABLET ORAL at 05:42

## 2025-03-16 RX ADMIN — SERTRALINE 150 MILLIGRAM(S): 100 TABLET, FILM COATED ORAL at 15:11

## 2025-03-16 RX ADMIN — Medication 975 MILLIGRAM(S): at 15:11

## 2025-03-16 RX ADMIN — OXYCODONE HYDROCHLORIDE 10 MILLIGRAM(S): 30 TABLET ORAL at 11:15

## 2025-03-16 RX ADMIN — Medication 975 MILLIGRAM(S): at 05:42

## 2025-03-16 RX ADMIN — APIXABAN 2.5 MILLIGRAM(S): 2.5 TABLET, FILM COATED ORAL at 05:42

## 2025-03-16 RX ADMIN — Medication 975 MILLIGRAM(S): at 21:14

## 2025-03-16 RX ADMIN — CYCLOBENZAPRINE HYDROCHLORIDE 5 MILLIGRAM(S): 15 CAPSULE, EXTENDED RELEASE ORAL at 21:14

## 2025-03-16 RX ADMIN — CYCLOBENZAPRINE HYDROCHLORIDE 5 MILLIGRAM(S): 15 CAPSULE, EXTENDED RELEASE ORAL at 06:34

## 2025-03-16 RX ADMIN — DILTIAZEM HYDROCHLORIDE 180 MILLIGRAM(S): 240 TABLET, EXTENDED RELEASE ORAL at 05:43

## 2025-03-16 RX ADMIN — MIRABEGRON 25 MILLIGRAM(S): 50 TABLET, FILM COATED, EXTENDED RELEASE ORAL at 15:10

## 2025-03-16 RX ADMIN — Medication 975 MILLIGRAM(S): at 22:14

## 2025-03-16 RX ADMIN — LIDOCAINE HYDROCHLORIDE 1 PATCH: 20 JELLY TOPICAL at 20:01

## 2025-03-16 RX ADMIN — LOSARTAN POTASSIUM 50 MILLIGRAM(S): 100 TABLET, FILM COATED ORAL at 05:42

## 2025-03-16 RX ADMIN — Medication 975 MILLIGRAM(S): at 06:54

## 2025-03-16 RX ADMIN — POLYETHYLENE GLYCOL 3350 17 GRAM(S): 17 POWDER, FOR SOLUTION ORAL at 05:42

## 2025-03-16 RX ADMIN — OXYCODONE HYDROCHLORIDE 10 MILLIGRAM(S): 30 TABLET ORAL at 10:28

## 2025-03-16 RX ADMIN — Medication 975 MILLIGRAM(S): at 16:00

## 2025-03-16 RX ADMIN — Medication 2 TABLET(S): at 21:15

## 2025-03-16 RX ADMIN — CYCLOBENZAPRINE HYDROCHLORIDE 5 MILLIGRAM(S): 15 CAPSULE, EXTENDED RELEASE ORAL at 15:11

## 2025-03-16 RX ADMIN — APIXABAN 2.5 MILLIGRAM(S): 2.5 TABLET, FILM COATED ORAL at 17:03

## 2025-03-16 NOTE — PROGRESS NOTE ADULT - SUBJECTIVE AND OBJECTIVE BOX
Progress Notes by Becki Katz RN at 02/13/17 04:23 PM     Author:  Becki Katz RN Service:  (none) Author Type:  Registered Nurse     Filed:  02/13/17 04:24 PM Encounter Date:  2/13/2017 Status:  Signed     :  Becki Katz RN (Registered Nurse)            Orders placed per result note 2/10/17[NV1.1M]      Revision History        User Key Date/Time User Provider Type Action    > NV1.1 02/13/17 04:24 PM Becki Katz RN Registered Nurse Sign    M - Manual             Interval History  Patient seen and examined at bedside. No acute evens noted.  Patient reports feeling okay this morning, pain is mild, oxycodone is helping.   Had small BM yesterday. No N/V/abdominal pain.     ALLERGIES:  No Known Drug Allergies  Seafood (Unknown)    MEDICATIONS  (STANDING):  acetaminophen     Tablet .. 975 milliGRAM(s) Oral every 8 hours  apixaban 2.5 milliGRAM(s) Oral every 12 hours  atenolol  Tablet 100 milliGRAM(s) Oral daily  atorvastatin 20 milliGRAM(s) Oral at bedtime  cefuroxime   Tablet 500 milliGRAM(s) Oral every 12 hours  cyclobenzaprine 5 milliGRAM(s) Oral three times a day  diltiazem    milliGRAM(s) Oral daily  influenza  Vaccine (HIGH DOSE) 0.5 milliLiter(s) IntraMuscular once  lidocaine   4% Patch 1 Patch Transdermal every 24 hours  losartan 50 milliGRAM(s) Oral daily  mirabegron ER 25 milliGRAM(s) Oral daily  pantoprazole    Tablet 40 milliGRAM(s) Oral before breakfast  polyethylene glycol 3350 17 Gram(s) Oral two times a day  senna 2 Tablet(s) Oral at bedtime  sertraline 150 milliGRAM(s) Oral daily    MEDICATIONS  (PRN):  aluminum hydroxide/magnesium hydroxide/simethicone Suspension 30 milliLiter(s) Oral every 4 hours PRN Dyspepsia  bisacodyl Suppository 10 milliGRAM(s) Rectal daily PRN Constipation  melatonin 3 milliGRAM(s) Oral at bedtime PRN Insomnia  ondansetron Injectable 4 milliGRAM(s) IV Push every 8 hours PRN Nausea and/or Vomiting  oxyCODONE    IR 5 milliGRAM(s) Oral every 6 hours PRN Moderate Pain (4 - 6)  oxyCODONE    IR 10 milliGRAM(s) Oral every 6 hours PRN Severe Pain (7 - 10)    Vital Signs Last 24 Hrs  T(F): 98.8 (16 Mar 2025 05:10), Max: 98.8 (16 Mar 2025 05:10)  HR: 91 (16 Mar 2025 05:10) (72 - 91)  BP: 167/85 (16 Mar 2025 05:10) (140/72 - 167/85)  RR: 16 (16 Mar 2025 07:54) (16 - 18)  SpO2: 90% (16 Mar 2025 07:54) (86% - 92%)  I&O's Summary    BMI (kg/m2): 26.9 (03-15-25 @ 12:59)    PHYSICAL EXAM:  GENERAL: NAD  HEENT: Facial ecchymoses - resolving, forehead abrasions  CHEST/LUNG: Clear to percussion bilaterally; No rales, rhonchi, wheezing  HEART: Regular rate and rhythm  ABDOMEN: Soft, Nontender, Nondistended; Bowel sounds present  MUSCULOSKELETAL/EXTREMITIES:  2+ Peripheral Pulses, No LE edema  Right lower back paraspinal muscle tenderness on palpation, +vertebral tenderness in lower thoracic/upper lumbar region - improved   PSYCH: Appropriate affect  NEURO: Alert & Oriented x 3, 5/5 LE strength    I personally reviewed the below data/images/labs:    LABS:                        12.2   6.65  )-----------( 282      ( 14 Mar 2025 06:50 )             36.5       03-14    141  |  105  |  10  ----------------------------<  114  3.6   |  27  |  0.54    Ca    9.1      14 Mar 2025 06:50    TPro  6.9  /  Alb  3.2  /  TBili  0.4  /  DBili  x   /  AST  30  /  ALT  29  /  AlkPhos  99  03-14     03-14 Chol 138 mg/dL LDL -- HDL 41 mg/dL Trig 119 mg/dL    Urinalysis Basic - ( 14 Mar 2025 06:50 )    Color: x / Appearance: x / SG: x / pH: x  Gluc: 114 mg/dL / Ketone: x  / Bili: x / Urobili: x   Blood: x / Protein: x / Nitrite: x   Leuk Esterase: x / RBC: x / WBC x   Sq Epi: x / Non Sq Epi: x / Bacteria: x    Culture - Urine (collected 13 Mar 2025 01:29)  Source: Clean Catch None  Final Report (16 Mar 2025 08:16):    >100,000 CFU/ml Escherichia coli  Organism: Escherichia coli (16 Mar 2025 08:16)  Organism: Escherichia coli (16 Mar 2025 08:16)      Method Type: MADELYN      -  Amoxicillin/Clavulanic Acid: S <=8/4      -  Ampicillin: S <=8 These ampicillin results predict results for amoxicillin      -  Ampicillin/Sulbactam: S <=4/2      -  Aztreonam: S <=4      -  Cefazolin: S <=2 For uncomplicated UTI with K. pneumoniae, E. coli, or P. mirablis: MADELYN <=16 is sensitive and MADELYN >=32 is resistant. This also predicts results for oral agents cefaclor, cefdinir, cefpodoxime, cefprozil, cefuroxime axetil, cephalexin and locarbef for uncomplicated UTI. Note that some isolates may be susceptible to these agents while testing resistant to cefazolin.      -  Cefepime: S <=2      -  Cefoxitin: S <=8      -  Ceftriaxone: S <=1      -  Cefuroxime: S 8      -  Ciprofloxacin: S <=0.25      -  Ertapenem: S <=0.5      -  Gentamicin: S <=2      -  Imipenem: S <=1      -  Levofloxacin: S <=0.5      -  Meropenem: S <=1      -  Nitrofurantoin: S <=32 Should not be used to treat pyelonephritis      -  Piperacillin/Tazobactam: S <=8      -  Tobramycin: S <=2      -  Trimethoprim/Sulfamethoxazole: S <=0.5/9.    Consultant(s) Notes Reviewed:   Care Discused with Consultants/Other Providers:  Imaging Personally Reviewed:

## 2025-03-16 NOTE — PROGRESS NOTE ADULT - ASSESSMENT
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 Patient is being admitted for the management of the following:    #Intractable Back and s/p Mechanical Fall 3/4  #Subacute T12 and L1 Compression Fracture  #Suspect Osteopenia/Osteoporosis  #Ambulatory Dysfunction   -CT abd/pelvis showed The bones are diffusely osteopenic. Chronic degenerative changes of the spine. Mild loss of height of the T12 and L1 vertebral bodies indicative of recent compression fracture. There is slight retropulsion of the L1 vertebral body resulting in mild retropulsion into the right aspect of the spinal canal.  -MRI L Spine 3/12 from [facility] ([id number]) obtained showed: Superior and inferior T12 endplate depressions. Broad based superior L1 endplate depression. Approximately 20% loss of central vertebral body height at both levels. Mild bony retropulsion of the superior L1 endplate, impressing upon the thecal sac. AT both of these levels, there is mild indistinctly marginated low T1, bright STIR signal within the compressed marrow suggestive of marrow edema indicating that this may both be of subacute age.   -WBAT  -Fall precaution   -Pain control: Conitnue tylenol 975mg Q8H, lidocaine patch, flexeril 5mg TID, oxycodone 5mg Q6H PRN for moderate pain and oxycodone 10mg Q6H for severe pain + bowel regimen  -Spoke with Dr. Diaz (Neurosurgeon) 3/13 via transfer center, no surgical intervention warranted at this time, recommended TLSO brace, pain control and PT. If pain is uncontrolled, can consider transfer for kyphoplasty   -OOB w/ TLSO   -PT/OT eval appreciated, recommended home PT  -PMR eval appreciated, discussed with Dr. Cardoso 3/14, recommended home PT  -Outpatient follow up with PMD for treatment for osteoporosis     #Acute UTI  #Overactive Bladder   -UA on admission: large LE, positive nitrite, 12 WBC  -Urine culture grew E coli with sensitivities as above   -Continue Ceftin 500mg BID (day 5/5)  -Continue home medication Mirabegron     #Abnormal CXR Findings  -CXR showed Patchy left midlung zone opacity. Patient is asymptomatic, no cough/SOB/hypoxia  -Incentive spirometry encouraged   -Repeat CXR with PMD    #Hx of DVT/PE (2023)  -Continue Eliquis 2.5mg BID     #HTN  -Continue Atenolol, Diltiazem, Losartan   -Monitor BP and HR     #HLD  -Continue Lipitor     #GERD  -Continue PPI     #Depression  -Continue zoloft     #Constipation  -Continue miralax and senna   -Dulcolax supp PRN   -Monitor BM's    #History of DVT/PE  #DVT PPx   -On Eliquis     Dispo  -From home, independent prior to fall  -PT/OT recommended home PT, however, patient and family would prefer [facility] - pending choices/auth    Plan of care discussed with patient and patient's daughter Terra over the phone, all questions were answered

## 2025-03-17 LAB
ALBUMIN SERPL ELPH-MCNC: 3.5 G/DL — SIGNIFICANT CHANGE UP (ref 3.3–5)
ALP SERPL-CCNC: 132 U/L — HIGH (ref 40–120)
ALT FLD-CCNC: 36 U/L — SIGNIFICANT CHANGE UP (ref 10–45)
ANION GAP SERPL CALC-SCNC: 11 MMOL/L — SIGNIFICANT CHANGE UP (ref 5–17)
AST SERPL-CCNC: 40 U/L — SIGNIFICANT CHANGE UP (ref 10–40)
BILIRUB SERPL-MCNC: 0.6 MG/DL — SIGNIFICANT CHANGE UP (ref 0.2–1.2)
BUN SERPL-MCNC: 16 MG/DL — SIGNIFICANT CHANGE UP (ref 7–23)
CALCIUM SERPL-MCNC: 9.6 MG/DL — SIGNIFICANT CHANGE UP (ref 8.4–10.5)
CHLORIDE SERPL-SCNC: 102 MMOL/L — SIGNIFICANT CHANGE UP (ref 96–108)
CO2 SERPL-SCNC: 26 MMOL/L — SIGNIFICANT CHANGE UP (ref 22–31)
CREAT SERPL-MCNC: 0.45 MG/DL — LOW (ref 0.5–1.3)
EGFR: 95 ML/MIN/1.73M2 — SIGNIFICANT CHANGE UP
EGFR: 95 ML/MIN/1.73M2 — SIGNIFICANT CHANGE UP
GLUCOSE SERPL-MCNC: 104 MG/DL — HIGH (ref 70–99)
HCT VFR BLD CALC: 40.4 % — SIGNIFICANT CHANGE UP (ref 34.5–45)
HGB BLD-MCNC: 13.3 G/DL — SIGNIFICANT CHANGE UP (ref 11.5–15.5)
MCHC RBC-ENTMCNC: 30.4 PG — SIGNIFICANT CHANGE UP (ref 27–34)
MCHC RBC-ENTMCNC: 32.9 G/DL — SIGNIFICANT CHANGE UP (ref 32–36)
MCV RBC AUTO: 92.2 FL — SIGNIFICANT CHANGE UP (ref 80–100)
NRBC BLD AUTO-RTO: 0 /100 WBCS — SIGNIFICANT CHANGE UP (ref 0–0)
PLATELET # BLD AUTO: 310 K/UL — SIGNIFICANT CHANGE UP (ref 150–400)
POTASSIUM SERPL-MCNC: 4 MMOL/L — SIGNIFICANT CHANGE UP (ref 3.5–5.3)
POTASSIUM SERPL-SCNC: 4 MMOL/L — SIGNIFICANT CHANGE UP (ref 3.5–5.3)
PROT SERPL-MCNC: 7.7 G/DL — SIGNIFICANT CHANGE UP (ref 6–8.3)
RBC # BLD: 4.38 M/UL — SIGNIFICANT CHANGE UP (ref 3.8–5.2)
RBC # FLD: 12.4 % — SIGNIFICANT CHANGE UP (ref 10.3–14.5)
SODIUM SERPL-SCNC: 139 MMOL/L — SIGNIFICANT CHANGE UP (ref 135–145)
WBC # BLD: 7.98 K/UL — SIGNIFICANT CHANGE UP (ref 3.8–10.5)
WBC # FLD AUTO: 7.98 K/UL — SIGNIFICANT CHANGE UP (ref 3.8–10.5)

## 2025-03-17 PROCEDURE — 99233 SBSQ HOSP IP/OBS HIGH 50: CPT | Mod: GC

## 2025-03-17 PROCEDURE — 99233 SBSQ HOSP IP/OBS HIGH 50: CPT

## 2025-03-17 RX ORDER — CYCLOBENZAPRINE HYDROCHLORIDE 15 MG/1
5 CAPSULE, EXTENDED RELEASE ORAL THREE TIMES A DAY
Refills: 0 | Status: DISCONTINUED | OUTPATIENT
Start: 2025-03-17 | End: 2025-03-19

## 2025-03-17 RX ORDER — OXYCODONE HYDROCHLORIDE 30 MG/1
5 TABLET ORAL EVERY 6 HOURS
Refills: 0 | Status: DISCONTINUED | OUTPATIENT
Start: 2025-03-17 | End: 2025-03-19

## 2025-03-17 RX ORDER — ACETAMINOPHEN 500 MG/5ML
975 LIQUID (ML) ORAL EVERY 8 HOURS
Refills: 0 | Status: DISCONTINUED | OUTPATIENT
Start: 2025-03-17 | End: 2025-03-19

## 2025-03-17 RX ORDER — TRAMADOL HYDROCHLORIDE 50 MG/1
50 TABLET, FILM COATED ORAL EVERY 6 HOURS
Refills: 0 | Status: DISCONTINUED | OUTPATIENT
Start: 2025-03-17 | End: 2025-03-19

## 2025-03-17 RX ADMIN — Medication 2 TABLET(S): at 22:15

## 2025-03-17 RX ADMIN — OXYCODONE HYDROCHLORIDE 5 MILLIGRAM(S): 30 TABLET ORAL at 05:07

## 2025-03-17 RX ADMIN — Medication 975 MILLIGRAM(S): at 22:45

## 2025-03-17 RX ADMIN — LISINOPRIL 100 MILLIGRAM(S): 30 TABLET ORAL at 05:09

## 2025-03-17 RX ADMIN — OXYCODONE HYDROCHLORIDE 5 MILLIGRAM(S): 30 TABLET ORAL at 04:07

## 2025-03-17 RX ADMIN — CYCLOBENZAPRINE HYDROCHLORIDE 5 MILLIGRAM(S): 15 CAPSULE, EXTENDED RELEASE ORAL at 05:08

## 2025-03-17 RX ADMIN — DILTIAZEM HYDROCHLORIDE 180 MILLIGRAM(S): 240 TABLET, EXTENDED RELEASE ORAL at 05:10

## 2025-03-17 RX ADMIN — TRAMADOL HYDROCHLORIDE 50 MILLIGRAM(S): 50 TABLET, FILM COATED ORAL at 15:30

## 2025-03-17 RX ADMIN — APIXABAN 2.5 MILLIGRAM(S): 2.5 TABLET, FILM COATED ORAL at 17:10

## 2025-03-17 RX ADMIN — LIDOCAINE HYDROCHLORIDE 1 PATCH: 20 JELLY TOPICAL at 22:19

## 2025-03-17 RX ADMIN — LOSARTAN POTASSIUM 50 MILLIGRAM(S): 100 TABLET, FILM COATED ORAL at 05:09

## 2025-03-17 RX ADMIN — SERTRALINE 150 MILLIGRAM(S): 100 TABLET, FILM COATED ORAL at 12:42

## 2025-03-17 RX ADMIN — Medication 975 MILLIGRAM(S): at 06:08

## 2025-03-17 RX ADMIN — Medication 40 MILLIGRAM(S): at 05:09

## 2025-03-17 RX ADMIN — TRAMADOL HYDROCHLORIDE 50 MILLIGRAM(S): 50 TABLET, FILM COATED ORAL at 14:34

## 2025-03-17 RX ADMIN — LIDOCAINE HYDROCHLORIDE 1 PATCH: 20 JELLY TOPICAL at 22:00

## 2025-03-17 RX ADMIN — ATORVASTATIN CALCIUM 20 MILLIGRAM(S): 80 TABLET, FILM COATED ORAL at 22:15

## 2025-03-17 RX ADMIN — POLYETHYLENE GLYCOL 3350 17 GRAM(S): 17 POWDER, FOR SOLUTION ORAL at 05:12

## 2025-03-17 RX ADMIN — Medication 975 MILLIGRAM(S): at 22:15

## 2025-03-17 RX ADMIN — LIDOCAINE HYDROCHLORIDE 1 PATCH: 20 JELLY TOPICAL at 08:16

## 2025-03-17 RX ADMIN — MIRABEGRON 25 MILLIGRAM(S): 50 TABLET, FILM COATED, EXTENDED RELEASE ORAL at 12:42

## 2025-03-17 RX ADMIN — Medication 975 MILLIGRAM(S): at 05:08

## 2025-03-17 RX ADMIN — APIXABAN 2.5 MILLIGRAM(S): 2.5 TABLET, FILM COATED ORAL at 05:10

## 2025-03-17 NOTE — GOALS OF CARE CONVERSATION - ADVANCED CARE PLANNING - CONVERSATION DETAILS
I met with patient at bedside to review Advanced Directives and GOC. She is here for back pain s/p fall, also has hx. HTN and MVP. She is A&Ox3. However she has started having visual hallucinations since taking her pain meds. See's man in her bed, and her daughter standing in her room. She is aware these are "hallucinations" related to the pain meds. I informed her Hospitalist.  Pt. has 3 children Nicola, Terra, and Vero. She stated Nicola lives nearby and is her primary health agent. She states she signed a HCP form in "another hospital". Not found in Patient Window.   I also asked her about GOC. She wasn't sure, but felt her [facility] ([facility]) would want her to have CPR.   She stated her PCP Dr. Hathaway had given her the "form" to review last year.  Pt. remains Full Code.

## 2025-03-17 NOTE — PROGRESS NOTE ADULT - ASSESSMENT
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 Patient is being admitted for the management of the following:    #Intractable Back and s/p Mechanical Fall 3/4  #Subacute T12 and L1 Compression Fracture  #Suspect Osteopenia/Osteoporosis  #Ambulatory Dysfunction   -CT abd/pelvis showed The bones are diffusely osteopenic. Chronic degenerative changes of the spine. Mild loss of height of the T12 and L1 vertebral bodies indicative of recent compression fracture. There is slight retropulsion of the L1 vertebral body resulting in mild retropulsion into the right aspect of the spinal canal.  -MRI L Spine 3/12 from [facility] ([id number]) obtained showed: Superior and inferior T12 endplate depressions. Broad based superior L1 endplate depression. Approximately 20% loss of central vertebral body height at both levels. Mild bony retropulsion of the superior L1 endplate, impressing upon the thecal sac. AT both of these levels, there is mild indistinctly marginated low T1, bright STIR signal within the compressed marrow suggestive of marrow edema indicating that this may both be of subacute age.   -WBAT  -Fall precaution   -Pain control: Continue tylenol 975mg Q8H, lidocaine patch, change flexeril 5mg TID to PRN, trial of tramadol 50mg Q6H PRN for moderate pain, oxycodone 5mg Q6H PRN for severe pain  -Warm compress  -Spoke with Dr. Diaz (Neurosurgeon) 3/13 via transfer center, no surgical intervention warranted at this time, recommended TLSO brace, pain control and PT. If pain is uncontrolled, can consider transfer for kyphoplasty   -OOB w/ TLSO   -PT/OT eval appreciated, recommended home PT  -PMR eval appreciated, recommended home PT  -Outpatient follow up with PMD for treatment for osteoporosis     #Visual Hallucinations 2/2 Toxic Encephalopathy  -Suspect due to medications, no focal deficits on exam  -CTH 3/4 was negative for any acute intracranial pathology   -Medications adjustment as above  -Supportive care, frequent reorientation     #Acute UTI (Resolved)  #Overactive Bladder   -UA on admission: large LE, positive nitrite, 12 WBC  -Urine culture grew E coli with sensitivities as above   -s/p 5 day course of Ceftin 500mg BID on 3/16  -Continue home medication Mirabegron     #Abnormal CXR Findings  -CXR showed Patchy left midlung zone opacity. Patient is asymptomatic, no cough/SOB/hypoxia  -Incentive spirometry encouraged   -Repeat CXR with PMD    #Hx of DVT/PE (2023)  -Continue Eliquis 2.5mg BID     #HTN  -Continue Atenolol, Diltiazem, Losartan   -Monitor BP and HR     #HLD  -Continue Lipitor     #GERD  -Continue PPI     #Depression  -Continue zoloft     #Constipation  -Continue miralax and senna   -Dulcolax supp PRN   -Monitor BM's    #History of DVT/PE  #DVT PPx   -On Eliquis     Dispo  -From home, independent prior to fall  -PT/OT recommended home PT, however, patient and family would prefer [facility] - pending choices/auth    Plan of care discussed with patient and patient's daughter Juancarlos at bedside, all questions were answered

## 2025-03-17 NOTE — PROGRESS NOTE ADULT - SUBJECTIVE AND OBJECTIVE BOX
Interval History  Patient seen and examined at bedside. No acute events noted.  Her pain is controlled on current regimen   Patient is having visual hallucinations, seeing people and things in the room that are not there. She is aware that these are hallucinations.   She states that she's had similar experience in the past with pain medication.    ALLERGIES:  No Known Drug Allergies  Seafood (Unknown)    MEDICATIONS  (STANDING):  acetaminophen     Tablet .. 975 milliGRAM(s) Oral every 8 hours  apixaban 2.5 milliGRAM(s) Oral every 12 hours  atenolol  Tablet 100 milliGRAM(s) Oral daily  atorvastatin 20 milliGRAM(s) Oral at bedtime  diltiazem    milliGRAM(s) Oral daily  influenza  Vaccine (HIGH DOSE) 0.5 milliLiter(s) IntraMuscular once  lidocaine   4% Patch 1 Patch Transdermal every 24 hours  losartan 50 milliGRAM(s) Oral daily  mirabegron ER 25 milliGRAM(s) Oral daily  pantoprazole    Tablet 40 milliGRAM(s) Oral before breakfast  polyethylene glycol 3350 17 Gram(s) Oral two times a day  senna 2 Tablet(s) Oral at bedtime  sertraline 150 milliGRAM(s) Oral daily    MEDICATIONS  (PRN):  aluminum hydroxide/magnesium hydroxide/simethicone Suspension 30 milliLiter(s) Oral every 4 hours PRN Dyspepsia  bisacodyl Suppository 10 milliGRAM(s) Rectal daily PRN Constipation  cyclobenzaprine 5 milliGRAM(s) Oral three times a day PRN Muscle Spasm  melatonin 3 milliGRAM(s) Oral at bedtime PRN Insomnia  ondansetron Injectable 4 milliGRAM(s) IV Push every 8 hours PRN Nausea and/or Vomiting  oxyCODONE    IR 5 milliGRAM(s) Oral every 6 hours PRN Severe Pain (7 - 10)  traMADol 50 milliGRAM(s) Oral every 6 hours PRN Moderate Pain (4 - 6)    Vital Signs Last 24 Hrs  T(F): 97.9 (17 Mar 2025 12:15), Max: 98.3 (17 Mar 2025 05:45)  HR: 75 (17 Mar 2025 12:15) (75 - 87)  BP: 146/76 (17 Mar 2025 12:15) (146/67 - 163/83)  RR: 18 (17 Mar 2025 12:15) (18 - 18)  SpO2: 91% (17 Mar 2025 12:15) (91% - 92%)  I&O's Summary    16 Mar 2025 07:01  -  17 Mar 2025 07:00  --------------------------------------------------------  IN: 0 mL / OUT: 800 mL / NET: -800 mL    BMI (kg/m2): 26.9 (03-16-25 @ 12:10)    PHYSICAL EXAM:  GENERAL: NAD  HEENT: Facial ecchymoses - resolving, forehead scab   CHEST/LUNG: Clear to percussion bilaterally; No rales, rhonchi, wheezing  HEART: Regular rate and rhythm  ABDOMEN: Soft, Nontender, Nondistended; Bowel sounds present  MUSCULOSKELETAL/EXTREMITIES:  2+ Peripheral Pulses, No LE edema  Right lower back paraspinal muscle tenderness on palpation, +vertebral tenderness in lower thoracic/upper lumbar region - improved   PSYCH: Appropriate affect  NEURO: Alert & Oriented x 3, CNII-XII intact, fluent speech, 5/5 strength throughout     I personally reviewed the below data/images/labs:    LABS:                        13.3   7.98  )-----------( 310      ( 17 Mar 2025 05:00 )             40.4       03-17    139  |  102  |  16  ----------------------------<  104  4.0   |  26  |  0.45    Ca    9.6      17 Mar 2025 05:00    TPro  7.7  /  Alb  3.5  /  TBili  0.6  /  DBili  x   /  AST  40  /  ALT  36  /  AlkPhos  132  03-17     03-14 Chol 138 mg/dL LDL -- HDL 41 mg/dL Trig 119 mg/d    Urinalysis Basic - ( 17 Mar 2025 05:00 )    Color: x / Appearance: x / SG: x / pH: x  Gluc: 104 mg/dL / Ketone: x  / Bili: x / Urobili: x   Blood: x / Protein: x / Nitrite: x   Leuk Esterase: x / RBC: x / WBC x   Sq Epi: x / Non Sq Epi: x / Bacteria: x    Culture - Urine (collected 13 Mar 2025 01:29)  Source: Clean Catch None  Final Report (16 Mar 2025 08:16):    >100,000 CFU/ml Escherichia coli  Organism: Escherichia coli (16 Mar 2025 08:16)  Organism: Escherichia coli (16 Mar 2025 08:16)      Method Type: MADELYN      -  Amoxicillin/Clavulanic Acid: S <=8/4      -  Ampicillin: S <=8 These ampicillin results predict results for amoxicillin      -  Ampicillin/Sulbactam: S <=4/2      -  Aztreonam: S <=4      -  Cefazolin: S <=2 For uncomplicated UTI with K. pneumoniae, E. coli, or P. mirablis: MADELYN <=16 is sensitive and MADELYN >=32 is resistant. This also predicts results for oral agents cefaclor, cefdinir, cefpodoxime, cefprozil, cefuroxime axetil, cephalexin and locarbef for uncomplicated UTI. Note that some isolates may be susceptible to these agents while testing resistant to cefazolin.      -  Cefepime: S <=2      -  Cefoxitin: S <=8      -  Ceftriaxone: S <=1      -  Cefuroxime: S 8      -  Ciprofloxacin: S <=0.25      -  Ertapenem: S <=0.5      -  Gentamicin: S <=2      -  Imipenem: S <=1      -  Levofloxacin: S <=0.5      -  Meropenem: S <=1      -  Nitrofurantoin: S <=32 Should not be used to treat pyelonephritis      -  Piperacillin/Tazobactam: S <=8      -  Tobramycin: S <=2      -  Trimethoprim/Sulfamethoxazole: S <=0.5/9.5    Consultant(s) Notes Reviewed:   Care Discused with Consultants/Other Providers:  Imaging Personally Reviewed:

## 2025-03-17 NOTE — PROGRESS NOTE ADULT - SUBJECTIVE AND OBJECTIVE BOX
85yF was admitted on 03-13    CC: Patient is a 85y old  Female who presents with a chief complaint of back pain    Subjective/Objective- Patient seen while sitting in the chair. Reports hallucinations, states she was flying and seeing a man laying in the bed. States her daughter was standing in the corner of the room (daughter was not present in the room). Denies other complaints.     Imaging performed:  CT abdomen/pelvis 3/13- Compared to the prior study of October 24, 2024, interval development of mild osteoporotic compression fractures of T12 and L1. Slight retropulsion of bone causes mild narrowing of the right aspect of the spinal canal at L1. No evidence of acute traumatic injury to the abdomen or pelvis.  Chest x ray 3/13-Patchy left midlung zone opacity    REVIEW OF SYSTEMS  No fever  +fatigue  +loss of strength  +back pain  +visual hallucinations    VITALS  T(C): 36.6 (03-17-25 @ 12:15)  T(F): 97.9 (03-17-25 @ 12:15), Max: 98.3 (03-17-25 @ 05:45)  HR: 75 (03-17-25 @ 12:15) (75 - 87)  BP: 146/76 (03-17-25 @ 12:15) (146/67 - 163/83)  RR:  (18 - 18)  SpO2:  (91% - 92%)      PAST MEDICAL & SURGICAL HISTORY  HTN (hypertension)    GERD (gastroesophageal reflux disease)    Mitral valve prolapse    S/P hysterectomy      FUNCTIONAL HISTORY  Lives with daughter in a house 3 steps to enter, 1st floor setup  Independent prior    CURRENT FUNCTIONAL STATUS  3/23 PT  Bed Mobility: Supine to Sit:     · Level of [place]	independent    Bed Mobility Analysis:     · Impairments Contributing to Impaired Bed Mobility	pain    Transfer: Bed to Chair:     Transfer Skill: Bed to Chair   · Level of [place]	independent    Bed/Chair Transfer Safety Analysis:     · Impairments Contributing to Impaired Transfers	pain    Transfer: Sit to Stand:     · Level of [place]	independent    Sit/Stand Transfer Safety Analysis:     · Impairments Contributing to Impaired Transfers	pain    Gait Skills:     · Level of [place]	stand-by assist  · Gait Distance	25 feet    Gait Analysis:     · Impairments Contributing to Gait Deviations	impaired balance; pain    Stair Negotiation:     · Level of [place]	contact guard  · Assistive Device	right rail up; left rail up  · Stair Pattern	step to step    3/17 OT  Chart reviewed, contents noted, attempted to see pt for OT f/u session, received semi-supine in bed in NAD. Per pt and PCA, pt recently returned to bed following prolonged sitting in recliner. Pt requesting to rest in bed at this time, left as found Wishes respected, will reattempt as schedule permits.     SOCIAL HISTORY - as per documentation/history  Smoking - None  EtOH - None  Drugs - None    FAMILY HISTORY   FH: CAD (coronary artery disease)    FH: type 2 diabetes (Sibling)        RECENT LABS - Reviewed  LAB                        13.3   7.98  )-----------( 310      ( 17 Mar 2025 05:00 )             40.4     03-17    139  |  102  |  16  ----------------------------<  104[H]  4.0   |  26  |  0.45[L]    Ca    9.6      17 Mar 2025 05:00    TPro  7.7  /  Alb  3.5  /  TBili  0.6  /  DBili  x   /  AST  40  /  ALT  36  /  AlkPhos  132[H]  03-17    LIVER FUNCTIONS - ( 17 Mar 2025 05:00 )  Alb: 3.5 g/dL / Pro: 7.7 g/dL / ALK PHOS: 132 U/L / ALT: 36 U/L / AST: 40 U/L / GGT: x                 Urinalysis Basic - ( 17 Mar 2025 05:00 )    Color: x / Appearance: x / SG: x / pH: x  Gluc: 104 mg/dL / Ketone: x  / Bili: x / Urobili: x   Blood: x / Protein: x / Nitrite: x   Leuk Esterase: x / RBC: x / WBC x   Sq Epi: x / Non Sq Epi: x / Bacteria: x      ALLERGIES  No Known Drug Allergies  Seafood (Unknown)      MEDICATIONS   MEDICATIONS  (STANDING):  acetaminophen     Tablet .. 975 milliGRAM(s) Oral every 8 hours  apixaban 2.5 milliGRAM(s) Oral every 12 hours  atenolol  Tablet 100 milliGRAM(s) Oral daily  atorvastatin 20 milliGRAM(s) Oral at bedtime  diltiazem    milliGRAM(s) Oral daily  influenza  Vaccine (HIGH DOSE) 0.5 milliLiter(s) IntraMuscular once  lidocaine   4% Patch 1 Patch Transdermal every 24 hours  losartan 50 milliGRAM(s) Oral daily  mirabegron ER 25 milliGRAM(s) Oral daily  pantoprazole    Tablet 40 milliGRAM(s) Oral before breakfast  polyethylene glycol 3350 17 Gram(s) Oral two times a day  senna 2 Tablet(s) Oral at bedtime  sertraline 150 milliGRAM(s) Oral daily    MEDICATIONS  (PRN):  aluminum hydroxide/magnesium hydroxide/simethicone Suspension 30 milliLiter(s) Oral every 4 hours PRN Dyspepsia  bisacodyl Suppository 10 milliGRAM(s) Rectal daily PRN Constipation  cyclobenzaprine 5 milliGRAM(s) Oral three times a day PRN Muscle Spasm  melatonin 3 milliGRAM(s) Oral at bedtime PRN Insomnia  ondansetron Injectable 4 milliGRAM(s) IV Push every 8 hours PRN Nausea and/or Vomiting  oxyCODONE    IR 5 milliGRAM(s) Oral every 6 hours PRN Severe Pain (7 - 10)  traMADol 50 milliGRAM(s) Oral every 6 hours PRN Moderate Pain (4 - 6)    ----------------------------------------------------------------------------------------  PHYSICAL EXAM  Constitutional - Calm  HEENT - Ecchymosis throughout face--improving   Neck - Supple, No limited ROM  Chest - Breathing comfortably, No wheezing  Cardiovascular - S1S2   Abdomen - mildly distended  Extremities -No calf tenderness   Neurologic Exam -                    Cognitive - AAO to self, place, year, situation     Communication - Fluent, No dysarthria     Cranial Nerves - CN 2-12 intact     Motor -                     LEFT    UE - ShAB 5/5, EF 5/5, EE 5/5, WE 5/5,  5/5                    RIGHT UE - ShAB 5/5, EF 5/5, EE 5/5, WE 5/5,  5/5                    LEFT    LE - HF 5/5, KE 5/5, DF 5/5, PF 5/5                    RIGHT LE - HF 5/5, KE 5/5, DF 5/5, PF 5/5     Psychiatric - Calm   ----------------------------------------------------------------------------------------  ASSESSMENT/PLAN  85 year old female admitted s/p fall with back pain  Back pain- CT with mild osteoporotic compression fractures of T12 and L1; TLSO brace when OOB.   Hallucinations Unclear etiology. Patient recently treated for UTI  HTN- Atenolol, Cardizem, Losartan  - Mybetriq  Pain- Flexeril, Oxycodone, Tramadol  Bowel- Dulcolax suppository, Miralax, Senna  Mood- Zoloft   DVT ppx- Eliquis  Pain - Tylenol, Flexeril, Lidoderm patch, Morphine, Oxycodone  Rehab -Patient is independent with mobility and ambulation. Expect discharge home with outpatient followup. Will sign off. Discussed with rehab team.

## 2025-03-18 PROCEDURE — 99233 SBSQ HOSP IP/OBS HIGH 50: CPT

## 2025-03-18 RX ADMIN — Medication 975 MILLIGRAM(S): at 13:05

## 2025-03-18 RX ADMIN — Medication 975 MILLIGRAM(S): at 21:08

## 2025-03-18 RX ADMIN — POLYETHYLENE GLYCOL 3350 17 GRAM(S): 17 POWDER, FOR SOLUTION ORAL at 05:38

## 2025-03-18 RX ADMIN — MIRABEGRON 25 MILLIGRAM(S): 50 TABLET, FILM COATED, EXTENDED RELEASE ORAL at 12:34

## 2025-03-18 RX ADMIN — POLYETHYLENE GLYCOL 3350 17 GRAM(S): 17 POWDER, FOR SOLUTION ORAL at 17:00

## 2025-03-18 RX ADMIN — TRAMADOL HYDROCHLORIDE 50 MILLIGRAM(S): 50 TABLET, FILM COATED ORAL at 16:24

## 2025-03-18 RX ADMIN — LIDOCAINE HYDROCHLORIDE 1 PATCH: 20 JELLY TOPICAL at 21:00

## 2025-03-18 RX ADMIN — LIDOCAINE HYDROCHLORIDE 1 PATCH: 20 JELLY TOPICAL at 19:37

## 2025-03-18 RX ADMIN — OXYCODONE HYDROCHLORIDE 5 MILLIGRAM(S): 30 TABLET ORAL at 20:01

## 2025-03-18 RX ADMIN — Medication 975 MILLIGRAM(S): at 13:35

## 2025-03-18 RX ADMIN — Medication 40 MILLIGRAM(S): at 05:39

## 2025-03-18 RX ADMIN — Medication 2 TABLET(S): at 21:09

## 2025-03-18 RX ADMIN — OXYCODONE HYDROCHLORIDE 5 MILLIGRAM(S): 30 TABLET ORAL at 20:30

## 2025-03-18 RX ADMIN — SERTRALINE 150 MILLIGRAM(S): 100 TABLET, FILM COATED ORAL at 12:34

## 2025-03-18 RX ADMIN — APIXABAN 2.5 MILLIGRAM(S): 2.5 TABLET, FILM COATED ORAL at 17:00

## 2025-03-18 RX ADMIN — LISINOPRIL 100 MILLIGRAM(S): 30 TABLET ORAL at 05:39

## 2025-03-18 RX ADMIN — Medication 975 MILLIGRAM(S): at 21:30

## 2025-03-18 RX ADMIN — LOSARTAN POTASSIUM 50 MILLIGRAM(S): 100 TABLET, FILM COATED ORAL at 05:39

## 2025-03-18 RX ADMIN — Medication 975 MILLIGRAM(S): at 05:39

## 2025-03-18 RX ADMIN — DILTIAZEM HYDROCHLORIDE 180 MILLIGRAM(S): 240 TABLET, EXTENDED RELEASE ORAL at 05:39

## 2025-03-18 RX ADMIN — APIXABAN 2.5 MILLIGRAM(S): 2.5 TABLET, FILM COATED ORAL at 05:39

## 2025-03-18 RX ADMIN — LIDOCAINE HYDROCHLORIDE 1 PATCH: 20 JELLY TOPICAL at 09:34

## 2025-03-18 RX ADMIN — ATORVASTATIN CALCIUM 20 MILLIGRAM(S): 80 TABLET, FILM COATED ORAL at 21:09

## 2025-03-18 NOTE — DIETITIAN INITIAL EVALUATION ADULT - ENERGY INTAKE
Poor (<50%) Patient tolerating diet with poor intake and appetite. Observed consuming 0% of bfast (Pt was sleeping) and ~50% lunch. Patient denies any difficulty chewing/swallowing, but presented with difficulty opening bottles and containers.

## 2025-03-18 NOTE — DIETITIAN INITIAL EVALUATION ADULT - PERTINENT MEDS FT
MEDICATIONS  (STANDING):  acetaminophen     Tablet .. 975 milliGRAM(s) Oral every 8 hours  apixaban 2.5 milliGRAM(s) Oral every 12 hours  atenolol  Tablet 100 milliGRAM(s) Oral daily  atorvastatin 20 milliGRAM(s) Oral at bedtime  diltiazem    milliGRAM(s) Oral daily  influenza  Vaccine (HIGH DOSE) 0.5 milliLiter(s) IntraMuscular once  lidocaine   4% Patch 1 Patch Transdermal every 24 hours  losartan 50 milliGRAM(s) Oral daily  mirabegron ER 25 milliGRAM(s) Oral daily  pantoprazole    Tablet 40 milliGRAM(s) Oral before breakfast  polyethylene glycol 3350 17 Gram(s) Oral two times a day  senna 2 Tablet(s) Oral at bedtime  sertraline 150 milliGRAM(s) Oral daily    MEDICATIONS  (PRN):  aluminum hydroxide/magnesium hydroxide/simethicone Suspension 30 milliLiter(s) Oral every 4 hours PRN Dyspepsia  bisacodyl Suppository 10 milliGRAM(s) Rectal daily PRN Constipation  cyclobenzaprine 5 milliGRAM(s) Oral three times a day PRN Muscle Spasm  melatonin 3 milliGRAM(s) Oral at bedtime PRN Insomnia  ondansetron Injectable 4 milliGRAM(s) IV Push every 8 hours PRN Nausea and/or Vomiting  oxyCODONE    IR 5 milliGRAM(s) Oral every 6 hours PRN Severe Pain (7 - 10)  traMADol 50 milliGRAM(s) Oral every 6 hours PRN Moderate Pain (4 - 6)

## 2025-03-18 NOTE — PROGRESS NOTE ADULT - SUBJECTIVE AND OBJECTIVE BOX
Interval History  Patient seen and examined at bedside. Overnight patient was confused and agitated.   This morning, she is AAO x 4, at her baseline mental status. No further hallucinations   Pain is slightly improved. Had large BM yesterday. No other complaints.     ALLERGIES:  No Known Drug Allergies  Seafood (Unknown)    MEDICATIONS  (STANDING):  acetaminophen     Tablet .. 975 milliGRAM(s) Oral every 8 hours  apixaban 2.5 milliGRAM(s) Oral every 12 hours  atenolol  Tablet 100 milliGRAM(s) Oral daily  atorvastatin 20 milliGRAM(s) Oral at bedtime  diltiazem    milliGRAM(s) Oral daily  influenza  Vaccine (HIGH DOSE) 0.5 milliLiter(s) IntraMuscular once  lidocaine   4% Patch 1 Patch Transdermal every 24 hours  losartan 50 milliGRAM(s) Oral daily  mirabegron ER 25 milliGRAM(s) Oral daily  pantoprazole    Tablet 40 milliGRAM(s) Oral before breakfast  polyethylene glycol 3350 17 Gram(s) Oral two times a day  senna 2 Tablet(s) Oral at bedtime  sertraline 150 milliGRAM(s) Oral daily    MEDICATIONS  (PRN):  aluminum hydroxide/magnesium hydroxide/simethicone Suspension 30 milliLiter(s) Oral every 4 hours PRN Dyspepsia  bisacodyl Suppository 10 milliGRAM(s) Rectal daily PRN Constipation  cyclobenzaprine 5 milliGRAM(s) Oral three times a day PRN Muscle Spasm  melatonin 3 milliGRAM(s) Oral at bedtime PRN Insomnia  ondansetron Injectable 4 milliGRAM(s) IV Push every 8 hours PRN Nausea and/or Vomiting  oxyCODONE    IR 5 milliGRAM(s) Oral every 6 hours PRN Severe Pain (7 - 10)  traMADol 50 milliGRAM(s) Oral every 6 hours PRN Moderate Pain (4 - 6)    Vital Signs Last 24 Hrs  T(F): 98 (18 Mar 2025 12:46), Max: 98.3 (17 Mar 2025 20:00)  HR: 75 (18 Mar 2025 12:46) (75 - 82)  BP: 155/81 (18 Mar 2025 12:46) (149/80 - 155/81)  RR: 17 (18 Mar 2025 12:46) (17 - 19)  SpO2: 92% (18 Mar 2025 12:46) (90% - 92%)  I&O's Summary    17 Mar 2025 07:01  -  18 Mar 2025 07:00  --------------------------------------------------------  IN: 0 mL / OUT: 375 mL / NET: -375 mL    BMI (kg/m2): 26.9 (03-17-25 @ 12:43)    PHYSICAL EXAM:  GENERAL: NAD  HEENT: Facial ecchymoses - resolving, forehead scab   CHEST/LUNG: Clear to percussion bilaterally; No rales, rhonchi, wheezing  HEART: Regular rate and rhythm  ABDOMEN: Soft, Nontender, Nondistended; Bowel sounds present  MUSCULOSKELETAL/EXTREMITIES:  2+ Peripheral Pulses, No LE edema  Right lower back paraspinal muscle tenderness on palpation, +vertebral tenderness in lower thoracic/upper lumbar region - improved   PSYCH: Appropriate affect  NEURO: Alert & Oriented x 3, 5/5 strength throughout     I personally reviewed the below data/images/labs:    LABS:                        13.3   7.98  )-----------( 310      ( 17 Mar 2025 05:00 )             40.4       03-17    139  |  102  |  16  ----------------------------<  104  4.0   |  26  |  0.45    Ca    9.6      17 Mar 2025 05:00    TPro  7.7  /  Alb  3.5  /  TBili  0.6  /  DBili  x   /  AST  40  /  ALT  36  /  AlkPhos  132  03-1    03-14 Chol 138 mg/dL LDL -- HDL 41 mg/dL Trig 119 mg/dL    Urinalysis Basic - ( 17 Mar 2025 05:00 )    Color: x / Appearance: x / SG: x / pH: x  Gluc: 104 mg/dL / Ketone: x  / Bili: x / Urobili: x   Blood: x / Protein: x / Nitrite: x   Leuk Esterase: x / RBC: x / WBC x   Sq Epi: x / Non Sq Epi: x / Bacteria: x    Culture - Urine (collected 13 Mar 2025 01:29)  Source: Clean Catch None  Final Report (16 Mar 2025 08:16):    >100,000 CFU/ml Escherichia coli  Organism: Escherichia coli (16 Mar 2025 08:16)  Organism: Escherichia coli (16 Mar 2025 08:16)      Method Type: MADELYN      -  Amoxicillin/Clavulanic Acid: S <=8/4      -  Ampicillin: S <=8 These ampicillin results predict results for amoxicillin      -  Ampicillin/Sulbactam: S <=4/2      -  Aztreonam: S <=4      -  Cefazolin: S <=2 For uncomplicated UTI with K. pneumoniae, E. coli, or P. mirablis: MADELYN <=16 is sensitive and MADELYN >=32 is resistant. This also predicts results for oral agents cefaclor, cefdinir, cefpodoxime, cefprozil, cefuroxime axetil, cephalexin and locarbef for uncomplicated UTI. Note that some isolates may be susceptible to these agents while testing resistant to cefazolin.      -  Cefepime: S <=2      -  Cefoxitin: S <=8      -  Ceftriaxone: S <=1      -  Cefuroxime: S 8      -  Ciprofloxacin: S <=0.25      -  Ertapenem: S <=0.5      -  Gentamicin: S <=2      -  Imipenem: S <=1      -  Levofloxacin: S <=0.5      -  Meropenem: S <=1      -  Nitrofurantoin: S <=32 Should not be used to treat pyelonephritis      -  Piperacillin/Tazobactam: S <=8      -  Tobramycin: S <=2      -  Trimethoprim/Sulfamethoxazole: S <=0.5/9.5    Consultant(s) Notes Reviewed:   Care Discused with Consultants/Other Providers:  Imaging Personally Reviewed:

## 2025-03-18 NOTE — PROGRESS NOTE ADULT - ASSESSMENT
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 Patient is being admitted for the management of the following:    #Intractable Back and s/p Mechanical Fall 3/4  #Traumatic Subacute T12 and L1 Compression Fractures #Osteopenia/Osteoporosis  #Ambulatory Dysfunction   -CT abd/pelvis showed The bones are diffusely osteopenic. Chronic degenerative changes of the spine. Mild loss of height of the T12 and L1 vertebral bodies indicative of recent compression fracture. There is slight retropulsion of the L1 vertebral body resulting in mild retropulsion into the right aspect of the spinal canal.  -MRI L Spine 3/12 from [facility] ([phone]) obtained showed: Superior and inferior T12 endplate depressions. Broad based superior L1 endplate depression. Approximately 20% loss of central vertebral body height at both levels. Mild bony retropulsion of the superior L1 endplate, impressing upon the thecal sac. AT both of these levels, there is mild indistinctly marginated low T1, bright STIR signal within the compressed marrow suggestive of marrow edema indicating that this may both be of subacute age.   -WBAT  -Fall precaution   -Pain control: Continue tylenol 975mg Q8H, lidocaine patch, flexeril 5mg TID to PRN, tramadol 50mg Q6H PRN for moderate pain, oxycodone 5mg Q6H PRN for severe pain  -Warm compress  -Spoke with Dr. Diaz (Neurosurgeon) 3/13 via transfer center, no surgical intervention warranted at this time, recommended TLSO brace, pain control and PT. If pain is uncontrolled, can consider transfer for kyphoplasty   -OOB w/ TLSO   -PT/OT eval appreciated, recommended home PT  -PMR eval appreciated, recommended home PT  -Outpatient follow up with PMD for treatment for osteoporosis     #Visual Hallucinations 2/2 Toxic Encephalopathy vs. Hospital Delirium   -Suspect due to medications, no focal deficits on exam  -CTH 3/4 was negative for any acute intracranial pathology   -Medications adjustment as above  -Supportive care, frequent reorientation     #Acute UTI (Resolved)  #Overactive Bladder   -UA on admission: large LE, positive nitrite, 12 WBC  -Urine culture grew E coli with sensitivities as above   -s/p 5 day course of Ceftin 500mg BID on 3/16  -Continue home medication Mirabegron     #Abnormal CXR Findings  -CXR showed Patchy left midlung zone opacity. Patient is asymptomatic, no cough/SOB/hypoxia  -Incentive spirometry encouraged   -Repeat CXR with PMD    #Hx of DVT/PE (2023)  -Continue Eliquis 2.5mg BID     #HTN  -Continue Atenolol, Diltiazem, Losartan   -Monitor BP and HR     #HLD  -Continue Lipitor     #GERD  -Continue PPI     #Depression  -Continue zoloft     #Constipation  -Continue miralax and senna   -Dulcolax supp PRN   -Monitor BM's    #Severe protein-calorie malnutrition  -Dietary following     #History of DVT/PE  #DVT PPx   -On Eliquis     Dispo  -From home, independent prior to fall  -PT/OT recommended home PT, however, patient and family would prefer [facility]. As per CM, patient unlikely to obtain auth given her current functional status. This was discussed with patient and daughter at bedside, family agreeable to take patient home but wants to monitor her MS today given her delirium overnight.    Plan of care discussed with patient and patient's daughter Terra at bedside, all questions were answered

## 2025-03-18 NOTE — DIETITIAN INITIAL EVALUATION ADULT - ADD RECOMMEND
1. Recommend Ensure Plus High Protein 8oz PO BID (Provides 700kcal & 40grams of Protein)   2. Monitor daily PO intakes, GI tolerance, labs, weights, skin integrity, & BM regularity

## 2025-03-18 NOTE — PROGRESS NOTE ADULT - TIME BILLING
face to face time encounter and counseling the patient, care coordination, reviewing chart and data-including but not limited to labs and imaging, discussion with rehab team.
Time spent includes direct patient care (interview and examination of patient), discussion with other providers, support staff and/or patient's family members, review of medical records, ordering diagnostic tests and analyzing results, and documentation

## 2025-03-18 NOTE — DIETITIAN INITIAL EVALUATION ADULT - NUTRITIONGOAL OUTCOME1
Patient to meet at least 75% of assessed needs during hospitalization via diet and PO nutrition supplementation.

## 2025-03-18 NOTE — CDI QUERY NOTE - NSCDINOTECODERS_GEN_A_CORE
"[facility] Note     Patient Name: Mima Wilkes  Encounter Date: 1/21/25     Have you been cared for by a [facility] Dermatologist in the last 3 years and, if so, which description applies to you?    Yes.  I have been here within the last 3 years, and my medical history has NOT changed since that time.  I am FEMALE/of child-bearing potential.    REVIEW OF SYSTEMS:  Have you recently had or currently have any of the following? No changes in my recent health.   PAST MEDICAL HISTORY:  Have you personally ever had or currently have any of the following?  If \"YES,\" then please provide more detail. No changes in my medical history.   HISTORY OF IMMUNOSUPPRESSION: Do you have a history of any of the following:  Systemic Immunosuppression such as Diabetes, Biologic or Immunotherapy, Chemotherapy, Organ Transplantation, Bone Marrow Transplantation or Prednisone?  No     Answering \"YES\" requires the addition of the dotphrase \"IMMUNOSUPPRESSED\" as the first diagnosis of the patient's visit.   FAMILY HISTORY:  Any \"first degree relatives\" (parent, brother, sister, or child) with the following?    No changes in my family's known health.   PATIENT EXPERIENCE:    Do you want the Dermatologist to perform a COMPLETE skin exam today including a clinical examination under the \"bra and underwear\" areas?  Yes  If necessary, do we have your permission to call and leave a detailed message on your Preferred Phone number that includes your specific medical information?  Yes      No Known Allergies   Current Outpatient Medications:     ketoconazole (NIZORAL) 2 % cream, Apply topically daily to affected skin on the neck and chest when flaring, and 3 times per week for maintenance when under good control (Patient not taking: Reported on 10/29/2024), Disp: 30 g, Rfl: 10    ketoconazole (NIZORAL) 2 % cream, Apply topically daily to affected skin when flaring, and 3-4 times per week for maintenance when under good control, Disp: " 30 g, Rfl: 10          Whom besides the patient is providing clinical information about today's encounter?   NO ADDITIONAL HISTORIAN (patient alone provided history)    Physical Exam and Assessment/Plan by Diagnosis:  HISTORY OF MELANOMA     Physical Exam:  Anatomic Location Affected:  Left lateral thigh  Morphological Description of Scar:  Well healed scar. No signs of venous insufficiency on left leg or edema  Year Treated: 2024  TNM Classification: pT1a- 0.2 mm  Suspected Recurrence: no  Regional adenopathy: no     Additional History of Present Condition:  Excised by Dr. Gardiner on 4/30/24. The patient denies fevers, chills, weight loss, headache, visual change, bone pain, paraesthesias, cough, SOB, abdominal pain, n/v/d, and is feeling at their baseline health. Patient also states sharp pain and is worried about it. Pain comes and goes, and does not seem driven by exercise or worse at night. Makes it difficult to ambulate. Denies swelling. She also reports a few soaking night sweats.     Assessment and Plan: No evidence of recurrence on skin or scar today but has new onset leg pain and night sweats.  Based on a thorough discussion of this condition and the management approach to it (including a comprehensive discussion of the known risks, side effects and potential benefits of treatment), the patient (family) agrees to implement the following specific plan:  Continue to get 3 month skin checks   CBC, CMP, Ldh (night sweats)  CT with/without contrast of left leg given new onset symptoms though scar appears normal     What happens at follow-up?  The main purpose of follow-up is to detect recurrences early (metastatic melanoma), but it also offers an opportunity to diagnose a new primary melanoma at the first possible opportunity. A second invasive melanoma occurs in 5-10% of melanoma patients and a new melanoma in situ is diagnosed in more than 20% of melanoma patients.     Our practice makes the following  "recommendations for follow-up for patients with invasive melanoma.  At-least \"monthly\" self-skin examinations   Routine skin checks by a board certified dermatologist  Follow-up intervals are \"every 3 months\" within 2 years of a new melanoma diagnosis; \"every 6 months\" between 2-4 years of a new melanoma diagnosis; and \"annually\" after 4 years of a new melanoma diagnosis  Individual patient's needs should be considered before an appropriate follow-up is offered  Provide education and support to help the patient adjust to their illness     Follow-up appointments should include:  A check of the scar where the primary melanoma was removed  Checking the regional lymph nodes  A general skin examination  A full physical examination at least annually by your primary care physician     In those with more advanced primary disease, follow-up may include:  Blood tests  Imaging: ultrasound, X-ray, CT, MRI and PET scan.     Most tests are not worthwhile for patients with stage 1 or 2 melanoma unless there are signs or symptoms of disease recurrence or metastasis. No tests are necessary for healthy patients who have remained well for five years or longer after removal of their melanoma.     What is the outlook for patients with melanoma?  Melanoma in situ is cured by excision because it has no potential to spread around the body.  The risk of spread and ultimate death from invasive melanoma depends on several factors, but the main one is the Breslow thickness of the melanoma at the time it was surgically removed.  Metastases are rare for melanomas < 0.75 mm and the risk for tumours 0.75-1 mm thick is about 5%. The risk steadily increases with thickness so that melanomas > 4 mm have a risk of metastasis of about 40%.     Melanoma is a potentially serious type of skin cancer, in which there is uncontrolled growth of melanocytes (pigment cells). Melanoma is sometimes called malignant melanoma.  Normal melanocytes are found in the " [facility] basal layer of the epidermis (the outer layer of skin). Melanocytes produce a protein called melanin, which protects skin cells by absorbing ultraviolet (UV) radiation. Melanocytes are found in equal numbers in black and white skin, but melanocytes in black skin produce much more melanin. People with dark brown or black skin are very much less likely to be damaged by UV radiation than those with white skin.      SEBORRHEIC KERATOSES  - Relevant exam: Scattered over the trunk/extremities are waxy brown to black plaques and papules with stuck on appearance and consistent dermoscopy  - Exam and clinical history consistent with seborrheic keratoses  - Counseled that these are benign growths that do not require treatment    MELANOCYTIC NEVI  -Relevant exam: Scattered over the trunk/extremities are homogenously pigmented brown macules and papules. ELM performed and without concerning findings. No outliers unless otherwise noted in today's note  - Exam and clinical history consistent with melanocytic nevi  - Counseled to return to clinic prior to scheduled appointment should any of these lesions change or should any new lesions of concern arise  - Counseled on use of sun protection daily. Reviewed latest FDA sunscreen guidelines, including use of broad spectrum (UVA and UVB blocking) sunscreen or sun protective clothing with SPF 30-50 every 2-3 hours and reapplied after exposure to water    LENTIGINES  OTHER SKIN CHANGES DUE TO CHRONIC EXPOSURE TO NONIONIZING RADIATION  - Relevant exam: Over sun exposed areas are brown macules. ELM performed and without concerning findings.  - Exam and clinical history consistent with lentigines.  - Counseled to return to clinic prior to scheduled appointment should any of these lesions change or should any new lesions of concern arise.  - Recommended use of sunscreen as above and below.    CHERRY ANGIOMAS  - Relevant exam: Scattered over the trunk/extremities are red papules  - Exam and  clinical history consistent with cherry angiomas  - Educated that these are benign        Scribe Attestation      I,:  Huyen Coyne MA am acting as a scribe while in the presence of the attending physician.:       I,:  Nya Cm MD personally performed the services described in this documentation    as scribed in my presence.:

## 2025-03-18 NOTE — DIETITIAN INITIAL EVALUATION ADULT - ORAL INTAKE PTA/DIET HISTORY
Patient endorses a good appetite and intake PTA, reporting a lower appetite during current hospitalization. Patient states no recent observable weight change. patient does not follow any dietary pattern at home. Patient denies any micronutrient supplementation. Food allergy to scallops, other seafood is okay, as per patient. UBW: 148lbs x 1 week ago. CBW: 147lbs.
Detail Level: Zone

## 2025-03-18 NOTE — DIETITIAN INITIAL EVALUATION ADULT - OTHER INFO
85F PMHx significant for HTN, HLD,, GERD, DVT (on Eliquis), complaining of low back pain after a fall on March 4th. The patient was walking in the street with her granddaughter when she tripped on a curb and fell forward, hitting her florentino against the ground. Patient was taken to [facility],  where she had a CT head and C-spine maxillofacial which were unremarkable.  She had L-spine x-rays for back pain then which did not show any fracture.  They recommended she take Tylenol for pain but came back to [facility] on March 7th for worsening low back pain and was given tramadol and tizanidine, which helped mildly but still did not control pain. She saw spine surgeon, Dr. You, 3 days ago who switched her to Hydrocodone/Tylenol 5/325.  Her primary doctor told her she could increase hydrocodone to 1 every 6 hours due to still uncontrolled pain. She had an MRI L-spine prior to coming to the ED, results are pending.  Patient describes the pain as a sharp in that starts on the R side of the low back and radiates to the left low back. Denies any numbness/tingling in legs. Rates the pain a 4/10 on the pain scale during encounter. States the pain is worse with movement. Of note, EMS gave patient 100 mcg of fentanyl prior to coming in.

## 2025-03-18 NOTE — DIETITIAN INITIAL EVALUATION ADULT - PERTINENT LABORATORY DATA
03-17    139  |  102  |  16  ----------------------------<  104[H]  4.0   |  26  |  0.45[L]    Ca    9.6      17 Mar 2025 05:00    TPro  7.7  /  Alb  3.5  /  TBili  0.6  /  DBili  x   /  AST  40  /  ALT  36  /  AlkPhos  132[H]  03-17

## 2025-03-18 NOTE — DIETITIAN NUTRITION RISK NOTIFICATION - ADDITIONAL COMMENTS/DIETITIAN RECOMMENDATIONS
1) Recommend Ensure Plus High Protein 8oz PO BID (Provides 700kcal & 40grams of Protein)   2) Monitor daily PO intakes, GI tolerance, labs, weights, skin integrity, & BM regularity

## 2025-03-18 NOTE — CDI QUERY NOTE - NSCDIOTHERTXTBX_GEN_ALL_CORE_HH
Clinical documentation and/or evidence in the medical record indicates that this patient has a compression fracture.  In order to ensure accurate coding and accuracy of the clinical record, the documentation in this patient’s medical record requires additional clarification.      Please include more specific documentation as to the type of compression fracture in your progress note and/or discharge summary.    Please clarify if the patient was found to have one of the following:    •	Compression Fracture due to osteoporosis  •	Non-Traumatic Compression Fracture  •	Pathological Compression Fracture  •	Traumatic Compression Fracture  •	Other (specify)    Supporting documentation and/or clinical evidence:     CT: “Compared to the prior study of October 24, 2024, interval development of mild osteoporotic compression fractures of T12 and L1. Slight etropulsion of bone causes mild narrowing of the right aspect of the spinal canal at L1.  No evidence of acute traumatic injury to the abdomen or pelvis.”    Progress Note:  “#Intractable Back s/p Mechanical Fall 3/4  #Subacute T12 and L1 Compression Fracture  #Suspect Osteopenia/Osteoporosis  -CT abd/pelvis showed The bones are diffusely osteopenic. Chronic degenerative changes of the spine. Mild loss of height of the T12 and L1 vertebral bodies indicative of recent compression fracture. There is slight retropulsion of the L1 vertebral body resulting in mild retropulsion into the right aspect of the spinal canal.”    Physiatry: “85 year old female admitted s/p fall with back pain  Back pain- CT with mild osteoporotic compression fractures of T12 and L1; TLSO brace whe”

## 2025-03-19 ENCOUNTER — TRANSCRIPTION ENCOUNTER (OUTPATIENT)
Age: 85
End: 2025-03-19

## 2025-03-19 VITALS
DIASTOLIC BLOOD PRESSURE: 52 MMHG | SYSTOLIC BLOOD PRESSURE: 142 MMHG | OXYGEN SATURATION: 94 % | HEART RATE: 71 BPM | TEMPERATURE: 98 F

## 2025-03-19 PROCEDURE — 97116 GAIT TRAINING THERAPY: CPT

## 2025-03-19 PROCEDURE — 99239 HOSP IP/OBS DSCHRG MGMT >30: CPT

## 2025-03-19 PROCEDURE — 80053 COMPREHEN METABOLIC PANEL: CPT

## 2025-03-19 PROCEDURE — 87186 SC STD MICRODIL/AGAR DIL: CPT

## 2025-03-19 PROCEDURE — 99285 EMERGENCY DEPT VISIT HI MDM: CPT | Mod: 25

## 2025-03-19 PROCEDURE — 74177 CT ABD & PELVIS W/CONTRAST: CPT | Mod: MC

## 2025-03-19 PROCEDURE — 97535 SELF CARE MNGMENT TRAINING: CPT

## 2025-03-19 PROCEDURE — 71045 X-RAY EXAM CHEST 1 VIEW: CPT

## 2025-03-19 PROCEDURE — 36415 COLL VENOUS BLD VENIPUNCTURE: CPT

## 2025-03-19 PROCEDURE — 83690 ASSAY OF LIPASE: CPT

## 2025-03-19 PROCEDURE — 81001 URINALYSIS AUTO W/SCOPE: CPT

## 2025-03-19 PROCEDURE — 96360 HYDRATION IV INFUSION INIT: CPT

## 2025-03-19 PROCEDURE — 93005 ELECTROCARDIOGRAM TRACING: CPT

## 2025-03-19 PROCEDURE — 85025 COMPLETE CBC W/AUTO DIFF WBC: CPT

## 2025-03-19 PROCEDURE — 97166 OT EVAL MOD COMPLEX 45 MIN: CPT

## 2025-03-19 PROCEDURE — 97161 PT EVAL LOW COMPLEX 20 MIN: CPT

## 2025-03-19 PROCEDURE — 85027 COMPLETE CBC AUTOMATED: CPT

## 2025-03-19 PROCEDURE — 87086 URINE CULTURE/COLONY COUNT: CPT

## 2025-03-19 PROCEDURE — 80061 LIPID PANEL: CPT

## 2025-03-19 RX ORDER — LIDOCAINE HYDROCHLORIDE 20 MG/ML
1 JELLY TOPICAL
Qty: 7 | Refills: 0
Start: 2025-03-19 | End: 2025-03-25

## 2025-03-19 RX ORDER — SENNA 187 MG
2 TABLET ORAL
Qty: 14 | Refills: 0
Start: 2025-03-19 | End: 2025-03-25

## 2025-03-19 RX ORDER — ACETAMINOPHEN 500 MG/5ML
3 LIQUID (ML) ORAL
Qty: 0 | Refills: 0 | DISCHARGE
Start: 2025-03-19

## 2025-03-19 RX ORDER — OXYCODONE HYDROCHLORIDE 30 MG/1
1 TABLET ORAL
Qty: 20 | Refills: 0
Start: 2025-03-19 | End: 2025-03-23

## 2025-03-19 RX ORDER — POLYETHYLENE GLYCOL 3350 17 G/17G
17 POWDER, FOR SOLUTION ORAL
Qty: 238 | Refills: 0
Start: 2025-03-19 | End: 2025-03-25

## 2025-03-19 RX ADMIN — DILTIAZEM HYDROCHLORIDE 180 MILLIGRAM(S): 240 TABLET, EXTENDED RELEASE ORAL at 05:20

## 2025-03-19 RX ADMIN — Medication 40 MILLIGRAM(S): at 05:20

## 2025-03-19 RX ADMIN — LISINOPRIL 100 MILLIGRAM(S): 30 TABLET ORAL at 05:19

## 2025-03-19 RX ADMIN — OXYCODONE HYDROCHLORIDE 5 MILLIGRAM(S): 30 TABLET ORAL at 09:54

## 2025-03-19 RX ADMIN — Medication 975 MILLIGRAM(S): at 05:19

## 2025-03-19 RX ADMIN — LOSARTAN POTASSIUM 50 MILLIGRAM(S): 100 TABLET, FILM COATED ORAL at 05:19

## 2025-03-19 RX ADMIN — SERTRALINE 150 MILLIGRAM(S): 100 TABLET, FILM COATED ORAL at 11:16

## 2025-03-19 RX ADMIN — Medication 975 MILLIGRAM(S): at 05:52

## 2025-03-19 RX ADMIN — APIXABAN 2.5 MILLIGRAM(S): 2.5 TABLET, FILM COATED ORAL at 05:20

## 2025-03-19 RX ADMIN — LIDOCAINE HYDROCHLORIDE 1 PATCH: 20 JELLY TOPICAL at 09:54

## 2025-03-19 RX ADMIN — MIRABEGRON 25 MILLIGRAM(S): 50 TABLET, FILM COATED, EXTENDED RELEASE ORAL at 11:17

## 2025-03-19 RX ADMIN — POLYETHYLENE GLYCOL 3350 17 GRAM(S): 17 POWDER, FOR SOLUTION ORAL at 11:16

## 2025-03-19 NOTE — DISCHARGE NOTE PROVIDER - NSDCMRMEDTOKEN_GEN_ALL_CORE_FT
atenolol 100 mg oral tablet: 1 tab(s) orally once a day  candesartan 16 mg oral tablet: 1 tab(s) orally once a day  Cartia  mg/24 hours oral capsule, extended release: 1 cap(s) orally once a day  Eliquis 2.5 mg oral tablet: 1 tab(s) orally 2 times a day  famotidine 40 mg oral tablet: 1 tab(s) orally once a day  Lipitor 20 mg oral tablet: 1 tab(s) orally once a day (at bedtime)  mirabegron 25 mg oral tablet, extended release: 1 tab(s) orally once a day  sertraline 150 mg oral capsule: 1 cap(s) orally once a day  tiZANidine 2 mg oral capsule: 1 cap(s) orally every 8 hours as needed for  muscle spasm if 1 capsule or tablet is not effective, it is okay to take a second one. MDD: 24mg  TLSO Brace: Use when out of bed    ICD 10: S22.080A, S32.010  traMADol 50 mg oral tablet: 1 tab(s) orally every 8 hours as needed for  severe pain MDD: 3   acetaminophen 325 mg oral tablet: 3 tab(s) orally every 8 hours  atenolol 100 mg oral tablet: 1 tab(s) orally once a day  candesartan 16 mg oral tablet: 1 tab(s) orally once a day  Cartia  mg/24 hours oral capsule, extended release: 1 cap(s) orally once a day  Eliquis 2.5 mg oral tablet: 1 tab(s) orally 2 times a day  famotidine 40 mg oral tablet: 1 tab(s) orally once a day  Lipitor 20 mg oral tablet: 1 tab(s) orally once a day (at bedtime)  mirabegron 25 mg oral tablet, extended release: 1 tab(s) orally once a day  oxyCODONE 5 mg oral tablet: 1 tab(s) orally every 6 hours as needed for Severe Pain (7 - 10) MDD: 4 tabs  polyethylene glycol 3350 oral powder for reconstitution: 17 gram(s) orally 2 times a day  senna leaf extract oral tablet: 2 tab(s) orally once a day (at bedtime)  sertraline 150 mg oral capsule: 1 cap(s) orally once a day  tiZANidine 2 mg oral capsule: 1 cap(s) orally every 8 hours as needed for  muscle spasm if 1 capsule or tablet is not effective, it is okay to take a second one. MDD: 24mg  TLSO Brace: Use when out of bed    ICD 10: S22.080A, S32.010

## 2025-03-19 NOTE — CHART NOTE - NSCHARTNOTEFT_GEN_A_CORE
Consulted orthopedic surgery, Dr. Whyte, who recommends consulting spinal surgery for patient's compression fractures.
The patient has mobility limitation that significantly impairs their ability to participate in one or more mobility related activities of daily living in the home. The patient is able to safely use the walker. The functional mobility deficit can be sufficiently resolved by use of a walker.
Pt seen at bedside to measure and fit with tlso to be used oob for pain and mnotion control from vertebral fx.  Measuremenst taken and brace adjusted to her dimensions.  Daughter instructed to don and doff  Office contact info provided      Sanchez JOHNSON  [phone]

## 2025-03-19 NOTE — DISCHARGE NOTE PROVIDER - NSDCCPCAREPLAN_GEN_ALL_CORE_FT
PRINCIPAL DISCHARGE DIAGNOSIS  Diagnosis: Lumbar compression fracture  Assessment and Plan of Treatment: You were admitted with severe back pain after a fall on 3/4. Your recent MRI showed compression fracture at T12 and L1 spine. Neurosurgery was consulted and did not recommend any surgical intervention at this time. Please continue to take your pain medication as prescribed. Oxycodone is an opioid - please do not drive or operate heavy machinery while taking. Follow up with Dr. You on discharge.      SECONDARY DISCHARGE DIAGNOSES  Diagnosis: Acute UTI  Assessment and Plan of Treatment: You were treated for an urinary tract infection and has completed antibiotic course.

## 2025-03-19 NOTE — DISCHARGE NOTE PROVIDER - CARE PROVIDERS DIRECT ADDRESSES
[email or web address]

## 2025-03-19 NOTE — PROGRESS NOTE ADULT - NUTRITIONAL ASSESSMENT
This patient has been assessed with a concern for Malnutrition and has been determined to have a diagnosis/diagnoses of Severe protein-calorie malnutrition.    This patient is being managed with:   Diet DASH/TLC-  Sodium & Cholesterol Restricted  Entered: Mar 13 2025  4:52AM  
This patient has been assessed with a concern for Malnutrition and has been determined to have a diagnosis/diagnoses of Severe protein-calorie malnutrition.    This patient is being managed with:   Diet DASH/TLC-  Sodium & Cholesterol Restricted  Entered: Mar 13 2025  4:52AM    The following pending diet order is being considered for treatment of Severe protein-calorie malnutrition:  Diet DASH/TLC-  Sodium & Cholesterol Restricted  Supplement Feeding Modality:  Oral  Ensure Plus High Protein Cans or Servings Per Day:  1       Frequency:  Two Times a day  Entered: Mar 19 2025  6:57AM

## 2025-03-19 NOTE — DISCHARGE NOTE PROVIDER - NSDCFUSCHEDAPPT_GEN_ALL_CORE_FT
Mervin Reynolds  [facility]  NEUROLOGY [address]  Scheduled Appointment: 03/20/2025    Jody Mejia  [facility]  FAMILYMED [address]  Scheduled Appointment: 03/25/2025    [facility]  DIAGRAD [address]  Scheduled Appointment: 04/23/2025    [facility]  BRSTIMAG [address]  Scheduled Appointment: 04/23/2025    [facility]  ULTRASND [address]  Scheduled Appointment: 04/23/2025    Rupesh Khan  [facility]  CARDIOLOGY [address]  Scheduled Appointment: 06/10/2025

## 2025-03-19 NOTE — DISCHARGE NOTE NURSING/CASE MANAGEMENT/SOCIAL WORK - FINANCIAL ASSISTANCE
[facility] provides services at a reduced cost to those who are determined to be eligible through [facility]’s financial assistance program. Information regarding [facility]’s financial assistance program can be found by going to [email or web address] or by calling [phone].

## 2025-03-19 NOTE — PROGRESS NOTE ADULT - SUBJECTIVE AND OBJECTIVE BOX
Interval History  Patient seen and examined at bedside. No acute events noted.  Patient slept well, pain is improved with oxycodone. No further visual hallucinations. Mental status is at baseline. Wants go to home.    ALLERGIES:  No Known Drug Allergies  Seafood (Unknown)    MEDICATIONS  (STANDING):  acetaminophen     Tablet .. 975 milliGRAM(s) Oral every 8 hours  apixaban 2.5 milliGRAM(s) Oral every 12 hours  atenolol  Tablet 100 milliGRAM(s) Oral daily  atorvastatin 20 milliGRAM(s) Oral at bedtime  diltiazem    milliGRAM(s) Oral daily  influenza  Vaccine (HIGH DOSE) 0.5 milliLiter(s) IntraMuscular once  lidocaine   4% Patch 1 Patch Transdermal every 24 hours  losartan 50 milliGRAM(s) Oral daily  mirabegron ER 25 milliGRAM(s) Oral daily  pantoprazole    Tablet 40 milliGRAM(s) Oral before breakfast  polyethylene glycol 3350 17 Gram(s) Oral two times a day  senna 2 Tablet(s) Oral at bedtime  sertraline 150 milliGRAM(s) Oral daily    MEDICATIONS  (PRN):  aluminum hydroxide/magnesium hydroxide/simethicone Suspension 30 milliLiter(s) Oral every 4 hours PRN Dyspepsia  bisacodyl Suppository 10 milliGRAM(s) Rectal daily PRN Constipation  cyclobenzaprine 5 milliGRAM(s) Oral three times a day PRN Muscle Spasm  melatonin 3 milliGRAM(s) Oral at bedtime PRN Insomnia  ondansetron Injectable 4 milliGRAM(s) IV Push every 8 hours PRN Nausea and/or Vomiting  oxyCODONE    IR 5 milliGRAM(s) Oral every 6 hours PRN Severe Pain (7 - 10)  traMADol 50 milliGRAM(s) Oral every 6 hours PRN Moderate Pain (4 - 6)    Vital Signs Last 24 Hrs  T(F): 98 (19 Mar 2025 05:07), Max: 98 (18 Mar 2025 12:46)  HR: 72 (19 Mar 2025 05:07) (72 - 92)  BP: 136/70 (19 Mar 2025 05:07) (136/70 - 155/81)  RR: 17 (18 Mar 2025 20:00) (17 - 17)  SpO2: 88% (19 Mar 2025 05:07) (88% - 92%)  I&O's Summary    BMI (kg/m2): 26.9 (03-19-25 @ 11:02)    PHYSICAL EXAM:  GENERAL: NAD  HEENT: Facial ecchymoses - resolving, forehead scab   CHEST/LUNG: Clear to percussion bilaterally; No rales, rhonchi, wheezing  HEART: Regular rate and rhythm  ABDOMEN: Soft, Nontender, Nondistended; Bowel sounds present  MUSCULOSKELETAL/EXTREMITIES:  2+ Peripheral Pulses, No LE edema  Right lower back paraspinal muscle tenderness on palpation, +vertebral tenderness in lower thoracic/upper lumbar region - continues to improved   PSYCH: Appropriate affect  NEURO: Alert & Oriented x 3, 5/5 strength throughout     I personally reviewed the below data/images/labs:    LABS:                        13.3   7.98  )-----------( 310      ( 17 Mar 2025 05:00 )             40.4       03-17    139  |  102  |  16  ----------------------------<  104  4.0   |  26  |  0.45    Ca    9.6      17 Mar 2025 05:00    TPro  7.7  /  Alb  3.5  /  TBili  0.6  /  DBili  x   /  AST  40  /  ALT  36  /  AlkPhos  132  03-17    03-14 Chol 138 mg/dL LDL -- HDL 41 mg/dL Trig 119 mg/dL    Urinalysis Basic - ( 17 Mar 2025 05:00 )    Color: x / Appearance: x / SG: x / pH: x  Gluc: 104 mg/dL / Ketone: x  / Bili: x / Urobili: x   Blood: x / Protein: x / Nitrite: x   Leuk Esterase: x / RBC: x / WBC x   Sq Epi: x / Non Sq Epi: x / Bacteria: x    Culture - Urine (collected 13 Mar 2025 01:29)  Source: Clean Catch None  Final Report (16 Mar 2025 08:16):    >100,000 CFU/ml Escherichia coli  Organism: Escherichia coli (16 Mar 2025 08:16)  Organism: Escherichia coli (16 Mar 2025 08:16)      Method Type: MADELYN      -  Amoxicillin/Clavulanic Acid: S <=8/4      -  Ampicillin: S <=8 These ampicillin results predict results for amoxicillin      -  Ampicillin/Sulbactam: S <=4/2      -  Aztreonam: S <=4      -  Cefazolin: S <=2 For uncomplicated UTI with K. pneumoniae, E. coli, or P. mirablis: MADELYN <=16 is sensitive and MADELYN >=32 is resistant. This also predicts results for oral agents cefaclor, cefdinir, cefpodoxime, cefprozil, cefuroxime axetil, cephalexin and locarbef for uncomplicated UTI. Note that some isolates may be susceptible to these agents while testing resistant to cefazolin.      -  Cefepime: S <=2      -  Cefoxitin: S <=8      -  Ceftriaxone: S <=1      -  Cefuroxime: S 8      -  Ciprofloxacin: S <=0.25      -  Ertapenem: S <=0.5      -  Gentamicin: S <=2      -  Imipenem: S <=1      -  Levofloxacin: S <=0.5      -  Meropenem: S <=1      -  Nitrofurantoin: S <=32 Should not be used to treat pyelonephritis      -  Piperacillin/Tazobactam: S <=8      -  Tobramycin: S <=2      -  Trimethoprim/Sulfamethoxazole: S <=0.5/9.5    Consultant(s) Notes Reviewed:   Care Discused with Consultants/Other Providers:  Imaging Personally Reviewed:

## 2025-03-19 NOTE — DISCHARGE NOTE NURSING/CASE MANAGEMENT/SOCIAL WORK - PATIENT PORTAL LINK FT
You can access the FollowMyHealth Patient Portal offered by [facility] by registering at the following website: [email or web address]. By joining [facility]’s FollowMyHealth portal, you will also be able to view your health information using other applications (apps) compatible with our system.

## 2025-03-19 NOTE — DISCHARGE NOTE PROVIDER - PROVIDER TOKENS
PROVIDER:[TOKEN:[[id number]],FOLLOWUP:[1 week]],PROVIDER:[TOKEN:[[id number]],FOLLOWUP:[1 week]]

## 2025-03-19 NOTE — DISCHARGE NOTE PROVIDER - DETAILS OF MALNUTRITION DIAGNOSIS/DIAGNOSES
This patient has been assessed with a concern for Malnutrition and was treated during this hospitalization for the following Nutrition diagnosis/diagnoses:     -  03/18/2025: Severe protein-calorie malnutrition

## 2025-03-19 NOTE — DISCHARGE NOTE PROVIDER - HOSPITAL COURSE
Anemia due to acute blood loss 85 year old female with past medical significant for HTN, HLD,, GERD, DVT (on Eliquis), complaining of low back pain after a fall on March 4th. The patient was walking in the street with her granddaughter when she tripped on a curb and fell forward, hitting her florentino against the ground. Patient was taken to [facility],  where she had a CT head and C-spine maxillofacial which were unremarkable.  She had L-spine x-rays for back pain then which did not show any fracture.  They recommended she take Tylenol for pain but came back to [facility] on March 7th for worsening low back pain and was given tramadol and tizanidine, which helped mildly but still did not control pain. She saw spine surgeon, Dr. You, 3 days ago who switched her to Hydrocodone/Tylenol 5/325.  Her primary doctor told her she could increase hydrocodone to 1 every 6 hours due to still uncontrolled pain. She had an MRI L-spine prior to coming to the ED, results are pending.  Patient describes the pain as a sharp in that starts on the R side of the low back and radiates to the left low back. Denies any numbness/tingling in legs. Rates the pain a 4/10 on the pain scale during encounter. States the pain is worse with movement. Of note, EMS gave patient 100 mcg of fentanyl prior to coming in.   85 year old female with past medical significant for HTN, HLD, GERD, DVT (on Eliquis), complaining of low back pain after a fall on March 4th. The patient was walking in the street with her granddaughter when she tripped on a curb and fell forward, hitting her florentino against the ground. Patient was taken to [facility],  where she had a CT head and C-spine maxillofacial which were unremarkable.  She had L-spine x-rays for back pain then which did not show any fracture.  They recommended she take Tylenol for pain but came back to [facility] on March 7th for worsening low back pain and was given tramadol and tizanidine, which helped mildly but still did not control pain. She saw spine surgeon, Dr. You, 3 days prior to presentation was ordered for MRI L-spine which was done on 3/12. She represented on 3/13 for intractable back pain and inability to ambulate.  CT abd/pelvis on admission showed  The bones are diffusely osteopenic. Chronic degenerative changes of the spine. Mild loss of height of the T12 and L1 vertebral bodies indicative of recent compression fracture. There is slight retropulsion of the L1 vertebral body resulting in mild retropulsion into the right aspect of the spinal canal.    Hospital Course  Patient was admitted for intractable back pain and ambulatory dysfunction following fall on 3/4 with traumatic T12/L2 compression fractures  MRI L Spine 3/12 from [facility] ([id number]) obtained showed: Superior and inferior T12 endplate depressions. Broad based superior L1 endplate depression. Approximately 20% loss of central vertebral body height at both levels. Mild bony retropulsion of the superior L1 endplate, impressing upon the thecal sac. AT both of these levels, there is mild indistinctly marginated low T1, bright STIR signal within the compressed marrow suggestive of marrow edema indicating that this may both be of subacute age.   Spoke with Dr. Diaz (Neurosurgeon) 3/13 via transfer center, no surgical intervention warranted at this time, recommended TLSO brace, pain control and PT. If pain is uncontrolled, can consider transfer for kyphoplasty.  She was evaluated and was provided with TLSO. Her pain medications were adjusted.  Her hospital course was complicated by visual hallucinations due to toxic encephalopathy (medication induced) vs. hospital delirium.   Flexeril was discontinued and mental status has been at baseline. Her pain is controlled on tylenol and PRN oxycodone.   She was evaluated by PT/OT/PRM, home PT was recommended.    She is stable for discharge with outpatient follow up with her PMD and ortho spine.   Patient's PMD Dr. Hathaway was updated

## 2025-03-19 NOTE — PROGRESS NOTE ADULT - ASSESSMENT
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 Patient is being admitted for the management of the following:    #Intractable Back and s/p Mechanical Fall 3/4  #Traumatic Subacute T12 and L1 Compression Fractures #Osteopenia/Osteoporosis  #Ambulatory Dysfunction   -CT abd/pelvis showed The bones are diffusely osteopenic. Chronic degenerative changes of the spine. Mild loss of height of the T12 and L1 vertebral bodies indicative of recent compression fracture. There is slight retropulsion of the L1 vertebral body resulting in mild retropulsion into the right aspect of the spinal canal.  -MRI L Spine 3/12 from [facility] ([id number]) obtained showed: Superior and inferior T12 endplate depressions. Broad based superior L1 endplate depression. Approximately 20% loss of central vertebral body height at both levels. Mild bony retropulsion of the superior L1 endplate, impressing upon the thecal sac. AT both of these levels, there is mild indistinctly marginated low T1, bright STIR signal within the compressed marrow suggestive of marrow edema indicating that this may both be of subacute age.   -WBAT  -Fall precaution   -Pain control: Continue tylenol 975mg Q8H, lidocaine patch, oxycodone 5mg Q6H PRN for severe pain. Did not tolerate flexeril due to visual hallucinations   -Warm compress  -Spoke with Dr. Diaz (Neurosurgeon) 3/13 via transfer center, no surgical intervention warranted at this time, recommended TLSO brace, pain control and PT. If pain is uncontrolled, can consider transfer for kyphoplasty   -OOB w/ TLSO   -PT/OT eval appreciated, recommended home PT  -PMR eval appreciated, recommended home PT  -Outpatient follow up with PMD for treatment for osteoporosis   -Outpatient follow up with Dr. You    #Visual Hallucinations 2/2 Toxic Encephalopathy vs. Hospital Delirium - Resolved  -Suspect due to medications, no focal deficits on exam  -CTH 3/4 was negative for any acute intracranial pathology   -Medications adjustment as above  -Supportive care, frequent reorientation     #Acute UTI (Resolved)  #Overactive Bladder   -UA on admission: large LE, positive nitrite, 12 WBC  -Urine culture grew E coli with sensitivities as above   -s/p 5 day course of Ceftin 500mg BID on 3/16  -Continue home medication Mirabegron     #Abnormal CXR Findings  -CXR showed Patchy left midlung zone opacity. Patient is asymptomatic, no cough/SOB/hypoxia  -Incentive spirometry encouraged   -Repeat CXR with PMD    #Hx of DVT/PE (2023)  -Continue Eliquis 2.5mg BID     #HTN  -Continue Atenolol, Diltiazem, Losartan   -Monitor BP and HR     #HLD  -Continue Lipitor     #GERD  -Continue PPI     #Depression  -Continue zoloft     #Constipation  -Continue miralax and senna   -Dulcolax supp PRN   -Monitor BM's    #Severe protein-calorie malnutrition  -Dietary following     #History of DVT/PE  #DVT PPx   -On Eliquis     Dispo  -From home, independent prior to fall  -PT/OT recommended home PT, however, patient and family would prefer [facility]. As per CM, patient unlikely to obtain auth given her current functional status. 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 Patient is being admitted for the management of the following:    #Intractable Back and s/p Mechanical Fall 3/4  #Traumatic Subacute T12 and L1 Compression Fractures #Osteopenia/Osteoporosis  #Ambulatory Dysfunction   -CT abd/pelvis showed The bones are diffusely osteopenic. Chronic degenerative changes of the spine. Mild loss of height of the T12 and L1 vertebral bodies indicative of recent compression fracture. There is slight retropulsion of the L1 vertebral body resulting in mild retropulsion into the right aspect of the spinal canal.  -MRI L Spine 3/12 from [facility] ([id number]) obtained showed: Superior and inferior T12 endplate depressions. Broad based superior L1 endplate depression. Approximately 20% loss of central vertebral body height at both levels. Mild bony retropulsion of the superior L1 endplate, impressing upon the thecal sac. AT both of these levels, there is mild indistinctly marginated low T1, bright STIR signal within the compressed marrow suggestive of marrow edema indicating that this may both be of subacute age.   -WBAT  -Fall precaution   -Pain control: Continue tylenol 975mg Q8H, lidocaine patch, oxycodone 5mg Q6H PRN for severe pain. Did not tolerate flexeril due to visual hallucinations   -Warm compress  -Spoke with Dr. Diaz (Neurosurgeon) 3/13 via transfer center, no surgical intervention warranted at this time, recommended TLSO brace, pain control and PT. If pain is uncontrolled, can consider transfer for kyphoplasty   -OOB w/ TLSO   -PT/OT eval appreciated, recommended home PT  -PMR eval appreciated, recommended home PT  -Outpatient follow up with PMD for treatment for osteoporosis   -Outpatient follow up with Dr. You    #Visual Hallucinations 2/2 Toxic Encephalopathy vs. Hospital Delirium - Resolved  -Suspect due to medications, no focal deficits on exam  -CTH 3/4 was negative for any acute intracranial pathology   -Medications adjustment as above  -Supportive care, frequent reorientation     #Acute UTI (Resolved)  #Overactive Bladder   -UA on admission: large LE, positive nitrite, 12 WBC  -Urine culture grew E coli with sensitivities as above   -s/p 5 day course of Ceftin 500mg BID on 3/16  -Continue home medication Mirabegron     #Abnormal CXR Findings  -CXR showed Patchy left midlung zone opacity. Patient is asymptomatic, no cough/SOB/hypoxia  -Incentive spirometry encouraged   -Repeat CXR with PMD - family is aware     #Hx of DVT/PE (2023)  -Continue Eliquis 2.5mg BID     #HTN  -Continue Atenolol, Diltiazem, Losartan   -Monitor BP and HR     #HLD  -Continue Lipitor     #GERD  -Continue PPI     #Depression  -Continue zoloft     #Constipation  -Continue miralax and senna   -Dulcolax supp PRN   -Monitor BM's    #Severe protein-calorie malnutrition  -Dietary following     #History of DVT/PE  #DVT PPx   -On Eliquis     Dispo  -From home, independent prior to fall  -PT/OT recommended home PT, however, patient and family would prefer [facility]. As per CM, patient unlikely to obtain auth given her current functional status. This was discussed with patient and daughter at bedside, family agreeable to take patient home with home PT    Plan of care discussed with patient and patient's daughter at bedside, all questions were answered

## 2025-03-19 NOTE — DISCHARGE NOTE PROVIDER - CARE PROVIDER_API CALL
Leone, Vincent Jean-Claude  Spine Surgery  [address], [address]  [place] [address]  Phone: [phone]  Fax: [phone]  Follow Up Time: 1 week    Shama Hathaway  [address], [address]  [place] [address]  Phone: [phone]  Fax: [phone]  Follow Up Time: 1 week

## 2025-03-19 NOTE — DISCHARGE NOTE NURSING/CASE MANAGEMENT/SOCIAL WORK - NSDCPEFALRISK_GEN_ALL_CORE
For information on Fall & Injury Prevention, visit: [email or web address] OR  [email or web address] OR  https://www.cdc.gov/steadi/patient.html

## 2025-03-20 ENCOUNTER — APPOINTMENT (OUTPATIENT)
Dept: NEUROLOGY | Facility: CLINIC | Age: 85
End: 2025-03-20

## 2025-03-25 ENCOUNTER — APPOINTMENT (OUTPATIENT)
Dept: FAMILY MEDICINE | Facility: CLINIC | Age: 85
End: 2025-03-25
Payer: MEDICARE

## 2025-03-25 VITALS
TEMPERATURE: 98.1 F | DIASTOLIC BLOOD PRESSURE: 82 MMHG | SYSTOLIC BLOOD PRESSURE: 131 MMHG | OXYGEN SATURATION: 96 % | RESPIRATION RATE: 16 BRPM | HEART RATE: 77 BPM

## 2025-03-25 DIAGNOSIS — R41.0 DISORIENTATION, UNSPECIFIED: ICD-10-CM

## 2025-03-25 DIAGNOSIS — Z09 ENCOUNTER FOR FOLLOW-UP EXAMINATION AFTER COMPLETED TREATMENT FOR CONDITIONS OTHER THAN MALIGNANT NEOPLASM: ICD-10-CM

## 2025-03-25 DIAGNOSIS — F41.9 ANXIETY DISORDER, UNSPECIFIED: ICD-10-CM

## 2025-03-25 DIAGNOSIS — F32.A ANXIETY DISORDER, UNSPECIFIED: ICD-10-CM

## 2025-03-25 PROCEDURE — 99496 TRANSJ CARE MGMT HIGH F2F 7D: CPT

## 2025-03-25 RX ORDER — OXYCODONE 5 MG/1
5 TABLET ORAL EVERY 6 HOURS
Qty: 28 | Refills: 0 | Status: ACTIVE | OUTPATIENT
Start: 2025-03-25

## 2025-03-26 ENCOUNTER — APPOINTMENT (OUTPATIENT)
Dept: ORTHOPEDIC SURGERY | Facility: CLINIC | Age: 85
End: 2025-03-26
Payer: MEDICARE

## 2025-03-26 DIAGNOSIS — M48.50XA COLLAPSED VERTEBRA, NOT ELSEWHERE CLASSIFIED, SITE UNSPECIFIED, INITIAL ENCOUNTER FOR FRACTURE: ICD-10-CM

## 2025-03-26 LAB
ALBUMIN SERPL ELPH-MCNC: 4.5 G/DL
ALP BLD-CCNC: 177 U/L
ALT SERPL-CCNC: 21 U/L
ANION GAP SERPL CALC-SCNC: 11 MMOL/L
AST SERPL-CCNC: 29 U/L
BILIRUB SERPL-MCNC: 0.3 MG/DL
BUN SERPL-MCNC: 16 MG/DL
CALCIUM SERPL-MCNC: 9.6 MG/DL
CHLORIDE SERPL-SCNC: 104 MMOL/L
CO2 SERPL-SCNC: 26 MMOL/L
CREAT SERPL-MCNC: 0.56 MG/DL
EGFRCR SERPLBLD CKD-EPI 2021: 90 ML/MIN/1.73M2
GLUCOSE SERPL-MCNC: 119 MG/DL
POTASSIUM SERPL-SCNC: 4.3 MMOL/L
PROT SERPL-MCNC: 7 G/DL
SODIUM SERPL-SCNC: 141 MMOL/L

## 2025-03-26 PROCEDURE — 99214 OFFICE O/P EST MOD 30 MIN: CPT

## 2025-03-26 RX ORDER — TIZANIDINE 2 MG/1
2 TABLET ORAL
Qty: 30 | Refills: 1 | Status: ACTIVE | COMMUNITY
Start: 2025-03-26 | End: 1900-01-01

## 2025-04-01 DIAGNOSIS — R74.8 ABNORMAL LEVELS OF OTHER SERUM ENZYMES: ICD-10-CM

## 2025-04-10 LAB
ALBUMIN SERPL ELPH-MCNC: 4.6 G/DL
ALP BLD-CCNC: 131 U/L
ALT SERPL-CCNC: 47 U/L
AST SERPL-CCNC: 43 U/L
BILIRUB DIRECT SERPL-MCNC: 0.1 MG/DL
BILIRUB INDIRECT SERPL-MCNC: 0.2 MG/DL
BILIRUB SERPL-MCNC: 0.3 MG/DL
GGT SERPL-CCNC: 50 U/L
PROT SERPL-MCNC: 7.2 G/DL

## 2025-04-14 ENCOUNTER — RX RENEWAL (OUTPATIENT)
Age: 85
End: 2025-04-14

## 2025-04-16 ENCOUNTER — RX RENEWAL (OUTPATIENT)
Age: 85
End: 2025-04-16

## 2025-04-17 ENCOUNTER — NON-APPOINTMENT (OUTPATIENT)
Age: 85
End: 2025-04-17

## 2025-04-17 DIAGNOSIS — R74.8 ABNORMAL LEVELS OF OTHER SERUM ENZYMES: ICD-10-CM

## 2025-04-17 DIAGNOSIS — E78.5 HYPERLIPIDEMIA, UNSPECIFIED: ICD-10-CM

## 2025-05-05 ENCOUNTER — APPOINTMENT (OUTPATIENT)
Dept: MAMMOGRAPHY | Facility: CLINIC | Age: 85
End: 2025-05-05

## 2025-05-05 ENCOUNTER — APPOINTMENT (OUTPATIENT)
Dept: RADIOLOGY | Facility: CLINIC | Age: 85
End: 2025-05-05

## 2025-05-05 ENCOUNTER — APPOINTMENT (OUTPATIENT)
Dept: ULTRASOUND IMAGING | Facility: CLINIC | Age: 85
End: 2025-05-05

## 2025-05-20 NOTE — GOALS OF CARE CONVERSATION - ADVANCED CARE PLANNING - PARTICIPANTS
Patient Quality 226: Preventive Care And Screening: Tobacco Use: Screening And Cessation Intervention: Patient screened for tobacco use and is an ex/non-smoker Quality 431: Preventive Care And Screening: Unhealthy Alcohol Use - Screening: Patient not identified as an unhealthy alcohol user when screened for unhealthy alcohol use using a systematic screening method Quality 130: Documentation Of Current Medications In The Medical Record: Current Medications Documented Detail Level: Detailed

## 2025-05-27 ENCOUNTER — OUTPATIENT (OUTPATIENT)
Dept: OUTPATIENT SERVICES | Facility: HOSPITAL | Age: 85
LOS: 1 days | End: 2025-05-27
Payer: MEDICARE

## 2025-05-27 ENCOUNTER — APPOINTMENT (OUTPATIENT)
Dept: CT IMAGING | Facility: HOSPITAL | Age: 85
End: 2025-05-27
Payer: MEDICARE

## 2025-05-27 DIAGNOSIS — Z90.710 ACQUIRED ABSENCE OF BOTH CERVIX AND UTERUS: Chronic | ICD-10-CM

## 2025-05-27 DIAGNOSIS — Z00.8 ENCOUNTER FOR OTHER GENERAL EXAMINATION: ICD-10-CM

## 2025-05-27 PROCEDURE — 71250 CT THORAX DX C-: CPT | Mod: 26

## 2025-05-27 PROCEDURE — 71250 CT THORAX DX C-: CPT

## 2025-06-10 ENCOUNTER — APPOINTMENT (OUTPATIENT)
Dept: CARDIOLOGY | Facility: CLINIC | Age: 85
End: 2025-06-10
Payer: MEDICARE

## 2025-06-10 VITALS
DIASTOLIC BLOOD PRESSURE: 71 MMHG | HEIGHT: 61 IN | BODY MASS INDEX: 26.43 KG/M2 | HEART RATE: 64 BPM | WEIGHT: 140 LBS | SYSTOLIC BLOOD PRESSURE: 126 MMHG

## 2025-06-10 PROBLEM — R07.89 OTHER CHEST PAIN: Status: ACTIVE | Noted: 2025-06-10

## 2025-06-10 PROCEDURE — 93000 ELECTROCARDIOGRAM COMPLETE: CPT

## 2025-06-10 PROCEDURE — 99214 OFFICE O/P EST MOD 30 MIN: CPT | Mod: 25

## 2025-06-13 ENCOUNTER — APPOINTMENT (OUTPATIENT)
Dept: RADIOLOGY | Facility: HOSPITAL | Age: 85
End: 2025-06-13

## 2025-06-17 ENCOUNTER — APPOINTMENT (OUTPATIENT)
Dept: CT IMAGING | Facility: CLINIC | Age: 85
End: 2025-06-17
Payer: MEDICARE

## 2025-06-17 ENCOUNTER — OUTPATIENT (OUTPATIENT)
Dept: OUTPATIENT SERVICES | Facility: HOSPITAL | Age: 85
LOS: 1 days | End: 2025-06-17
Payer: MEDICARE

## 2025-06-17 DIAGNOSIS — Z90.710 ACQUIRED ABSENCE OF BOTH CERVIX AND UTERUS: Chronic | ICD-10-CM

## 2025-06-17 DIAGNOSIS — R07.89 OTHER CHEST PAIN: ICD-10-CM

## 2025-06-17 PROCEDURE — 75574 CT ANGIO HRT W/3D IMAGE: CPT

## 2025-06-17 PROCEDURE — 75574 CT ANGIO HRT W/3D IMAGE: CPT | Mod: 26

## 2025-06-26 ENCOUNTER — RESULT REVIEW (OUTPATIENT)
Age: 85
End: 2025-06-26

## 2025-06-26 ENCOUNTER — OUTPATIENT (OUTPATIENT)
Dept: OUTPATIENT SERVICES | Facility: HOSPITAL | Age: 85
LOS: 1 days | End: 2025-06-26
Payer: MEDICARE

## 2025-06-26 DIAGNOSIS — Z90.710 ACQUIRED ABSENCE OF BOTH CERVIX AND UTERUS: Chronic | ICD-10-CM

## 2025-06-26 DIAGNOSIS — Z00.8 ENCOUNTER FOR OTHER GENERAL EXAMINATION: ICD-10-CM

## 2025-06-26 PROCEDURE — 75580 N-INVAS EST C FFR SW ALY CTA: CPT | Mod: 26

## 2025-06-26 PROCEDURE — 75580 N-INVAS EST C FFR SW ALY CTA: CPT

## 2025-07-23 DIAGNOSIS — Z12.39 ENCOUNTER FOR OTHER SCREENING FOR MALIGNANT NEOPLASM OF BREAST: ICD-10-CM

## 2025-07-24 ENCOUNTER — APPOINTMENT (OUTPATIENT)
Dept: MAMMOGRAPHY | Facility: HOSPITAL | Age: 85
End: 2025-07-24
Payer: MEDICARE

## 2025-07-24 ENCOUNTER — RESULT REVIEW (OUTPATIENT)
Age: 85
End: 2025-07-24

## 2025-07-24 ENCOUNTER — OUTPATIENT (OUTPATIENT)
Dept: OUTPATIENT SERVICES | Facility: HOSPITAL | Age: 85
LOS: 1 days | End: 2025-07-24
Payer: MEDICARE

## 2025-07-24 ENCOUNTER — APPOINTMENT (OUTPATIENT)
Dept: ULTRASOUND IMAGING | Facility: HOSPITAL | Age: 85
End: 2025-07-24
Payer: MEDICARE

## 2025-07-24 DIAGNOSIS — Z12.39 ENCOUNTER FOR OTHER SCREENING FOR MALIGNANT NEOPLASM OF BREAST: ICD-10-CM

## 2025-07-24 DIAGNOSIS — Z90.710 ACQUIRED ABSENCE OF BOTH CERVIX AND UTERUS: Chronic | ICD-10-CM

## 2025-07-24 PROCEDURE — 77067 SCR MAMMO BI INCL CAD: CPT | Mod: 26

## 2025-07-24 PROCEDURE — 77067 SCR MAMMO BI INCL CAD: CPT

## 2025-07-24 PROCEDURE — 77063 BREAST TOMOSYNTHESIS BI: CPT

## 2025-07-24 PROCEDURE — 76641 ULTRASOUND BREAST COMPLETE: CPT | Mod: 26,50

## 2025-07-24 PROCEDURE — 77063 BREAST TOMOSYNTHESIS BI: CPT | Mod: 26

## 2025-07-24 PROCEDURE — 76641 ULTRASOUND BREAST COMPLETE: CPT

## 2025-07-30 DIAGNOSIS — R92.8 OTHER ABNORMAL AND INCONCLUSIVE FINDINGS ON DIAGNOSTIC IMAGING OF BREAST: ICD-10-CM

## 2025-07-31 ENCOUNTER — APPOINTMENT (OUTPATIENT)
Dept: ULTRASOUND IMAGING | Facility: CLINIC | Age: 85
End: 2025-07-31
Payer: MEDICARE

## 2025-07-31 ENCOUNTER — RESULT REVIEW (OUTPATIENT)
Age: 85
End: 2025-07-31

## 2025-07-31 ENCOUNTER — OUTPATIENT (OUTPATIENT)
Dept: OUTPATIENT SERVICES | Facility: HOSPITAL | Age: 85
LOS: 1 days | End: 2025-07-31
Payer: MEDICARE

## 2025-07-31 DIAGNOSIS — R92.8 OTHER ABNORMAL AND INCONCLUSIVE FINDINGS ON DIAGNOSTIC IMAGING OF BREAST: ICD-10-CM

## 2025-07-31 PROCEDURE — 88360 TUMOR IMMUNOHISTOCHEM/MANUAL: CPT | Mod: 26

## 2025-07-31 PROCEDURE — 77065 DX MAMMO INCL CAD UNI: CPT

## 2025-07-31 PROCEDURE — 88342 IMHCHEM/IMCYTCHM 1ST ANTB: CPT | Mod: 26,59

## 2025-07-31 PROCEDURE — 19083 BX BREAST 1ST LESION US IMAG: CPT

## 2025-07-31 PROCEDURE — 19083 BX BREAST 1ST LESION US IMAG: CPT | Mod: LT

## 2025-07-31 PROCEDURE — 88305 TISSUE EXAM BY PATHOLOGIST: CPT | Mod: 26

## 2025-07-31 PROCEDURE — A4648: CPT

## 2025-07-31 PROCEDURE — 77065 DX MAMMO INCL CAD UNI: CPT | Mod: 26,LT

## 2025-08-05 ENCOUNTER — TRANSCRIPTION ENCOUNTER (OUTPATIENT)
Age: 85
End: 2025-08-05

## 2025-08-06 ENCOUNTER — NON-APPOINTMENT (OUTPATIENT)
Age: 85
End: 2025-08-06

## 2025-08-07 ENCOUNTER — NON-APPOINTMENT (OUTPATIENT)
Age: 85
End: 2025-08-07

## 2025-08-11 PROBLEM — C50.912 INFILTRATING DUCTAL CARCINOMA OF LEFT BREAST: Status: ACTIVE | Noted: 2025-08-05

## 2025-08-12 ENCOUNTER — APPOINTMENT (OUTPATIENT)
Dept: SURGICAL ONCOLOGY | Facility: CLINIC | Age: 85
End: 2025-08-12
Payer: MEDICARE

## 2025-08-12 ENCOUNTER — NON-APPOINTMENT (OUTPATIENT)
Age: 85
End: 2025-08-12

## 2025-08-12 VITALS
SYSTOLIC BLOOD PRESSURE: 130 MMHG | BODY MASS INDEX: 24.66 KG/M2 | HEIGHT: 62 IN | DIASTOLIC BLOOD PRESSURE: 75 MMHG | WEIGHT: 134 LBS | HEART RATE: 69 BPM | RESPIRATION RATE: 16 BRPM | OXYGEN SATURATION: 97 %

## 2025-08-12 DIAGNOSIS — C50.912 MALIGNANT NEOPLASM OF UNSPECIFIED SITE OF LEFT FEMALE BREAST: ICD-10-CM

## 2025-08-12 DIAGNOSIS — Z78.9 OTHER SPECIFIED HEALTH STATUS: ICD-10-CM

## 2025-08-12 PROCEDURE — 99205 OFFICE O/P NEW HI 60 MIN: CPT

## 2025-08-12 PROCEDURE — G2212 PROLONG OUTPT/OFFICE VIS: CPT

## 2025-08-15 DIAGNOSIS — R41.0 DISORIENTATION, UNSPECIFIED: ICD-10-CM

## 2025-08-15 DIAGNOSIS — Z86.59 PERSONAL HISTORY OF OTHER MENTAL AND BEHAVIORAL DISORDERS: ICD-10-CM

## 2025-08-15 DIAGNOSIS — S01.81XA LACERATION W/OUT FOREIGN BODY OF OTHER PART OF HEAD, INITIAL ENCOUNTER: ICD-10-CM

## 2025-08-15 DIAGNOSIS — Z09 ENCOUNTER FOR FOLLOW-UP EXAMINATION AFTER COMPLETED TREATMENT FOR CONDITIONS OTHER THAN MALIGNANT NEOPLASM: ICD-10-CM

## 2025-08-15 RX ORDER — DIAZEPAM 5 MG/1
5 TABLET ORAL
Qty: 3 | Refills: 0 | Status: ACTIVE | COMMUNITY
Start: 2025-08-15 | End: 1900-01-01

## 2025-08-21 ENCOUNTER — OUTPATIENT (OUTPATIENT)
Dept: OUTPATIENT SERVICES | Facility: HOSPITAL | Age: 85
LOS: 1 days | End: 2025-08-21
Payer: MEDICARE

## 2025-08-21 ENCOUNTER — APPOINTMENT (OUTPATIENT)
Dept: MRI IMAGING | Facility: IMAGING CENTER | Age: 85
End: 2025-08-21
Payer: MEDICARE

## 2025-08-21 DIAGNOSIS — C50.912 MALIGNANT NEOPLASM OF UNSPECIFIED SITE OF LEFT FEMALE BREAST: ICD-10-CM

## 2025-08-21 DIAGNOSIS — Z90.710 ACQUIRED ABSENCE OF BOTH CERVIX AND UTERUS: Chronic | ICD-10-CM

## 2025-08-21 PROCEDURE — C8908: CPT

## 2025-08-21 PROCEDURE — A9585: CPT

## 2025-08-21 PROCEDURE — C8937: CPT

## 2025-08-21 PROCEDURE — 77049 MRI BREAST C-+ W/CAD BI: CPT | Mod: 26

## 2025-08-23 ENCOUNTER — NON-APPOINTMENT (OUTPATIENT)
Age: 85
End: 2025-08-23

## 2025-08-26 DIAGNOSIS — R92.8 OTHER ABNORMAL AND INCONCLUSIVE FINDINGS ON DIAGNOSTIC IMAGING OF BREAST: ICD-10-CM

## 2025-08-29 ENCOUNTER — APPOINTMENT (OUTPATIENT)
Dept: MRI IMAGING | Facility: IMAGING CENTER | Age: 85
End: 2025-08-29
Payer: MEDICARE

## 2025-08-29 ENCOUNTER — RESULT REVIEW (OUTPATIENT)
Age: 85
End: 2025-08-29

## 2025-08-29 ENCOUNTER — OUTPATIENT (OUTPATIENT)
Dept: OUTPATIENT SERVICES | Facility: HOSPITAL | Age: 85
LOS: 1 days | End: 2025-08-29
Payer: MEDICARE

## 2025-08-29 DIAGNOSIS — Z90.710 ACQUIRED ABSENCE OF BOTH CERVIX AND UTERUS: Chronic | ICD-10-CM

## 2025-08-29 DIAGNOSIS — R92.8 OTHER ABNORMAL AND INCONCLUSIVE FINDINGS ON DIAGNOSTIC IMAGING OF BREAST: ICD-10-CM

## 2025-08-29 PROCEDURE — 88342 IMHCHEM/IMCYTCHM 1ST ANTB: CPT | Mod: 26

## 2025-08-29 PROCEDURE — 77065 DX MAMMO INCL CAD UNI: CPT

## 2025-08-29 PROCEDURE — A9585: CPT

## 2025-08-29 PROCEDURE — 77065 DX MAMMO INCL CAD UNI: CPT | Mod: 26,LT

## 2025-08-29 PROCEDURE — 19085 BX BREAST 1ST LESION MR IMAG: CPT | Mod: LT

## 2025-08-29 PROCEDURE — A4648: CPT

## 2025-08-29 PROCEDURE — 19085 BX BREAST 1ST LESION MR IMAG: CPT

## 2025-08-29 PROCEDURE — 88341 IMHCHEM/IMCYTCHM EA ADD ANTB: CPT | Mod: 26

## 2025-08-29 PROCEDURE — 88305 TISSUE EXAM BY PATHOLOGIST: CPT | Mod: 26

## 2025-09-05 DIAGNOSIS — N60.92 UNSPECIFIED BENIGN MAMMARY DYSPLASIA OF LEFT BREAST: ICD-10-CM

## 2025-09-08 ENCOUNTER — NON-APPOINTMENT (OUTPATIENT)
Age: 85
End: 2025-09-08

## 2025-09-10 ENCOUNTER — APPOINTMENT (OUTPATIENT)
Dept: CARDIOLOGY | Facility: CLINIC | Age: 85
End: 2025-09-10
Payer: MEDICARE

## 2025-09-10 VITALS
BODY MASS INDEX: 25.95 KG/M2 | DIASTOLIC BLOOD PRESSURE: 67 MMHG | SYSTOLIC BLOOD PRESSURE: 114 MMHG | WEIGHT: 141 LBS | HEIGHT: 62 IN | OXYGEN SATURATION: 97 % | HEART RATE: 71 BPM

## 2025-09-10 PROCEDURE — 99214 OFFICE O/P EST MOD 30 MIN: CPT | Mod: 25

## 2025-09-10 PROCEDURE — 93000 ELECTROCARDIOGRAM COMPLETE: CPT | Mod: NC

## 2025-09-17 ENCOUNTER — RX RENEWAL (OUTPATIENT)
Age: 85
End: 2025-09-17

## 2025-09-18 ENCOUNTER — APPOINTMENT (OUTPATIENT)
Dept: MAMMOGRAPHY | Facility: CLINIC | Age: 85
End: 2025-09-18
Payer: MEDICARE

## 2025-09-18 ENCOUNTER — APPOINTMENT (OUTPATIENT)
Dept: ULTRASOUND IMAGING | Facility: CLINIC | Age: 85
End: 2025-09-18
Payer: MEDICARE

## 2025-09-18 ENCOUNTER — RESULT REVIEW (OUTPATIENT)
Age: 85
End: 2025-09-18

## 2025-09-18 PROBLEM — J43.9 EMPHYSEMA, UNSPECIFIED: Chronic | Status: ACTIVE | Noted: 2025-09-16

## 2025-09-18 PROBLEM — H35.30 UNSPECIFIED MACULAR DEGENERATION: Chronic | Status: ACTIVE | Noted: 2025-09-16

## 2025-09-18 PROBLEM — I82.409 ACUTE EMBOLISM AND THROMBOSIS OF UNSPECIFIED DEEP VEINS OF UNSPECIFIED LOWER EXTREMITY: Chronic | Status: ACTIVE | Noted: 2025-09-16

## 2025-09-18 PROBLEM — I26.99 OTHER PULMONARY EMBOLISM WITHOUT ACUTE COR PULMONALE: Chronic | Status: ACTIVE | Noted: 2025-09-16

## 2025-09-18 PROCEDURE — 19281 PERQ DEVICE BREAST 1ST IMAG: CPT | Mod: LT,59

## 2025-09-18 PROCEDURE — 19285 PERQ DEV BREAST 1ST US IMAG: CPT | Mod: LT
